# Patient Record
Sex: FEMALE | Race: WHITE | NOT HISPANIC OR LATINO | Employment: FULL TIME | URBAN - METROPOLITAN AREA
[De-identification: names, ages, dates, MRNs, and addresses within clinical notes are randomized per-mention and may not be internally consistent; named-entity substitution may affect disease eponyms.]

---

## 2017-02-24 ENCOUNTER — OFFICE VISIT (OUTPATIENT)
Dept: FAMILY MEDICINE | Facility: CLINIC | Age: 42
End: 2017-02-24
Payer: COMMERCIAL

## 2017-02-24 ENCOUNTER — RADIANT APPOINTMENT (OUTPATIENT)
Dept: GENERAL RADIOLOGY | Facility: CLINIC | Age: 42
End: 2017-02-24
Attending: FAMILY MEDICINE
Payer: COMMERCIAL

## 2017-02-24 VITALS
WEIGHT: 174.5 LBS | HEIGHT: 68 IN | HEART RATE: 70 BPM | DIASTOLIC BLOOD PRESSURE: 82 MMHG | BODY MASS INDEX: 26.45 KG/M2 | TEMPERATURE: 97.5 F | SYSTOLIC BLOOD PRESSURE: 116 MMHG | OXYGEN SATURATION: 97 %

## 2017-02-24 DIAGNOSIS — S69.92XA INJURY OF LEFT THUMB, INITIAL ENCOUNTER: ICD-10-CM

## 2017-02-24 DIAGNOSIS — S63.602A LEFT THUMB SPRAIN, INITIAL ENCOUNTER: Primary | ICD-10-CM

## 2017-02-24 PROCEDURE — 99214 OFFICE O/P EST MOD 30 MIN: CPT | Performed by: FAMILY MEDICINE

## 2017-02-24 PROCEDURE — 73140 X-RAY EXAM OF FINGER(S): CPT | Mod: LT

## 2017-02-24 ASSESSMENT — ENCOUNTER SYMPTOMS
CARDIOVASCULAR NEGATIVE: 1
RESPIRATORY NEGATIVE: 1
CONSTITUTIONAL NEGATIVE: 1

## 2017-02-24 NOTE — PROGRESS NOTES
"Musculoskeletal Problem       Musculoskeletal problem/pain      Duration: 2017 went skiing ; fell onto closed left hand, jamming thumb into snow    Had immediate swelling and pain of her entire left thumb, bruising has since tracked down to base of thumb    Hasn't been able to flex left thumb since then, so has difficult getting dressed    Right handed, so able to function at work    Description  Location: left thumb lower part    Intensity:  mild    Accompanying signs and symptoms: numbness, tingling and swelling    History  Previous similar problem: no   Previous evaluation:  None, pt hoping to get a X-ray on the thumb    Precipitating or alleviating factors:  Trauma or overuse: YES  Aggravating factors include: using the thumb or everyday use.    Therapies tried and outcome: stretching      No Known Allergies   Past Surgical History   Procedure Laterality Date     Hc tooth extraction w/forcep        section       x2        Review of Systems   Constitutional: Negative.    Respiratory: Negative.    Cardiovascular: Negative.    ROS:    Gen: Feeling well.   Endocrine: no heat or cold intolerance, no hair or skin changes  ID: no fevers, chills  Skin: no rashes  Psych: Mood is good  Chest: No dyspnea or chest pain on exertion.    Abd: No abdominal pain  GI: no change in bowel habits, black or bloody stools.    : No urinary tract symptoms. Specifically denies dysuria, hematuria, urgency, frequency, hesitancy, incomplete voiding.  Pelvic: denies pain, lymphadenopathy , unusual discharge  MS: no LEweakness, gait problems      /82 (BP Location: Left arm, Patient Position: Chair, Cuff Size: Adult Large)  Pulse 70  Temp 97.5  F (36.4  C) (Oral)  Ht 5' 8.2\" (1.732 m)  Wt 174 lb 8 oz (79.2 kg)  SpO2 97%  BMI 26.38 kg/m2     Physical Exam   Constitutional: She is well-developed, well-nourished, and in no distress.   Pulmonary/Chest: No respiratory distress.   Musculoskeletal:        Left hand: She exhibits " decreased range of motion, tenderness and swelling. She exhibits no bony tenderness, normal two-point discrimination, normal capillary refill, no deformity and no laceration. Normal sensation noted.        Hands:  Left thumb swollen, mild diffuse tenderness without localized severe point tenderness. Unable to flex thumb completely. Normal extension.   Neurological: She is alert.   Skin: Skin is warm and dry.   Nursing note and vitals reviewed.    I personally reviewed xray, no acute fracture noted.  ASSESSMENT / PLAN:  (S69.92XA) Injury of left thumb, initial encounter  (primary encounter diagnosis)  Comment: thumb sprain, left wrist splint fit today, Please see patient instructions below.     Plan: XR Finger Left G/E 2 Views, COTY PT, HAND, AND         CHIROPRACTIC REFERRAL         Alternate ice and heat, splint until swelling lessens  Then schedule physical therapy   The patient indicates understanding of these issues and agrees with the plan.

## 2017-02-24 NOTE — NURSING NOTE
"Chief Complaint   Patient presents with     Musculoskeletal Problem     left thumb injury       Initial /82 (BP Location: Left arm, Patient Position: Chair, Cuff Size: Adult Large)  Pulse 70  Temp 97.5  F (36.4  C) (Oral)  Ht 5' 8.2\" (1.732 m)  Wt 174 lb 8 oz (79.2 kg)  SpO2 97%  BMI 26.38 kg/m2 Estimated body mass index is 26.38 kg/(m^2) as calculated from the following:    Height as of this encounter: 5' 8.2\" (1.732 m).    Weight as of this encounter: 174 lb 8 oz (79.2 kg).  Medication Reconciliation: complete Odessa Fong MA      "

## 2017-02-24 NOTE — TELEPHONE ENCOUNTER
Did you want patient to get an OTC wrist brace?  Or send the prescription to a Mdical Supply Store?  I loaded [facility] and pended the DME as a refill.  Or you could have patient see MA to get a wrist brace from the clinic here.  Please advise  Iveth Batista RN

## 2017-02-24 NOTE — PROGRESS NOTES
SUBJECTIVE:                                                    Abigail Harvey is a 41 year old female who presents to clinic today for the following health issues:

## 2017-02-24 NOTE — TELEPHONE ENCOUNTER
Hi, I gave it to her in clinic, so it doesn't need to be filled.  Sincerely,  Dr. Rocio Treviño MD  2/24/2017

## 2017-02-24 NOTE — TELEPHONE ENCOUNTER
Reason for Call:  Other prescription    Detailed comments: Pharmacy states they are unable to fill or bill [employer] (WRIST/THUMB SPLINT/LEFT UNIV) MISC. They would suggest this be sent to a medical supply company or the patient picking it up directly from the clinic. Please follow up on other options. Thanks!    Call taken on 2/24/2017 at 2:46 PM by Carmen Hu

## 2017-02-24 NOTE — MR AVS SNAPSHOT
After Visit Summary   2/24/2017    Abigail Harvey    MRN: [id number]           Patient Information     Date Of Birth          1975        Visit Information        Provider Department      2/24/2017 11:00 AM Rocio Treviño MD [facility]        Today's Diagnoses     Injury of left thumb, initial encounter    -  1      Care Instructions    Phase 1: ice, compression (aircast or wrapping), elevation, and antiinflammatories (ibuprofen, naprosyn, ketoprofen, mobic).      Starting 48 hours after injury, contrast baths done three times per day if possible help reduce swelling.    Contrast Bath  1. Soak area in cold water bath (cold water in a bucket with some ice cubes) for 30 seconds.  2. Switch to bucket of as hot as water as can be tolerated without burning (around 104 degrees/40 degrees celcius, the same as a hot hot tub) for 30 sec.  Continue to switch back and forth every 30 seconds for 5 minutes, finishing in the cold water bath.    Phase 2:  When you can use handt without increasing pain or causing more swelling (generally in 2-4 weeks), continue to wear brace, but start strengthening and stretching exercises.  You may start Physical Therapy at this phase.    [facility]  Physical therapy to get you back in the game of life   [facility], a service of [facility], provides orthopedic and sports physical therapy at over 30 [facility] locations. In addtion to physical therapy, [facility] offers chiropractic and athletic training services.   Appointments [phone] - with or without a physician referral               Follow-ups after your visit        Who to contact     If you have questions or need follow up information about today's clinic visit or your schedule please contact [facility] directly at [phone].  Normal or non-critical lab and imaging results will be communicated to you by Carolee  "letter or phone within 4 business days after the clinic has received the results. If you do not hear from us within 7 days, please contact the clinic through Celeno or phone. If you have a critical or abnormal lab result, we will notify you by phone as soon as possible.  Submit refill requests through Celeno or call your pharmacy and they will forward the refill request to us. Please allow 3 business days for your refill to be completed.          Additional Information About Your Visit        [employer] Information     Celeno gives you secure access to your electronic health record. If you see a primary care provider, you can also send messages to your care team and make appointments. If you have questions, please call your primary care clinic.  If you do not have a primary care provider, please call [phone] and they will assist you.        Care EveryWhere ID     This is your Care EveryWhere ID. This could be used by other organizations to access your [facility] medical records  [id number]        Your Vitals Were     Pulse Temperature Height Pulse Oximetry BMI (Body Mass Index)       70 97.5  F (36.4  C) (Oral) 5' 8.2\" (1.732 m) 97% 26.38 kg/m2        Blood Pressure from Last 3 Encounters:   02/24/17 116/82   12/21/16 116/82   06/06/16 124/85    Weight from Last 3 Encounters:   02/24/17 174 lb 8 oz (79.2 kg)   12/21/16 171 lb (77.6 kg)   06/06/16 172 lb (78 kg)               Primary Care Provider Office Phone # Fax #    Deqa Isaura Packer [phone]       [facility] [address]  [facility] [address]        Thank you!     Thank you for choosing [facility]  for your care. Our goal is always to provide you with excellent care. Hearing back from our patients is one way we can continue to improve our services. Please take a few minutes to complete the written survey that you may receive in the mail after your visit with us. Thank you!             Your Updated " Medication List - Protect others around you: Learn how to safely use, store and throw away your medicines at www.disposemymeds.org.          This list is accurate as of: 2/24/17 12:00 PM.  Always use your most recent med list.                   Brand Name Dispense Instructions for use    CLARITIN 10 MG tablet   Generic drug:  loratadine     30 tablet    Take 1 tablet (10 mg) by mouth daily       levonorgestrel-ethinyl estradiol 0.15-0.03 MG per tablet    JOLESSA    91 tablet    Take 1 tablet by mouth daily       olopatadine 0.1 % ophthalmic solution    PATANOL    5 mL    Place 1 drop into both eyes 2 times daily

## 2017-02-24 NOTE — PATIENT INSTRUCTIONS
Phase 1: ice, compression (aircast or wrapping), elevation, and antiinflammatories (ibuprofen, naprosyn, ketoprofen, mobic).      Starting 48 hours after injury, contrast baths done three times per day if possible help reduce swelling.    Contrast Bath  1. Soak area in cold water bath (cold water in a bucket with some ice cubes) for 30 seconds.  2. Switch to bucket of as hot as water as can be tolerated without burning (around 104 degrees/40 degrees celcius, the same as a hot hot tub) for 30 sec.  Continue to switch back and forth every 30 seconds for 5 minutes, finishing in the cold water bath.    Phase 2:  When you can use handt without increasing pain or causing more swelling (generally in 2-4 weeks), continue to wear brace, but start strengthening and stretching exercises.  You may start Physical Therapy at this phase.    [facility]  Physical therapy to get you back in the game of life   The [facility], a service of [facility], provides orthopedic and sports physical therapy at over 30 [facility] locations. In addtion to physical therapy, [facility] offers chiropractic and athletic training services.   Appointments [phone] - with or without a physician referral

## 2017-03-22 ENCOUNTER — RADIANT APPOINTMENT (OUTPATIENT)
Dept: GENERAL RADIOLOGY | Facility: CLINIC | Age: 42
End: 2017-03-22
Attending: FAMILY MEDICINE
Payer: COMMERCIAL

## 2017-03-22 ENCOUNTER — OFFICE VISIT (OUTPATIENT)
Dept: FAMILY MEDICINE | Facility: CLINIC | Age: 42
End: 2017-03-22
Payer: COMMERCIAL

## 2017-03-22 VITALS
WEIGHT: 171 LBS | TEMPERATURE: 98.8 F | RESPIRATION RATE: 16 BRPM | HEART RATE: 78 BPM | OXYGEN SATURATION: 97 % | SYSTOLIC BLOOD PRESSURE: 126 MMHG | DIASTOLIC BLOOD PRESSURE: 74 MMHG | BODY MASS INDEX: 25.85 KG/M2

## 2017-03-22 DIAGNOSIS — M79.645 PAIN OF LEFT THUMB: Primary | ICD-10-CM

## 2017-03-22 DIAGNOSIS — M79.645 PAIN OF LEFT THUMB: ICD-10-CM

## 2017-03-22 PROCEDURE — 73130 X-RAY EXAM OF HAND: CPT | Mod: LT

## 2017-03-22 PROCEDURE — 99213 OFFICE O/P EST LOW 20 MIN: CPT | Performed by: FAMILY MEDICINE

## 2017-03-22 NOTE — MR AVS SNAPSHOT
After Visit Summary   3/22/2017    Abigail Harvey    MRN: [id number]           Patient Information     Date Of Birth          1975        Visit Information        Provider Department      3/22/2017 1:20 PM Mis Packer MD [facility]        Today's Diagnoses     Pain of left thumb    -  1       Follow-ups after your visit        Your next 10 appointments already scheduled     Mar 23, 2017  5:00 PM CDT   COTY Hand with Krys Torrez [facility] ([facility])    [facility][note 3][address]  [address] [address] [phone]              Who to contact     If you have questions or need follow up information about today's clinic visit or your schedule please contact [facility] directly at [phone].  Normal or non-critical lab and imaging results will be communicated to you by MyChart, letter or phone within 4 business days after the clinic has received the results. If you do not hear from us within 7 days, please contact the clinic through MyChart or phone. If you have a critical or abnormal lab result, we will notify you by phone as soon as possible.  Submit refill requests through Kitchon or call your pharmacy and they will forward the refill request to us. Please allow 3 business days for your refill to be completed.          Additional Information About Your Visit        MyChart Information     Kitchon gives you secure access to your electronic health record. If you see a primary care provider, you can also send messages to your care team and make appointments. If you have questions, please call your primary care clinic.  If you do not have a primary care provider, please call [phone] and they will assist you.        Care EveryWhere ID     This is your Care EveryWhere ID. This could be used by other organizations to access your [place] medical records  [id number]        Your Vitals Were      Pulse Temperature Respirations Pulse Oximetry BMI (Body Mass Index)       78 98.8  F (37.1  C) (Tympanic) 16 97% 25.85 kg/m2        Blood Pressure from Last 3 Encounters:   03/22/17 126/74   02/24/17 116/82   12/21/16 116/82    Weight from Last 3 Encounters:   03/22/17 171 lb (77.6 kg)   02/24/17 174 lb 8 oz (79.2 kg)   12/21/16 171 lb (77.6 kg)              Today, you had the following     No orders found for display       Primary Care Provider Office Phone # Fax #    Deqa Isaura Packer [phone]       [place] [address] [facility] [address]        Thank you!     Thank you for choosing [facility]  for your care. Our goal is always to provide you with excellent care. Hearing back from our patients is one way we can continue to improve our services. Please take a few minutes to complete the written survey that you may receive in the mail after your visit with us. Thank you!             Your Updated Medication List - Protect others around you: Learn how to safely use, store and throw away your medicines at www.disposemymeds.org.          This list is accurate as of: 3/22/17  3:36 PM.  Always use your most recent med list.                   Brand Name Dispense Instructions for use    CLARITIN 10 MG tablet   Generic drug:  loratadine     30 tablet    Take 1 tablet (10 mg) by mouth daily       levonorgestrel-ethinyl estradiol 0.15-0.03 MG per tablet    JOLESSA    91 tablet    Take 1 tablet by mouth daily       olopatadine 0.1 % ophthalmic solution    PATANOL    5 mL    Place 1 drop into both eyes 2 times daily       Wrist/Thumb Splint/Left Univ Misc     1 each    1 Device continuous

## 2017-03-22 NOTE — PROGRESS NOTES
SUBJECTIVE:                                                    Abigail Harvey is a 41 year old female who presents to clinic today for the following health issues:      Pt wants repeat xray. She had previous thumb injury after falling on snow. She had xray done last month which did not show fracture. She is currently wearing thumb splint and will start hand therapy. She is concerned because she still has edema and numbness/tinglng in the morning which improves after wearing the splint. ROM is also limited.     Problem list and histories reviewed & adjusted, as indicated.  Additional history: as documented      Reviewed and updated as needed this visit by clinical staff       Reviewed and updated as needed this visit by Provider         ROS:  Constitutional, HEENT, cardiovascular, pulmonary, gi and gu systems are negative, except as otherwise noted.    OBJECTIVE:                                                    /74 (BP Location: Left arm, Patient Position: Chair, Cuff Size: Adult Regular)  Pulse 78  Temp 98.8  F (37.1  C) (Tympanic)  Resp 16  Wt 171 lb (77.6 kg)  SpO2 97%  BMI 25.85 kg/m2  Body mass index is 25.85 kg/(m^2).  GENERAL: healthy, alert and no distress  EYES: Eyes grossly normal to inspection  HENT: nose and mouth without ulcers or lesions  MS: + left thumb with edema, + tenderness, limited ROM     Diagnostic Test Results:  none      ASSESSMENT/PLAN:                                                      ## Pain of left thumb  - repeat xray done in clinic, per my view showed no acute fracture   - recommended to use ice every two to three times daily to help with edema  - start hand therapy, continue to work on ROM and use splint as needed   - Follow if symptoms worsen or fail to improve.    Mis Packer MD  [facility]

## 2017-03-22 NOTE — NURSING NOTE
"Chief Complaint   Patient presents with     Injury     possible thumb facture       Initial /74 (BP Location: Left arm, Patient Position: Chair, Cuff Size: Adult Regular)  Pulse 78  Temp 98.8  F (37.1  C) (Tympanic)  Resp 16  Wt 171 lb (77.6 kg)  SpO2 97%  BMI 25.85 kg/m2 Estimated body mass index is 25.85 kg/(m^2) as calculated from the following:    Height as of 2/24/17: 5' 8.2\" (1.732 m).    Weight as of this encounter: 171 lb (77.6 kg).  Medication Reconciliation: complete       Abdirizak Fong MA      "

## 2017-03-23 ENCOUNTER — THERAPY VISIT (OUTPATIENT)
Dept: OCCUPATIONAL THERAPY | Facility: CLINIC | Age: 42
End: 2017-03-23
Payer: COMMERCIAL

## 2017-03-23 DIAGNOSIS — M79.645 THUMB PAIN, LEFT: Primary | ICD-10-CM

## 2017-03-23 PROCEDURE — 29130 APPL FINGER SPLINT STATIC: CPT | Mod: GO | Performed by: OCCUPATIONAL THERAPIST

## 2017-03-23 PROCEDURE — 97165 OT EVAL LOW COMPLEX 30 MIN: CPT | Mod: GO | Performed by: OCCUPATIONAL THERAPIST

## 2017-03-23 PROCEDURE — 97110 THERAPEUTIC EXERCISES: CPT | Mod: GO | Performed by: OCCUPATIONAL THERAPIST

## 2017-03-23 NOTE — MR AVS SNAPSHOT
After Visit Summary   3/23/2017    Abigail Harvey    MRN: [id number]           Patient Information     Date Of Birth          1975        Visit Information        Provider Department      3/23/2017 5:00 PM Krys Torrez OT [facility] [place]        Today's Diagnoses     Thumb pain, left    -  1       Follow-ups after your visit        Your next 10 appointments already scheduled     Mar 30, 2017  5:30 PM CDT   COTY Hand with Krys Torrez OT   [facility] [place] ([facility])    [address] [place] [address] [phone]            Apr 20, 2017  4:30 PM CDT   COTY Hand with Krys Torrez OT   [facility] [place] ([facility])    [address] [place] [address] [phone]              Who to contact     If you have questions or need follow up information about today's clinic visit or your schedule please contact [facility] directly at [phone].  Normal or non-critical lab and imaging results will be communicated to you by BioSTLhart, letter or phone within 4 business days after the clinic has received the results. If you do not hear from us within 7 days, please contact the clinic through BioSTLhart or phone. If you have a critical or abnormal lab result, we will notify you by phone as soon as possible.  Submit refill requests through [employer] or call your pharmacy and they will forward the refill request to us. Please allow 3 business days for your refill to be completed.          Additional Information About Your Visit        BioSTL[employer] Information     [employer] gives you secure access to your electronic health record. If you see a primary care provider, you can also send messages to your care team and make appointments. If you have questions, please call your primary care clinic.  If you do not have a primary care provider, please  call [phone] and they will assist you.        Care EveryWhere ID     This is your Care EveryWhere ID. This could be used by other organizations to access your [place] medical records  [id number]         Blood Pressure from Last 3 Encounters:   03/22/17 126/74   02/24/17 116/82   12/21/16 116/82    Weight from Last 3 Encounters:   03/22/17 77.6 kg (171 lb)   02/24/17 79.2 kg (174 lb 8 oz)   12/21/16 77.6 kg (171 lb)              We Performed the Following     APPLY FINGER SPLINT STATIC     HC OT EVAL, LOW COMPLEXITY     COTY INITIAL EVAL REPORT     THERAPEUTIC EXERCISES        Primary Care Provider Office Phone # Fax #    Deqa Isaura Packer [phone]       [facility] [address]  [facility] [address]        Thank you!     Thank you for choosing [facility]  for your care. Our goal is always to provide you with excellent care. Hearing back from our patients is one way we can continue to improve our services. Please take a few minutes to complete the written survey that you may receive in the mail after your visit with us. Thank you!             Your Updated Medication List - Protect others around you: Learn how to safely use, store and throw away your medicines at www.disposemymeds.org.          This list is accurate as of: 3/23/17  6:30 PM.  Always use your most recent med list.                   Brand Name Dispense Instructions for use    CLARITIN 10 MG tablet   Generic drug:  loratadine     30 tablet    Take 1 tablet (10 mg) by mouth daily       levonorgestrel-ethinyl estradiol 0.15-0.03 MG per tablet    JOLESSA    91 tablet    Take 1 tablet by mouth daily       olopatadine 0.1 % ophthalmic solution    PATANOL    5 mL    Place 1 drop into both eyes 2 times daily       Wrist/Thumb Splint/Left Univ Misc     1 each    1 Device continuous

## 2017-03-23 NOTE — PROGRESS NOTES
"Hand Therapy Initial Evaluation    Current Date:  3/23/2017    Diagnosis:  Left  thumb pain  DOI:  2/15/17    Referring MD: Rocio Treviño MD     Next MD visit: PRN      Initial Subjective:  Abigail Harvey is a 41 year old right hand dominant female.    Patient reports symptoms of pain, stiffness/loss of motion, weakness/loss of strength, numbness and tingling  of theleft  thumb  which occurred due to skiing for first time in life, and falling every time going down, 3x falling on the thumb. Seemed to jam falling axially on the thumb. Since onset symptoms are Unchanged  Special tests:  x-ray and results neg.  Previous treatment: only OTC thumb spica.    General health as reported by patient is good  excellent.  Pertinent medical history includes:asthma  Medical allergies:none.  Surgical history: other: C section .  Medication history: none.    Current occupation is an educator  Currently working in normal job without restrictions  Job Tasks: prolonged sitting, repetitive tasks, computer work  Barriers include:none  Prior functional level:  no limitations    Additional Occupational Profile Information (patterns of daily living, interests, values and needs): . Pt reports difficulty with bathing/showering, dressing, home establishment and management, meal preparation and cleanup, work and notes she is not using the left thumb keeping it out of the task. Can't button pants, has been wearing stretch pants.    Living situation: lives with their spouse and and 2 kids, 13 and 7, and a 22 year old is not at [employer]  Additional work context information: full time  Mobility: No difficulty getting around home and community  Transportation: Able to drive own car for transportation  Caregiver for: No caregiver concerns identified  Values / Spirituality: none noted  Daily routine: work and caring for family and self  Leisure activities / hobbies: none noted, \"just can't use my hand now\"      Functional Outcome " Measure:  3/23/2017  Functional Outcome Measure:  Upper Extremity Functional Index Score:      (A lower score indicates greater disability.)        Objective:  Observation: Color/Temperature:     [x]    Normal  []    Abnormal    PAIN 3/23/2017     Location Left  thumb     Description  Dull, Numb, Tingling and stiff     Radiates no     Worse d/n daytime     Frequency activity dependant     Exacerbated by Use as above     Relieves rest     At rest 0-10/10 0/10     On use 0-10/10 3/10     At worst.0-10/10 3/10     Sleep disruption?   no     Progression NA         EDEMA:   Circumference: (Measured in cm)  3/23/2017    Location: Thumb    Right Left   P1 6.5 6.5   PIP 6.6 6.5   P2     DIP         ROM:  Thumb 3/23/2017 3/23/2017   AROM(PROM) Right Left   MP /58 /45   IP /55 (86) /70   RAbd 62 58   PAbd 60 53   Retropulsion     Kapandji Opposition Scale (0-10/10) 10 9.5       Provocative test: Thumb           3/23/2017 Right Left   Thumb UCL test Normal end range Soft end range with pain   IP/MP Palpation of joints normal normal   Phalen's Test normal normal       Strength:  [x]   Contraindicated  Edema:  mild of the  thumb  Palpation:  []     Normal        [x]   Tender       [x]  Mild         []   Moderate []   Severe   Location: MCP thumb UCL area, and distal thumb is painful with end range ROM    Sensation:  WNL throughout all nerve distributions; per patient report and however she notes the thumb is numb dorsally without wearing the OTC thumb spica      Assessment:   Patient presents with symptoms consistent with above diagnosis with what appears to be UCL MCP thumb injury,  with conservative intervention.     Patient's limitations or Problem List includes:  Pain, Decreased ROM/motion, Increased edema, Weakness and Sensory disturbance of the left thumb which interferes with the patient's ability to perform Self Care Tasks (dressing, eating, bathing, hygiene/toileting), Work Tasks, Sleep Patterns, Recreational Activities  and Household Chores as compared to previous level of function.    Rehab Potential:  Excellent - Return to full activity, no limitations    Patient will benefit from skilled Occupational Therapy to increase ROM, flexibility,  strength, pinch strength, stability of thumb, coordination and dexterity, and sensation and decrease pain and edema to return to previous activity level and resume normal daily tasks and to reach their rehab potential.    Barriers to Learning:  No barrier    Communication Issues:  Patient appears to be able to clearly communicate and understand verbal and written communication and follow directions correctly.  Clinical Decision Making (Complexity): Low complexity  Chart Review, Brief history including review of medical and/or therapy records relating to the presenting problem and Simple history review with patient    Assessment of Occupational Performance:  5 or more Performance Deficits  Identified Performance Deficits: bathing/showering, toileting, dressing, feeding, hygiene and grooming, home establishment and management, meal preparation and cleanup, work and leisure activities      Treatment Explanation:  The following has been discussed with the patient:  RX ordered/plan of care  Anticipated outcomes  Possible risks and side effects    Treatment Plan:   Frequency:  2 X a month, once daily  Duration:  for 3 months     Modalities:  US and Paraffin  Therapeutic Exercise:  AROM and Blocking  Self Care:  Ergonomic Considerations  Orthotic Fabrication: Static hand based thumb spica orthosis, IP included      Discharge Plan:  Achieve all LTG.  Independent in home treatment program.  Reach maximal therapeutic benefit.    Home Exercise Program:  AROM thumb in plane of the palm      Next Visit:  Refit orthosis if edema changes, see in 4 weeks to progress ROM and exercises

## 2017-04-20 ENCOUNTER — THERAPY VISIT (OUTPATIENT)
Dept: OCCUPATIONAL THERAPY | Facility: CLINIC | Age: 42
End: 2017-04-20
Payer: COMMERCIAL

## 2017-04-20 DIAGNOSIS — M79.645 THUMB PAIN, LEFT: ICD-10-CM

## 2017-04-20 PROCEDURE — 97140 MANUAL THERAPY 1/> REGIONS: CPT | Mod: GO | Performed by: OCCUPATIONAL THERAPIST

## 2017-04-20 PROCEDURE — 97530 THERAPEUTIC ACTIVITIES: CPT | Mod: GO | Performed by: OCCUPATIONAL THERAPIST

## 2017-04-20 NOTE — PROGRESS NOTES
Hand Therapy SOAP Note    Current Date: 4/20/2017      Diagnosis:  Left  thumb pain  DOI:  2/15/17    Referring MD: Rocio Treviño MD     Initial Subjective:  Abigail Harvey is a 41 year old right hand dominant female.    S:   Subjective: it feels better, not fully normal. Been doing the exercises  Functional changes noted by patient: not using out of the orthosis yet, only for light tasks    Response to previous treatment: good  Patient has noted adverse reaction to:   None          Functional Outcome Measure:  3/23/2017  Functional Outcome Measure:  Upper Extremity Functional Index Score:    See flowsheet      Objective:  Observation: Color/Temperature:     [x]    Normal  []    Abnormal    PAIN 3/23/2017 4/20    Location Left  thumb     Description  Dull, Numb, Tingling and stiff     Radiates no     Worse d/n daytime     Frequency activity dependant     Exacerbated by Use as above     Relieves rest     At rest 0-10/10 0/10     On use 0-10/10 3/10     At worst.0-10/10 3/10 1-2/10    Sleep disruption?   no     Progression NA         EDEMA:   Circumference: (Measured in cm)  Thumb 3/23/2017  4/20         Location: Right Left left   P1 6.5 6.5 6.8   IP 6.6 6.5 6.2                   ROM:  Thumb 3/23/2017 3/23/2017 4/20   AROM(PROM) Right Left left   MP /58 /45 10/40   IP /55 (86) /70 /60   RAbd 62 58    PAbd 60 53 60   Retropulsion      Kapandji Opposition Scale (0-10/10) 10 9.5 10       Provocative test: Thumb           3/23/2017 Right Left   Thumb UCL test Normal end range Soft end range with pain   IP/MP Palpation of joints normal normal   Phalen's Test normal normal       Strength:  [x]   Contraindicated  Edema:  mild of the  thumb  Palpation:  []     Normal        [x]   Tender       [x]  Mild         []   Moderate []   Severe   Location: MCP thumb UCL area, and distal thumb is painful with end range ROM    Sensation:  WNL throughout all nerve distributions; per patient report and however she notes the thumb is  numb dorsally without wearing the OTC thumb spica    A:  See flowsheet      Treatment Plan:   Frequency:  2 X a month, once daily  Duration:  for 3 months     Modalities:  US and Paraffin  Therapeutic Exercise:  AROM and Blocking  Self Care:  Ergonomic Considerations  Orthotic Fabrication: Static hand based thumb spica orthosis, IP included      Discharge Plan:  Achieve all LTG.  Independent in home treatment program.  Reach maximal therapeutic benefit.    Home Exercise Program:  AROM thumb in plane of the palm  4/20/2017  AROM thumb all planes and wean out of splint over next 2 weeks, wear as needed for support      Next Visit:   progress ROM and exercises

## 2017-04-20 NOTE — MR AVS SNAPSHOT
After Visit Summary   4/20/2017    Abigail Harvey    MRN: [id number]           Patient Information     Date Of Birth          1975        Visit Information        Provider Department      4/20/2017 4:30 PM Krys Torrez OT [facility]        Today's Diagnoses     Thumb pain, left           Follow-ups after your visit        Who to contact     If you have questions or need follow up information about today's clinic visit or your schedule please contact [facility] directly at [phone].  Normal or non-critical lab and imaging results will be communicated to you by [employer], letter or phone within 4 business days after the clinic has received the results. If you do not hear from us within 7 days, please contact the clinic through [employer] or phone. If you have a critical or abnormal lab result, we will notify you by phone as soon as possible.  Submit refill requests through [employer] or call your pharmacy and they will forward the refill request to us. Please allow 3 business days for your refill to be completed.          Additional Information About Your Visit        MyChart Information     [employer] gives you secure access to your electronic health record. If you see a primary care provider, you can also send messages to your care team and make appointments. If you have questions, please call your primary care clinic.  If you do not have a primary care provider, please call [phone] and they will assist you.        Care EveryWhere ID     This is your Care EveryWhere ID. This could be used by other organizations to access your [place] medical records  [id number]         Blood Pressure from Last 3 Encounters:   03/22/17 126/74   02/24/17 116/82   12/21/16 116/82    Weight from Last 3 Encounters:   03/22/17 77.6 kg (171 lb)   02/24/17 79.2 kg (174 lb 8 oz)   12/21/16 77.6 kg (171 lb)              We Performed the Following     MANUAL THER  TECH,1+[facility], 15 MIN     THERAPEUTIC ACTIVITIES        Primary Care Provider Office Phone # Fax #    Deqa Isaura Packer [phone]       [place] [address] [facility] [address]        Thank you!     Thank you for choosing [facility]  for your care. Our goal is always to provide you with excellent care. Hearing back from our patients is one way we can continue to improve our services. Please take a few minutes to complete the written survey that you may receive in the mail after your visit with us. Thank you!             Your Updated Medication List - Protect others around you: Learn how to safely use, store and throw away your medicines at www.disposemymeds.org.          This list is accurate as of: 4/20/17  5:14 PM.  Always use your most recent med list.                   Brand Name Dispense Instructions for use    CLARITIN 10 MG tablet   Generic drug:  loratadine     30 tablet    Take 1 tablet (10 mg) by mouth daily       levonorgestrel-ethinyl estradiol 0.15-0.03 MG per tablet    JOLESSA    91 tablet    Take 1 tablet by mouth daily       olopatadine 0.1 % ophthalmic solution    PATANOL    5 mL    Place 1 drop into both eyes 2 times daily       Wrist/Thumb Splint/Left Univ Misc     1 each    1 Device continuous

## 2017-04-29 DIAGNOSIS — N92.0 EXCESSIVE OR FREQUENT MENSTRUATION: Primary | ICD-10-CM

## 2017-04-29 NOTE — TELEPHONE ENCOUNTER
levonorgestrel-ethinyl estradiol (JOLESSA) 0.15-0.03 MG      Last Written Prescription Date: 4/22/16  Last Fill Quantity: 91, # refills: 3  Last Office Visit with G, P or [facility] prescribing provider: 3/22/17       BP Readings from Last 3 Encounters:   03/22/17 126/74   02/24/17 116/82   12/21/16 116/82     Date of last Breast Exam: none

## 2017-05-02 RX ORDER — LEVONORGESTREL AND ETHINYL ESTRADIOL 0.15-0.03
1 KIT ORAL DAILY
Qty: 28 TABLET | Refills: 0 | Status: SHIPPED | OUTPATIENT
Start: 2017-05-02 | End: 2017-05-04

## 2017-05-02 NOTE — TELEPHONE ENCOUNTER
Patient called back the clinic, she is now seeing Sofia, she will have pharmacy send refill request to her new PCP.

## 2017-05-02 NOTE — TELEPHONE ENCOUNTER
Patient due for OV and establish care with provider.  Tried to do a 28 day to allow time for appointment unable due to medication comes in 91 tablets.  Advise patient come for OV.  Message left for patient to return call to notify of need for OV.  Roseline Krause RN  [employer]riage [employer]

## 2017-05-04 DIAGNOSIS — N92.0 EXCESSIVE OR FREQUENT MENSTRUATION: ICD-10-CM

## 2017-05-05 RX ORDER — LEVONORGESTREL AND ETHINYL ESTRADIOL 0.15-0.03
1 KIT ORAL DAILY
Qty: 84 TABLET | Refills: 2 | Status: SHIPPED | OUTPATIENT
Start: 2017-05-05 | End: 2018-01-11

## 2017-05-05 NOTE — TELEPHONE ENCOUNTER
levonorgestrel-ethinyl estradiol (JOLESSA) 0.15-0.03 MG per tablet      Last Written Prescription Date: 5/2/2017  Last Fill Quantity: 28,  # refills: 0   Last Office Visit with [facility], [facility] or [facility] prescribing provider: 3/22/2017                                               Prescription approved per [facility] Refill Protocol.    Signed Prescriptions:                        Disp   Refills    levonorgestrel-ethinyl estradiol (JOLESSA)*84 tab*2        Sig: Take 1 tablet by mouth daily  Authorizing Provider: MARK BOOKER  Ordering User: PEREZ RODAS      Closing encounter - no further actions needed at this time    Perez Rodas RN

## 2017-08-29 ENCOUNTER — OFFICE VISIT (OUTPATIENT)
Dept: FAMILY MEDICINE | Facility: CLINIC | Age: 42
End: 2017-08-29
Payer: COMMERCIAL

## 2017-08-29 VITALS
RESPIRATION RATE: 12 BRPM | HEART RATE: 63 BPM | BODY MASS INDEX: 25.7 KG/M2 | DIASTOLIC BLOOD PRESSURE: 73 MMHG | SYSTOLIC BLOOD PRESSURE: 115 MMHG | WEIGHT: 170 LBS | OXYGEN SATURATION: 97 % | TEMPERATURE: 95.9 F

## 2017-08-29 DIAGNOSIS — R07.0 THROAT PAIN: Primary | ICD-10-CM

## 2017-08-29 LAB
DEPRECATED S PYO AG THROAT QL EIA: NORMAL
SPECIMEN SOURCE: NORMAL

## 2017-08-29 PROCEDURE — 87081 CULTURE SCREEN ONLY: CPT | Performed by: FAMILY MEDICINE

## 2017-08-29 PROCEDURE — 87880 STREP A ASSAY W/OPTIC: CPT | Performed by: FAMILY MEDICINE

## 2017-08-29 PROCEDURE — 99213 OFFICE O/P EST LOW 20 MIN: CPT | Performed by: FAMILY MEDICINE

## 2017-08-29 NOTE — NURSING NOTE
"Chief Complaint   Patient presents with     Pharyngitis       Initial /73 (BP Location: Left arm, Patient Position: Sitting, Cuff Size: Adult Regular)  Pulse 63  Temp 95.9  F (35.5  C) (Tympanic)  Resp 12  Wt 170 lb (77.1 kg)  SpO2 97%  BMI 25.7 kg/m2 Estimated body mass index is 25.7 kg/(m^2) as calculated from the following:    Height as of 2/24/17: 5' 8.2\" (1.732 m).    Weight as of this encounter: 170 lb (77.1 kg).  Medication Reconciliation: complete     sma Slick  "

## 2017-08-29 NOTE — MR AVS SNAPSHOT
After Visit Summary   8/29/2017    Abigail Harvey    MRN: [id number]           Patient Information     Date Of Birth          1975        Visit Information        Provider Department      8/29/2017 9:00 AM Mis Packer MD [facility]        Today's Diagnoses     Throat pain    -  1       Follow-ups after your visit        Who to contact     If you have questions or need follow up information about today's clinic visit or your schedule please contact [facility] directly at [phone].  Normal or non-critical lab and imaging results will be communicated to you by [employer], letter or phone within 4 business days after the clinic has received the results. If you do not hear from us within 7 days, please contact the clinic through [employer] or phone. If you have a critical or abnormal lab result, we will notify you by phone as soon as possible.  Submit refill requests through [employer] or call your pharmacy and they will forward the refill request to us. Please allow 3 business days for your refill to be completed.          Additional Information About Your Visit        MyChart Information     [employer] gives you secure access to your electronic health record. If you see a primary care provider, you can also send messages to your care team and make appointments. If you have questions, please call your primary care clinic.  If you do not have a primary care provider, please call [phone] and they will assist you.        Care EveryWhere ID     This is your Care EveryWhere ID. This could be used by other organizations to access your [place] medical records  [id number]        Your Vitals Were     Pulse Temperature Respirations Pulse Oximetry BMI (Body Mass Index)       63 95.9  F (35.5  C) (Tympanic) 12 97% 25.7 kg/m2        Blood Pressure from Last 3 Encounters:   08/29/17 115/73   03/22/17 126/74   02/24/17 116/82    Weight from Last 3 Encounters:   08/29/17 170  lb (77.1 kg)   03/22/17 171 lb (77.6 kg)   02/24/17 174 lb 8 oz (79.2 kg)              We Performed the Following     Beta strep group A culture     Strep, Rapid Screen        Primary Care Provider Office Phone # Fax #    Dejosa Isaura Packer [phone]       [address]  [facility] [address]        Equal Access to Services     ALICE COBB : Hadii aad ku hadasho Soomaali, waaxda luqadaha, qaybta kaalmada adeegyada, waxay idiin hayaan adeeg khlauraluís larangel . So [facility] [phone].    ATENCIÓN: Si diya paula, tiene a byrd disposición servicios gratuitos de asistencia lingüística. Llame al [phone].    We comply with applicable federal civil rights laws and [place] laws. We do not discriminate on the basis of race, color, national origin, age, disability sex, sexual orientation or gender identity.            Thank you!     Thank you for choosing [facility]  for your care. Our goal is always to provide you with excellent care. Hearing back from our patients is one way we can continue to improve our services. Please take a few minutes to complete the written survey that you may receive in the mail after your visit with us. Thank you!             Your Updated Medication List - Protect others around you: Learn how to safely use, store and throw away your medicines at www.disposemymeds.org.          This list is accurate as of: 8/29/17 10:50 AM.  Always use your most recent med list.                   Brand Name Dispense Instructions for use Diagnosis    CLARITIN 10 MG tablet   Generic drug:  loratadine     30 tablet    Take 1 tablet (10 mg) by mouth daily        levonorgestrel-ethinyl estradiol 0.15-0.03 MG per tablet    JOLESSA    84 tablet    Take 1 tablet by mouth daily    Excessive or frequent menstruation       olopatadine 0.1 % ophthalmic solution    PATANOL    5 mL    Place 1 drop into both eyes 2 times daily    Allergic conjunctivitis       Wrist/Thumb Splint/Left Univ  Misc     1 each    1 Device continuous    Injury of left thumb, initial encounter

## 2017-08-29 NOTE — PROGRESS NOTES
SUBJECTIVE:   Abigail Harvey is a 41 year old female who presents to clinic today for the following health issues:      PHARYNGITIS      Duration: two weeks    Description  nasal congestion, moderate sore throat, cough (improved) and wheezing, chest tightness,     Accompanying signs and symptoms: see above     History (predisposing factors):  asthma    Precipitating or alleviating factors: sleeping, hurts to swallow and yawn    Therapies tried and outcome:  Sudafed, albuterol inhaler a few times     Sore throat is still persistent. Pt wakes up from sleep due to the sore throat. Other symptoms have improved.   No fever, chills, PND, ear pain, facial pressure or headaches.   No sick contacts.   No recent travel.     Problem list and histories reviewed & adjusted, as indicated.  Additional history: as documented    Labs reviewed in EPIC    Reviewed and updated as needed this visit by clinical staff     Reviewed and updated as needed this visit by Provider         ROS:  Constitutional, HEENT, cardiovascular, pulmonary, gi and gu systems are negative, except as otherwise noted.      OBJECTIVE:   /73 (BP Location: Left arm, Patient Position: Sitting, Cuff Size: Adult Regular)  Pulse 63  Temp 95.9  F (35.5  C) (Tympanic)  Resp 12  Wt 170 lb (77.1 kg)  SpO2 97%  BMI 25.7 kg/m2  Body mass index is 25.7 kg/(m^2).  GENERAL: healthy, alert and no distress  HENT:  nose and mouth without ulcers or lesions  NECK:+ bilateral cervical adenopathy  RESP: lungs clear to auscultation - no rales, rhonchi or wheezes  CV: regular rate and rhythm, normal S1 S2    Diagnostic Test Results:  Results for orders placed or performed in visit on 08/29/17 (from the past 24 hour(s))   Strep, Rapid Screen   Result Value Ref Range    Specimen Description Throat     Rapid Strep A Screen       NEGATIVE: No Group A streptococcal antigen detected by immunoassay, await culture report.       ASSESSMENT/PLAN:       1. Throat pain  - Strep,  Rapid Screen negative   - Beta strep group A culture pending, tx if culture is positive  - discussed likely viral infection, recommended tea with honey and lemon, fluids and ibuprofen or tylenol PRN  - Follow if symptoms worsen or fail to improve.        Mis Packer MD  [facility]

## 2017-08-30 LAB
BACTERIA SPEC CULT: NORMAL
SPECIMEN SOURCE: NORMAL

## 2017-10-08 PROBLEM — M79.645 THUMB PAIN, LEFT: Status: RESOLVED | Noted: 2017-03-23 | Resolved: 2017-10-08

## 2017-10-08 PROBLEM — S69.92XA INJURY OF LEFT THUMB, INITIAL ENCOUNTER: Status: RESOLVED | Noted: 2017-02-24 | Resolved: 2017-10-08

## 2017-10-09 ENCOUNTER — OFFICE VISIT (OUTPATIENT)
Dept: FAMILY MEDICINE | Facility: CLINIC | Age: 42
End: 2017-10-09
Payer: COMMERCIAL

## 2017-10-09 VITALS
DIASTOLIC BLOOD PRESSURE: 83 MMHG | TEMPERATURE: 98.5 F | RESPIRATION RATE: 19 BRPM | HEART RATE: 79 BPM | BODY MASS INDEX: 25.24 KG/M2 | OXYGEN SATURATION: 98 % | SYSTOLIC BLOOD PRESSURE: 128 MMHG | WEIGHT: 167 LBS

## 2017-10-09 DIAGNOSIS — J45.31 MILD PERSISTENT ASTHMA WITH ACUTE EXACERBATION: ICD-10-CM

## 2017-10-09 DIAGNOSIS — R53.83 FATIGUE, UNSPECIFIED TYPE: ICD-10-CM

## 2017-10-09 DIAGNOSIS — J02.9 SORE THROAT: ICD-10-CM

## 2017-10-09 DIAGNOSIS — J03.90 TONSILLITIS: ICD-10-CM

## 2017-10-09 DIAGNOSIS — Z23 NEED FOR PROPHYLACTIC VACCINATION AND INOCULATION AGAINST INFLUENZA: ICD-10-CM

## 2017-10-09 DIAGNOSIS — R52 BODY ACHES: Primary | ICD-10-CM

## 2017-10-09 DIAGNOSIS — R10.11 ABDOMINAL PAIN, RIGHT UPPER QUADRANT: ICD-10-CM

## 2017-10-09 LAB
BASOPHILS # BLD AUTO: 0 10E9/L (ref 0–0.2)
BASOPHILS NFR BLD AUTO: 0.4 %
DIFFERENTIAL METHOD BLD: ABNORMAL
EOSINOPHIL # BLD AUTO: 0.1 10E9/L (ref 0–0.7)
EOSINOPHIL NFR BLD AUTO: 0.6 %
ERYTHROCYTE [DISTWIDTH] IN BLOOD BY AUTOMATED COUNT: 12.1 % (ref 10–15)
HCT VFR BLD AUTO: 39.7 % (ref 35–47)
HETEROPH AB SER QL: NEGATIVE
HGB BLD-MCNC: 13.6 G/DL (ref 11.7–15.7)
LYMPHOCYTES # BLD AUTO: 1.7 10E9/L (ref 0.8–5.3)
LYMPHOCYTES NFR BLD AUTO: 15.2 %
MCH RBC QN AUTO: 30.7 PG (ref 26.5–33)
MCHC RBC AUTO-ENTMCNC: 34.3 G/DL (ref 31.5–36.5)
MCV RBC AUTO: 90 FL (ref 78–100)
MONOCYTES # BLD AUTO: 0.6 10E9/L (ref 0–1.3)
MONOCYTES NFR BLD AUTO: 5.7 %
NEUTROPHILS # BLD AUTO: 8.5 10E9/L (ref 1.6–8.3)
NEUTROPHILS NFR BLD AUTO: 78.1 %
PLATELET # BLD AUTO: 346 10E9/L (ref 150–450)
RBC # BLD AUTO: 4.43 10E12/L (ref 3.8–5.2)
WBC # BLD AUTO: 10.9 10E9/L (ref 4–11)

## 2017-10-09 PROCEDURE — 86665 EPSTEIN-BARR CAPSID VCA: CPT | Performed by: FAMILY MEDICINE

## 2017-10-09 PROCEDURE — 82306 VITAMIN D 25 HYDROXY: CPT | Performed by: FAMILY MEDICINE

## 2017-10-09 PROCEDURE — 90471 IMMUNIZATION ADMIN: CPT | Performed by: FAMILY MEDICINE

## 2017-10-09 PROCEDURE — 99000 SPECIMEN HANDLING OFFICE-LAB: CPT | Performed by: FAMILY MEDICINE

## 2017-10-09 PROCEDURE — 86663 EPSTEIN-BARR ANTIBODY: CPT | Performed by: FAMILY MEDICINE

## 2017-10-09 PROCEDURE — 90686 IIV4 VACC NO PRSV 0.5 ML IM: CPT | Performed by: FAMILY MEDICINE

## 2017-10-09 PROCEDURE — 83540 ASSAY OF IRON: CPT | Performed by: FAMILY MEDICINE

## 2017-10-09 PROCEDURE — 83550 IRON BINDING TEST: CPT | Performed by: FAMILY MEDICINE

## 2017-10-09 PROCEDURE — 99214 OFFICE O/P EST MOD 30 MIN: CPT | Mod: 25 | Performed by: FAMILY MEDICINE

## 2017-10-09 PROCEDURE — 82728 ASSAY OF FERRITIN: CPT | Performed by: FAMILY MEDICINE

## 2017-10-09 PROCEDURE — 80050 GENERAL HEALTH PANEL: CPT | Performed by: FAMILY MEDICINE

## 2017-10-09 PROCEDURE — 82607 VITAMIN B-12: CPT | Performed by: FAMILY MEDICINE

## 2017-10-09 PROCEDURE — 36415 COLL VENOUS BLD VENIPUNCTURE: CPT | Performed by: FAMILY MEDICINE

## 2017-10-09 PROCEDURE — 86617 LYME DISEASE ANTIBODY: CPT | Mod: 90 | Performed by: FAMILY MEDICINE

## 2017-10-09 PROCEDURE — 86618 LYME DISEASE ANTIBODY: CPT | Performed by: FAMILY MEDICINE

## 2017-10-09 PROCEDURE — 86308 HETEROPHILE ANTIBODY SCREEN: CPT | Performed by: FAMILY MEDICINE

## 2017-10-09 RX ORDER — FLUTICASONE PROPIONATE AND SALMETEROL XINAFOATE 45; 21 UG/1; UG/1
2 AEROSOL, METERED RESPIRATORY (INHALATION) 2 TIMES DAILY
Qty: 12 G | Refills: 1 | Status: SHIPPED | OUTPATIENT
Start: 2017-10-09 | End: 2017-12-30

## 2017-10-09 RX ORDER — DIPHENHYDRAMINE HYDROCHLORIDE AND LIDOCAINE HYDROCHLORIDE AND ALUMINUM HYDROXIDE AND MAGNESIUM HYDRO
KIT
Qty: 237 ML | Refills: 1 | Status: SHIPPED | OUTPATIENT
Start: 2017-10-09 | End: 2018-01-11

## 2017-10-09 RX ORDER — AMOXICILLIN 875 MG
875 TABLET ORAL 2 TIMES DAILY
Qty: 20 TABLET | Refills: 0 | Status: SHIPPED | OUTPATIENT
Start: 2017-10-09 | End: 2018-01-11

## 2017-10-09 RX ORDER — ALBUTEROL SULFATE 90 UG/1
AEROSOL, METERED RESPIRATORY (INHALATION)
Refills: 3 | COMMUNITY
Start: 2016-12-08 | End: 2017-10-09

## 2017-10-09 NOTE — PROGRESS NOTES
SUBJECTIVE:   Abigail Harvey is a 41 year old female who presents to clinic today for the following health issues:      RESPIRATORY SYMPTOMS      Duration: on going since Mid August 2017    Description  sore throat, sweats, chills, fatigue/malaise and myalgias    Severity: moderate    Accompanying signs and symptoms: left gland swollen, headaches almost everyday, cannot sleep at night and wake up, low back pain, head pressure, throbbing in right low abdominal,       History (predisposing factors):  none    Therapies tried and outcome:  Ibuprofen, tylenol, cold and flu tylenol, massage, sleeping, exercising, fluid intake-helps a little   Patient would like to get an inhaler    Reports her normal is being an active mum of three kids busy with full time work, exercises , eats healthy& has a high pain tolerance usually.    Symptoms started 1.5 month ago. Has felt awful since end of august.had two weeks of a sore throat initially and, it hurt to swallow. She took ibuprofen . Denied any dental issues. After 2 weeks felt better. Then  Mid to late sept around 9/25 had 2 days of severe body aches, hurt from head to toe, felt flu like without any GI symptoms. Had a headache and couldn't stay asleep. For 3 days then felt chilled , had sweats & daily headaches, insomnia and had intense low back pain as if she was in labor. Then couple tsai later stated feeling fatigue could hardly stay awake. Starting in October has had headache daily, head pressure, but no head cold or URI symptoms, felt her lymph nodes swollen in her neck but just on the left side this time.also felt swollen right upper abdomen. Has been moving slowly, cant do her house work like she normally does. As she hurts all over and her  has been doing everything she normally does. No weight loss or night sweats. No current  fever or chills, no dizziness, no earache, sore throat, runny nose, no trouble hearing smelling,  Tasting, no chest pain,  or  palpitations, no heart burn , reflux, nausea or vomiting or diarrhea or constipation, no blood in stool or black stools, No urinary complaints. No pelvic complaints. No leg swelling or rash. Or joint pain. Mild congestion. Took an allergy pill didn't help.reports diagnosed by allergist to have asthma 1 yr or so ago. Was prescribed inhalers took for a while got better and then felt well and didn't take a long time. Recently feeling short of breath , feels has to gasp & take slow breaths. Desires to her inhalers but daughter also has asthma and does not know which inhalers are hers or her daughters and does not remember how to use them. Bought them all in so we can figure it out for her. Reports her asthma triggered by allergies and didn't even know she had asthma till allergist told her she had it last year. Has been taking ibuprofen & tylenol round the clock & feels it has not been helping. Having pain left throat. No swelling or trouble talking or swallowing her saliva. But miserable and would like to try an antibiotic.   Would like her flu shot too as here. currently no fever    Problem list and histories reviewed & adjusted, as indicated.  Additional history: as documented    Patient Active Problem List   Diagnosis     Chondromalacia of patella     Excessive or frequent menstruation     Past Surgical History:   Procedure Laterality Date      SECTION      x2     HC TOOTH EXTRACTION W/FORCEP         Social History   Substance Use Topics     Smoking status: Never Smoker     Smokeless tobacco: Never Used     Alcohol use Yes     Family History   Problem Relation Age of Onset     HEART DISEASE Father      heart attack     CANCER Father      lung     Hypertension Mother          No Known Allergies  Recent Labs   Lab Test  10/09/17   1700   ALT  111*   CR  0.69   GFRESTIMATED  >90   GFRESTBLACK  >90   POTASSIUM  4.0   TSH  1.22      BP Readings from Last 3 Encounters:   10/09/17 128/83   17 115/73    03/22/17 126/74    Wt Readings from Last 3 Encounters:   10/09/17 167 lb (75.8 kg)   08/29/17 170 lb (77.1 kg)   03/22/17 171 lb (77.6 kg)                  Labs reviewed in EPIC          Reviewed and updated as needed this visit by clinical staff     Reviewed and updated as needed this visit by Provider         ROS:  Constitutional, HEENT, cardiovascular, pulmonary, GI, , musculoskeletal, neuro, skin, endocrine and psych systems are negative, except as otherwise noted.      OBJECTIVE:   /83 (BP Location: Left arm, Patient Position: Sitting, Cuff Size: Adult Regular)  Pulse 79  Temp 98.5  F (36.9  C) (Oral)  Resp 19  Wt 167 lb (75.8 kg)  SpO2 98%  BMI 25.24 kg/m2  Body mass index is 25.24 kg/(m^2).  GENERAL: healthy, alert, mild distress, fatigued and appears older than stated age  EYES: Eyes grossly normal to inspection, PERRL and conjunctivae and sclerae normal  HENT: ear canals and TM's normal, nose and mouth without ulcers or lesions  HENT: normal cephalic/atraumatic, ear canals and TM's normal, nose and mouth without ulcers or lesions, oropharynx clear, oral mucous membranes moist, left tonsillar erythema and sinuses: not tender, no distortion of tonsillar pillars, uvula midline, no suggestion of peritonsillar abscess.   NECK: cervical adenopathy mild on left upper anterior , no asymmetry, masses, or scars, thyroid normal to palpation, trachea midline and normal to palpation, no carotid bruits and neck supple, no rigidity, kernig's sign negative.   RESP: lungs clear to auscultation - no rales, rhonchi or wheezes  CV: regular rate and rhythm, normal S1 S2, no S3 or S4, no murmur, click or rub, no peripheral edema and peripheral pulses strong  ABDOMEN: soft, non tender, no hepatosplenomegaly, no masses and bowel sounds normal  MS: no gross musculoskeletal defects noted, no edema  SKIN: no suspicious lesions or rashes  NEURO: Normal strength and tone, mentation intact and speech normal  PSYCH:  mentation appears normal, tearful, anxious, fatigued, judgement and insight intact and appearance well groomed    Diagnostic Test Results:  No results found for this or any previous visit (from the past 24 hour(s)).    ASSESSMENT/PLAN:   hx of menorrhagia on BCP S, Left thumb injury 2/2017 S/p hand therapy, chondromal patella, prior C /sections x2, tooth extraction, seen 5/29/17 for sore throat, strep negative. [place]  negative. Here for    1. Body aches  ? viral syndrome no meningitis signs, associated with swollen LN and fatigue and headache and worsening of her asthma. Labs today to evaluate symptoms. Could have been mono with ensuing fatigue. Refilled asthma meds. Review of med on chart show was prescribed Advair in the past. Take Advair 2 puffs twice a day. Albuterol as need. Flu shot given. Can do a trial of amoxil for tonsillitis but could be viral or mono driven. If no answer and headaches persists can consider seeing neuro and or doing abdominal imaging. No chest xray indicated currently. No steroid indicated currently. If gets worse call us or go to the Er  See  back in 2 weeks for a recheck. Try melatonin for sleep at night. Push fluids. Magic mouth wash every 6 hrs swish and spit or swallow for pain. If not better 48 hrs call us will do imaging of neck. Increase water intake  - CBC with platelets differential  - Comprehensive metabolic panel  - TSH with free T4 reflex  - Vitamin D Deficiency  - Vitamin B12  - Iron and iron binding capacity  - Ferritin  - Lyme Disease Melinda with reflex to WB Serum  - EBV Capsid Antibody IgM  - EBV Antibody to Early Antigen IgG  - Mononucleosis screen    2. Fatigue, unspecified type  Unclear etiology/diagnosis with uncertain prognosis requiring further workup below.  Do labs . Cbc,CMP, TSH, vit B , vit d , iron, lyme's, EBV antibodies.     3. Tonsillitis  Possible .  - amoxicillin (AMOXIL) 875 MG tablet; Take 1 tablet (875 mg) by mouth 2 times daily  Dispense: 20 tablet;  Refill: 0    4. Sore throat  - Mononucleosis screen  - magic mouthwash suspension (diphenhydramine, lidocaine, aluminum-magnesium & simethicone); Swish and spit every 6 hrs as needed  Dispense: 237 mL; Refill: 1    5. Mild persistent asthma with acute exacerbation  No wheezing, no prednisone currently. Renewed Advair. Use albuterol prn  - fluticasone-salmeterol (ADVAIR-HFA) 45-21 MCG/ACT inhaler; Inhale 2 puffs into the lungs 2 times daily  Dispense: 12 G; Refill: 1    6. Abdominal pain, right upper quadrant  Labs as above  - Mononucleosis screen    7. Need for prophylactic vaccination and inoculation against influenza  - VACCINE ADMINISTRATION, INITIAL  - HC FLU VAC PRESRV FREE QUAD SPLIT VIR 3+YRS IM       See Patient Instructions  Patient Instructions   Labs today to evaluate symptoms  Could have been mono with ensuing fatigue  Refilled asthma meds  Take Advair 2 puffs twice a day   Albuterol as need  Flu shot given  Can do a trial of amoxil for tonsillitis but could be viral or mono driven  If no answer and headaches persists can consider seeing neuro and or doing abdominal imaging  No chest xray indicated currently   No steroid indicated currently   If gets worse call us or go to the Er  See you back in 2 weeks for a recheck  Try melatonin for sleep at night   Push fluids  Magic mouth wash every 6 hrs swish and spit or swallow for pain  If not better 48 hrs call us will do imaging of neck           Beryl Patel MD  [facility] [place]   body aches

## 2017-10-09 NOTE — LETTER
October 11, 2017      Abigail Harvey  [address]  [facility] [address]        Dear ,    We are writing to inform you of your test results.    Results within acceptable limits.  -Vitamin D level is normal, 1000 IU daily in diet or supplements is recommended. .    Resulted Orders   CBC with platelets differential   Result Value Ref Range    WBC 10.9 4.0 - 11.0 10e9/L    RBC Count 4.43 3.8 - 5.2 10e12/L    Hemoglobin 13.6 11.7 - 15.7 g/dL    Hematocrit 39.7 35.0 - 47.0 %    MCV 90 78 - 100 fl    MCH 30.7 26.5 - 33.0 pg    MCHC 34.3 31.5 - 36.5 g/dL    RDW 12.1 10.0 - 15.0 %    Platelet Count 346 150 - 450 10e9/L    Diff Method Automated Method     % Neutrophils 78.1 %    % Lymphocytes 15.2 %    % Monocytes 5.7 %    % Eosinophils 0.6 %    % Basophils 0.4 %    Absolute Neutrophil 8.5 (H) 1.6 - 8.3 10e9/L    Absolute Lymphocytes 1.7 0.8 - 5.3 10e9/L    Absolute Monocytes 0.6 0.0 - 1.3 10e9/L    Absolute Eosinophils 0.1 0.0 - 0.7 10e9/L    Absolute Basophils 0.0 0.0 - 0.2 10e9/L   Vitamin D Deficiency   Result Value Ref Range    Vitamin D Deficiency screening 47 20 - 75 ug/L      Comment:      Season, race, dietary intake, and treatment affect the concentration of   25-hydroxy-Vitamin D. Values may decrease during winter months and increase   during summer months. Values 20-29 ug/L may indicate Vitamin D insufficiency   and values <20 ug/L may indicate Vitamin D deficiency.  Vitamin D determination is routinely performed by an immunoassay specific for   25 hydroxyvitamin D3.  If an individual is on vitamin D2 (ergocalciferol)   supplementation, please specify 25 OH vitamin D2 and D3 level determination by   LCMSMS test [id number].     Vitamin B12   Result Value Ref Range    Vitamin B12 518 193 - 986 pg/mL   Mononucleosis screen   Result Value Ref Range    Mononucleosis Screen Negative NEG^Negative       If you have any questions or concerns, please call the clinic at the number listed above.        Sincerely,        Beryl Patel MD/nr

## 2017-10-09 NOTE — PATIENT INSTRUCTIONS
Labs today to evaluate symptoms  Could have been mono with ensuing fatigue  Refilled asthma meds  Take Advair 2 puffs twice a day   Albuterol as need  Flu shot given  Can do a trial of amoxil for tonsillitis but could be viral or mono driven  If no answer and headaches persists can consider seeing neuro and or doing abdominal imaging  No chest xray indicated currently   No steroid indicated currently   If gets worse call us or go to the Er  See you back in 2 weeks for a recheck  Try melatonin for sleep at night   Push fluids  Magic mouth wash every 6 hrs swish and spit or swallow for pain  If not better 48 hrs call us will do imaging of neck

## 2017-10-09 NOTE — LETTER
October 11, 2017      Abigail FABRICIO Wes  [address]  [facility] [address]        Dear ,    We are writing to inform you of your test results.    Normal vit b 12  Lab said they didn't get all the blood at one time and were to see you back after you were done with me. But I didn't know that till after you left sorry about that. Was wondering if you had not done the additional  labs to get them done with them    Resulted Orders   CBC with platelets differential   Result Value Ref Range    WBC 10.9 4.0 - 11.0 10e9/L    RBC Count 4.43 3.8 - 5.2 10e12/L    Hemoglobin 13.6 11.7 - 15.7 g/dL    Hematocrit 39.7 35.0 - 47.0 %    MCV 90 78 - 100 fl    MCH 30.7 26.5 - 33.0 pg    MCHC 34.3 31.5 - 36.5 g/dL    RDW 12.1 10.0 - 15.0 %    Platelet Count 346 150 - 450 10e9/L    Diff Method Automated Method     % Neutrophils 78.1 %    % Lymphocytes 15.2 %    % Monocytes 5.7 %    % Eosinophils 0.6 %    % Basophils 0.4 %    Absolute Neutrophil 8.5 (H) 1.6 - 8.3 10e9/L    Absolute Lymphocytes 1.7 0.8 - 5.3 10e9/L    Absolute Monocytes 0.6 0.0 - 1.3 10e9/L    Absolute Eosinophils 0.1 0.0 - 0.7 10e9/L    Absolute Basophils 0.0 0.0 - 0.2 10e9/L   Vitamin D Deficiency   Result Value Ref Range    Vitamin D Deficiency screening 47 20 - 75 ug/L      Comment:      Season, race, dietary intake, and treatment affect the concentration of   25-hydroxy-Vitamin D. Values may decrease during winter months and increase   during summer months. Values 20-29 ug/L may indicate Vitamin D insufficiency   and values <20 ug/L may indicate Vitamin D deficiency.  Vitamin D determination is routinely performed by an immunoassay specific for   25 hydroxyvitamin D3.  If an individual is on vitamin D2 (ergocalciferol)   supplementation, please specify 25 OH vitamin D2 and D3 level determination by   LCMSMS test [id number].     Vitamin B12   Result Value Ref Range    Vitamin B12 518 193 - 986 pg/mL   Mononucleosis screen   Result Value Ref Range     Mononucleosis Screen Negative NEG^Negative       If you have any questions or concerns, please call the clinic at the number listed above.       Sincerely,        Beryl Patel MD/nr

## 2017-10-09 NOTE — MR AVS SNAPSHOT
After Visit Summary   10/9/2017    Abigail Harvey    MRN: [id number]           Patient Information     Date Of Birth          1975        Visit Information        Provider Department      10/9/2017 4:20 PM Beryl Patel MD [facility]        Today's Diagnoses     Body aches    -  1    Need for prophylactic vaccination and inoculation against influenza        Sore throat        Mild persistent asthma with acute exacerbation        Abdominal pain, right upper quadrant        Tonsillitis        Fatigue, unspecified type          Care Instructions    Labs today to evaluate symptoms  Could have been mono with ensuing fatigue  Refilled asthma meds  Take Advair 2 puffs twice a day   Albuterol as need  Flu shot given  Can do a trial of amoxil for tonsillitis but could be viral or mono driven  If no answer and headaches persists can consider seeing neuro and or doing abdominal imaging  No chest xray indicated currently   No steroid indicated currently   If gets worse call us or go to the Er  See you back in 2 weeks for a recheck  Try melatonin for sleep at night   Push fluids  Magic mouth wash every 6 hrs swish and spit or swallow for pain  If not better 48 hrs call us will do imaging of neck               Follow-ups after your visit        Who to contact     If you have questions or need follow up information about today's clinic visit or your schedule please contact [facility] directly at [phone].  Normal or non-critical lab and imaging results will be communicated to you by MyChart, letter or phone within 4 business days after the clinic has received the results. If you do not hear from us within 7 days, please contact the clinic through MyChart or phone. If you have a critical or abnormal lab result, we will notify you by phone as soon as possible.  Submit refill requests through [facility] or call your pharmacy and they will forward the refill request to us. Please  allow 3 business days for your refill to be completed.          Additional Information About Your Visit        MyChart Information     [facility] gives you secure access to your electronic health record. If you see a primary care provider, you can also send messages to your care team and make appointments. If you have questions, please call your primary care clinic.  If you do not have a primary care provider, please call [phone] and they will assist you.        Care EveryWhere ID     This is your Care EveryWhere ID. This could be used by other organizations to access your [facility] medical records  [id number]        Your Vitals Were     Pulse Temperature Respirations Pulse Oximetry BMI (Body Mass Index)       79 98.5  F (36.9  C) (Oral) 19 98% 25.24 kg/m2        Blood Pressure from Last 3 Encounters:   10/09/17 128/83   08/29/17 115/73   03/22/17 126/74    Weight from Last 3 Encounters:   10/09/17 167 lb (75.8 kg)   08/29/17 170 lb (77.1 kg)   03/22/17 171 lb (77.6 kg)              We Performed the Following     CBC with platelets differential     Comprehensive metabolic panel     EBV Antibody to Early Antigen IgG     EBV Capsid Antibody IgM     Ferritin     HC FLU VAC PRESRV FREE QUAD SPLIT VIR 3+YRS IM     Iron and iron binding capacity     Lyme Disease Melinda with reflex to WB Serum     Mononucleosis screen     TSH with free T4 reflex     VACCINE ADMINISTRATION, INITIAL     Vitamin B12     Vitamin D Deficiency          Today's Medication Changes          These changes are accurate as of: 10/9/17  5:23 PM.  If you have any questions, ask your nurse or doctor.               Start taking these medicines.        Dose/Directions    amoxicillin 875 MG tablet   Commonly known as:  AMOXIL   Used for:  Tonsillitis   Started by:  Beryl Patel MD        Dose:  875 mg   Take 1 tablet (875 mg) by mouth 2 times daily   Quantity:  20 tablet   Refills:  0       FIRST-MOUTHWASH BLM MT Susp compounding kit   Used for:  Sore  throat   Started by:  Beryl Patel MD        Swish and spit every 6 hrs as needed   Quantity:  237 mL   Refills:  1       fluticasone-salmeterol 45-21 MCG/ACT inhaler   Commonly known as:  ADVAIR-HFA   Used for:  Mild persistent asthma with acute exacerbation   Started by:  Beryl Patel MD        Dose:  2 puff   Inhale 2 puffs into the lungs 2 times daily   Quantity:  12 g   Refills:  1         Stop taking these medicines if you haven't already. Please contact your care team if you have questions.     VENTOLIN  (90 BASE) MCG/ACT Inhaler   Generic drug:  albuterol   Stopped by:  Beryl Patel MD                Where to get your medicines      These medications were sent to [place] [address] [place] [address] [address]     Phone:  [phone]     amoxicillin 875 MG tablet    FIRST-MOUTHWASH BLM MT Susp compounding kit    fluticasone-salmeterol 45-21 MCG/ACT inhaler                Primary Care Provider Office Phone # Fax #    Deqa Isaura Packer [phone] [address]  [facility] [address]        Equal Access to Services     [facility]: Hadii aad ku hadasho Soomaali, waaxda luqadaha, qaybta kaalmada adeegyada, leyla clancy. So [facility] [phone].    ATENCIÓN: Si habla español, tiene a byrd disposición servicios gratuitos de asistencia lingüística. [facility] [phone].    We comply with applicable federal civil rights laws and [place] laws. We do not discriminate on the basis of race, color, national origin, age, disability, sex, sexual orientation, or gender identity.            Thank you!     Thank you for choosing [facility]  for your care. Our goal is always to provide you with excellent care. Hearing back from our patients is one way we can continue to improve our services. Please take a few minutes to complete the written survey that you may receive in the mail after your  visit with us. Thank you!             Your Updated Medication List - Protect others around you: Learn how to safely use, store and throw away your medicines at www.disposemymeds.org.          This list is accurate as of: 10/9/17  5:23 PM.  Always use your most recent med list.                   Brand Name Dispense Instructions for use Diagnosis    amoxicillin 875 MG tablet    AMOXIL    20 tablet    Take 1 tablet (875 mg) by mouth 2 times daily    Tonsillitis       CLARITIN 10 MG tablet   Generic drug:  loratadine     30 tablet    Take 1 tablet (10 mg) by mouth daily        FIRST-MOUTHWASH BLM MT Susp compounding kit     237 mL    Swish and spit every 6 hrs as needed    Sore throat       fluticasone-salmeterol 45-21 MCG/ACT inhaler    ADVAIR-HFA    12 g    Inhale 2 puffs into the lungs 2 times daily    Mild persistent asthma with acute exacerbation       levonorgestrel-ethinyl estradiol 0.15-0.03 MG per tablet    JOLESSA    84 tablet    Take 1 tablet by mouth daily    Excessive or frequent menstruation       olopatadine 0.1 % ophthalmic solution    PATANOL    5 mL    Place 1 drop into both eyes 2 times daily    Allergic conjunctivitis

## 2017-10-09 NOTE — NURSING NOTE
Injectable Influenza Immunization Documentation    1.  Is the person to be vaccinated sick today?   No    2. Does the person to be vaccinated have an allergy to a component   of the vaccine?   No    3. Has the person to be vaccinated ever had a serious reaction   to influenza vaccine in the past?   No    4. Has the person to be vaccinated ever had Guillain-Barré syndrome?   No    Form completed by patient    Sebastián Jonas CMA    Screening Questionnaire for Adult Immunization     Are you sick today?   No    Do you have allergies to medications, food or any vaccine?   No    Have you ever had a serious reaction after receiving a vaccination?   No    Do you have a long-term health problem with heart disease, lung disease,  asthma, kidney disease, diabetes, anemia, metabolic or blood disease?   Yes    Do you have cancer, leukemia, AIDS, or any immune system problem?   No    Do you take cortisone, prednisone, other steroids, or anticancer drugs, or  have you had any x-ray (radiation) treatments?   No    Have you had a seizure, brain, or other nervous system problem?   No    During the past year, have you received a transfusion of blood or blood       products, or been given a medicine called immune (gamma) globulin?   No    For women: Are you pregnant or is there a chance you could become         pregnant during the next month?   No    Have you received any vaccinations in the past 4 weeks?   No     Immunization questionnaire was positive for at least one answer.  Notified Dr. Patel.      [facility] doesn't apply on this patient      Per orders of Dr. Patel, injection of flu given by Sebastián Jonas. Patient instructed to remain in clinic for 20 minutes afterwards, and to report any adverse reaction to me immediately.    Prior to injection verified patient identity using patient's name and date of birth.         Screening performed by Sebastián Jonas CMA

## 2017-10-09 NOTE — NURSING NOTE
"Chief Complaint   Patient presents with     URI       Initial /83 (BP Location: Left arm, Patient Position: Sitting, Cuff Size: Adult Regular)  Pulse 79  Temp 98.5  F (36.9  C) (Oral)  Resp 19  Wt 167 lb (75.8 kg)  SpO2 98%  BMI 25.24 kg/m2 Estimated body mass index is 25.24 kg/(m^2) as calculated from the following:    Height as of 2/24/17: 5' 8.2\" (1.732 m).    Weight as of this encounter: 167 lb (75.8 kg).  Medication Reconciliation: complete     Sebastián Jonas CMA  "

## 2017-10-09 NOTE — LETTER
October 10, 2017      Abigail FABRICIO Wes  [address]  [facility] [address]      Dear ,    We are writing to inform you of your test results.    Results within acceptable limits.  -Normal red blood cell (hgb) levels, normal white blood cell count and normal platelet levels. Prelim mono test negative. rest of test pending..  Resulted Orders   CBC with platelets differential   Result Value Ref Range    WBC 10.9 4.0 - 11.0 10e9/L    RBC Count 4.43 3.8 - 5.2 10e12/L    Hemoglobin 13.6 11.7 - 15.7 g/dL    Hematocrit 39.7 35.0 - 47.0 %    MCV 90 78 - 100 fl    MCH 30.7 26.5 - 33.0 pg    MCHC 34.3 31.5 - 36.5 g/dL    RDW 12.1 10.0 - 15.0 %    Platelet Count 346 150 - 450 10e9/L    Diff Method Automated Method     % Neutrophils 78.1 %    % Lymphocytes 15.2 %    % Monocytes 5.7 %    % Eosinophils 0.6 %    % Basophils 0.4 %    Absolute Neutrophil 8.5 (H) 1.6 - 8.3 10e9/L    Absolute Lymphocytes 1.7 0.8 - 5.3 10e9/L    Absolute Monocytes 0.6 0.0 - 1.3 10e9/L    Absolute Eosinophils 0.1 0.0 - 0.7 10e9/L    Absolute Basophils 0.0 0.0 - 0.2 10e9/L   Mononucleosis screen   Result Value Ref Range    Mononucleosis Screen Negative NEG^Negative   If you have any questions or concerns, please call the clinic at the number listed above.   Sincerely,  Beryl Patel MD/nr

## 2017-10-09 NOTE — PROGRESS NOTES
Results within acceptable limits.  -Normal red blood cell (hgb) levels, normal white blood cell count and normal platelet levels. Prelim mono test negative. rest of test pending..

## 2017-10-10 ENCOUNTER — TELEPHONE (OUTPATIENT)
Dept: FAMILY MEDICINE | Facility: CLINIC | Age: 42
End: 2017-10-10

## 2017-10-10 DIAGNOSIS — R10.11 RUQ ABDOMINAL PAIN: ICD-10-CM

## 2017-10-10 DIAGNOSIS — R79.89 ELEVATED FERRITIN: ICD-10-CM

## 2017-10-10 DIAGNOSIS — R74.8 ELEVATED LIVER ENZYMES: Primary | ICD-10-CM

## 2017-10-10 LAB
ALBUMIN SERPL-MCNC: 3.5 G/DL (ref 3.4–5)
ALP SERPL-CCNC: 224 U/L (ref 40–150)
ALT SERPL W P-5'-P-CCNC: 111 U/L (ref 0–50)
ANION GAP SERPL CALCULATED.3IONS-SCNC: 15 MMOL/L (ref 3–14)
AST SERPL W P-5'-P-CCNC: 52 U/L (ref 0–45)
BILIRUB SERPL-MCNC: 0.6 MG/DL (ref 0.2–1.3)
BUN SERPL-MCNC: 10 MG/DL (ref 7–30)
CALCIUM SERPL-MCNC: 8.8 MG/DL (ref 8.5–10.1)
CHLORIDE SERPL-SCNC: 100 MMOL/L (ref 94–109)
CO2 SERPL-SCNC: 21 MMOL/L (ref 20–32)
CREAT SERPL-MCNC: 0.69 MG/DL (ref 0.52–1.04)
DEPRECATED CALCIDIOL+CALCIFEROL SERPL-MC: 47 UG/L (ref 20–75)
FERRITIN SERPL-MCNC: 214 NG/ML (ref 12–150)
GFR SERPL CREATININE-BSD FRML MDRD: >90 ML/MIN/1.7M2
GLUCOSE SERPL-MCNC: 70 MG/DL (ref 70–99)
IRON SATN MFR SERPL: 8 % (ref 15–46)
IRON SERPL-MCNC: 29 UG/DL (ref 35–180)
POTASSIUM SERPL-SCNC: 4 MMOL/L (ref 3.4–5.3)
PROT SERPL-MCNC: 8.1 G/DL (ref 6.8–8.8)
SODIUM SERPL-SCNC: 136 MMOL/L (ref 133–144)
TIBC SERPL-MCNC: 352 UG/DL (ref 240–430)
TSH SERPL DL<=0.005 MIU/L-ACNC: 1.22 MU/L (ref 0.4–4)
VIT B12 SERPL-MCNC: 518 PG/ML (ref 193–986)

## 2017-10-10 NOTE — TELEPHONE ENCOUNTER
Looks like also has iron deficiency as iron low. Ferritin is up suggesting inflammation likely from current illness otherwise usually low with iron deficiency. Should recheck this in future when better

## 2017-10-10 NOTE — PROGRESS NOTES
Results within acceptable limits.  -Vitamin D level is normal, 1000 IU daily in diet or supplements is recommended. .

## 2017-10-10 NOTE — TELEPHONE ENCOUNTER
Liver tests elevated  Kidney and electrolytes fine   Can occur with viral infections like infectious mononucleosis or hepatitis , when liver inflamed can cause sensation of pain right upper abdomen side like she told me  Rest of test pending but would like her to recheck liver test in few days and will also check hep a, b, c along with that to see whats the cause. If remains elevated would recommend abdominal imaging like an ultrasound to start of with.

## 2017-10-10 NOTE — PROGRESS NOTES
Normal vit b 12  Lab said they didn't get all the blood at one time and were to see you back after you were done with me. But I didn't know that till after you left sorry about that. Was wondering if you had not done the additional  labs to get them done with them

## 2017-10-11 LAB
B BURGDOR IGG+IGM SER QL: 6.79 (ref 0–0.89)
EBV EA-D IGG SER-ACNC: <0.2 AI (ref 0–0.8)
EBV VCA IGM SER QL IA: <0.2 AI (ref 0–0.8)

## 2017-10-11 NOTE — TELEPHONE ENCOUNTER
LM on pt's home #. Please call clinic to talk to any triage nurse for message from a clinic provider.  MARLENA De La Paz

## 2017-10-12 ENCOUNTER — TELEPHONE (OUTPATIENT)
Dept: FAMILY MEDICINE | Facility: CLINIC | Age: 42
End: 2017-10-12

## 2017-10-12 NOTE — TELEPHONE ENCOUNTER
Patient informed as below.  Patient also calling back about cold sore.  Please see that encounter.  Iveth Batista RN

## 2017-10-12 NOTE — TELEPHONE ENCOUNTER
Dr. Patel- wanted to check with you to see if cold sores were discussed at 10/9/17 office visit?  Otherwise, writer was going to recommend patient either do E[facility], [facility], telephone visit or office visit.    Thank you!  BONIFACIO Miguel, BSN, RN

## 2017-10-12 NOTE — TELEPHONE ENCOUNTER
Yes can come on Monday fo rlabs  Ferritin added  Of note negative for infectious mono EBV virus   But lymes test prelim positive and awaiting final confirmatory test on that.   Glad she is feeling better  If lymes truly positive while amoxil given also treats that may have to add doxycyline so will be in touch once that is back

## 2017-10-12 NOTE — TELEPHONE ENCOUNTER
Lft message on voice mail for patient to call back for a new message from Dr Patel.  Patient does have an appointment for labs Monday but when I spoke to her earlier on 10/12/17, did not have the information from Dr Patel that patient had positive preliminary Lymes test.  Iveth Batista RN

## 2017-10-12 NOTE — TELEPHONE ENCOUNTER
Reason for call:  Patient reporting a symptom    Symptom or request: Oncoming cold sore, patient states she feels tingling which often indicates that she is getting a cold sore.    Duration (how long have symptoms been present): 1 day    Have you been treated for this before? Yes    Additional comments: The patient is requesting a prescription and uses the [facility] on [facility].    Phone Number patient can be reached at:  Home number on file [phone] (home)    Best Time:      Can we leave a detailed message on this number:  YES    Call taken on 10/12/2017 at 10:05 AM by Alma Rosa Griffin
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
## 2017-10-12 NOTE — TELEPHONE ENCOUNTER
Cols sore not discussed at visit. recomend abreva otc  5 times a day to cold sore. dont want to add any more meds then necessary with liver test being elevated

## 2017-10-13 ENCOUNTER — TELEPHONE (OUTPATIENT)
Dept: FAMILY MEDICINE | Facility: CLINIC | Age: 42
End: 2017-10-13

## 2017-10-13 DIAGNOSIS — R74.8 ELEVATED LIVER ENZYMES: ICD-10-CM

## 2017-10-13 DIAGNOSIS — R10.11 RUQ ABDOMINAL PAIN: ICD-10-CM

## 2017-10-13 DIAGNOSIS — A69.20 LYME DISEASE: Primary | ICD-10-CM

## 2017-10-13 DIAGNOSIS — R79.89 ELEVATED FERRITIN: ICD-10-CM

## 2017-10-13 LAB
B BURGDOR IGG SER QL IB: POSITIVE
B BURGDOR IGM SER QL IB: POSITIVE

## 2017-10-13 PROCEDURE — 80076 HEPATIC FUNCTION PANEL: CPT | Performed by: FAMILY MEDICINE

## 2017-10-13 PROCEDURE — 86803 HEPATITIS C AB TEST: CPT | Performed by: FAMILY MEDICINE

## 2017-10-13 PROCEDURE — 86705 HEP B CORE ANTIBODY IGM: CPT | Performed by: FAMILY MEDICINE

## 2017-10-13 PROCEDURE — 99000 SPECIMEN HANDLING OFFICE-LAB: CPT | Performed by: FAMILY MEDICINE

## 2017-10-13 PROCEDURE — 82728 ASSAY OF FERRITIN: CPT | Performed by: FAMILY MEDICINE

## 2017-10-13 PROCEDURE — 87340 HEPATITIS B SURFACE AG IA: CPT | Performed by: FAMILY MEDICINE

## 2017-10-13 PROCEDURE — 36415 COLL VENOUS BLD VENIPUNCTURE: CPT | Performed by: FAMILY MEDICINE

## 2017-10-13 PROCEDURE — 86708 HEPATITIS A ANTIBODY: CPT | Performed by: FAMILY MEDICINE

## 2017-10-13 PROCEDURE — 87350 HEPATITIS BE AG IA: CPT | Mod: 90 | Performed by: FAMILY MEDICINE

## 2017-10-13 PROCEDURE — 86706 HEP B SURFACE ANTIBODY: CPT | Performed by: FAMILY MEDICINE

## 2017-10-13 PROCEDURE — 86707 HEPATITIS BE ANTIBODY: CPT | Mod: 90 | Performed by: FAMILY MEDICINE

## 2017-10-13 PROCEDURE — 86709 HEPATITIS A IGM ANTIBODY: CPT | Performed by: FAMILY MEDICINE

## 2017-10-13 PROCEDURE — 86704 HEP B CORE ANTIBODY TOTAL: CPT | Performed by: FAMILY MEDICINE

## 2017-10-13 RX ORDER — DOXYCYCLINE HYCLATE 100 MG
100 TABLET ORAL 2 TIMES DAILY
Qty: 28 TABLET | Refills: 0 | Status: SHIPPED | OUTPATIENT
Start: 2017-10-13 | End: 2017-10-16

## 2017-10-13 NOTE — LETTER
October 16, 2017      Abigail Harvey  [address]  [facility] [address]        Dear Abigail,    Results within acceptable limits.  Liver tests improving.  Re-check in 2 weeks till back to normal.    Results for orders placed or performed in visit on 10/13/17   Hepatic panel (Albumin, ALT, AST, Bili, Alk Phos, TP)   Result Value Ref Range    Bilirubin Direct 0.1 0.0 - 0.2 mg/dL    Bilirubin Total 0.4 0.2 - 1.3 mg/dL    Albumin 3.3 (L) 3.4 - 5.0 g/dL    Protein Total 8.3 6.8 - 8.8 g/dL    Alkaline Phosphatase 176 (H) 40 - 150 U/L    ALT 72 (H) 0 - 50 U/L    AST 43 0 - 45 U/L   Hepatitis A antibody IgM   Result Value Ref Range    Hepatitis A IgM Melinda Nonreactive NR^Nonreactive   Hepatitis B Surface Antibody   Result Value Ref Range    Hepatitis B Surface Antibody >1000.00 (H) <8.00 m[IU]/mL   Hepatitis B core antibody   Result Value Ref Range    Hepatitis B Core Melinda Nonreactive NR^Nonreactive   Hepatitis B core antibody IgM   Result Value Ref Range    Hepatitis B Core IgM Nonreactive NR^Nonreactive   Hepatitis B surface antigen   Result Value Ref Range    Hep B Surface Agn Nonreactive NR^Nonreactive   Hepatitis A Antibody IgG   Result Value Ref Range    Hepatitis A Antibody IgG Reactive (A) NR^Nonreactive   Hepatitis C Screen Reflex to HCV RNA Quant and Genotype   Result Value Ref Range    Hepatitis C Antibody Nonreactive NR^Nonreactive     Sincerely,    Rylie Packer MD/teena

## 2017-10-13 NOTE — LETTER
October 18, 2017      Abigail FABRICIO Wes  [address]  [facility] [address]        Dear ,    We are writing to inform you of your test results.    Ferritin ( inflammation marker ) remains elevated  Negative for Hepatitis A, B or C infection  Shows immunity to hepatitis A and B likely from prior vaccinations    Resulted Orders   Hepatic panel (Albumin, ALT, AST, Bili, Alk Phos, TP)   Result Value Ref Range    Bilirubin Direct 0.1 0.0 - 0.2 mg/dL    Bilirubin Total 0.4 0.2 - 1.3 mg/dL    Albumin 3.3 (L) 3.4 - 5.0 g/dL    Protein Total 8.3 6.8 - 8.8 g/dL    Alkaline Phosphatase 176 (H) 40 - 150 U/L    ALT 72 (H) 0 - 50 U/L    AST 43 0 - 45 U/L   Hepatitis A antibody IgM   Result Value Ref Range    Hepatitis A IgM Melinda Nonreactive NR^Nonreactive      Comment:      IgM anti-HAV not detected. Does not exclude the possibility of recent exposure   to or infection with HAV.     Hepatitis B Surface Antibody   Result Value Ref Range    Hepatitis B Surface Antibody >1000.00 (H) <8.00 m[IU]/mL      Comment:      Reactive, Patient is considered to be immune to infection with hepatitis B   when the value is greater than or equal to 12.0 m[IU]/mL.     Hepatitis B core antibody   Result Value Ref Range    Hepatitis B Core Melinda Nonreactive NR^Nonreactive   Hepatitis B core antibody IgM   Result Value Ref Range    Hepatitis B Core IgM Nonreactive NR^Nonreactive      Comment:      A nonreactive result suggests lack of recent exposure to the virus in the   preceding 6 months.     Hepatitis B surface antigen   Result Value Ref Range    Hep B Surface Agn Nonreactive NR^Nonreactive   Hepatitis A Antibody IgG   Result Value Ref Range    Hepatitis A Antibody IgG Reactive (A) NR^Nonreactive      Comment:      This assay cannot be used for the diagnosis of acute HAV infection.   Hepatitis C Screen Reflex to HCV RNA Quant and Genotype   Result Value Ref Range    Hepatitis C Antibody Nonreactive NR^Nonreactive      Comment:      Assay  performance characteristics have not been established for newborns,   infants, and children     Ferritin   Result Value Ref Range    Ferritin 200 (H) 12 - 150 ng/mL       If you have any questions or concerns, please call the clinic at the number listed above.       Sincerely,    Beryl Patel MD/jannette

## 2017-10-13 NOTE — TELEPHONE ENCOUNTER
Left message to call back and ask to speak with an available triage nurse.  MICHELLE SalcedoN, RN

## 2017-10-13 NOTE — TELEPHONE ENCOUNTER
Positive for lymes  This is probably reason for all her symptoms  Recommend doxycycline 100 mg twice a day for 28 days . Can complete Augmentin first   Recommend seeing infectious disease if has further concerns or for further evaluation   Eat a probiotic daily while on antibiotics  Stay out of sunlight while on doxycycline  Still do labs on Monday as planned to make sure liver test better   Avoid mouth wash while on doxycycline

## 2017-10-14 LAB
ALBUMIN SERPL-MCNC: 3.3 G/DL (ref 3.4–5)
ALP SERPL-CCNC: 176 U/L (ref 40–150)
ALT SERPL W P-5'-P-CCNC: 72 U/L (ref 0–50)
AST SERPL W P-5'-P-CCNC: 43 U/L (ref 0–45)
BILIRUB DIRECT SERPL-MCNC: 0.1 MG/DL (ref 0–0.2)
BILIRUB SERPL-MCNC: 0.4 MG/DL (ref 0.2–1.3)
PROT SERPL-MCNC: 8.3 G/DL (ref 6.8–8.8)

## 2017-10-15 ENCOUNTER — NURSE TRIAGE (OUTPATIENT)
Dept: NURSING | Facility: CLINIC | Age: 42
End: 2017-10-15

## 2017-10-15 NOTE — TELEPHONE ENCOUNTER
Patient retuning message from clinic left on Friday, gave her reuslts as documented and antcipated plan of care. She would like call back form PCP to confirm start date for doxycycline. She would like to see if she can start on Monday or if she has to wait until all the Augmentin is gone before she can start. She is still feeling pretty back with fatigue and muscle aches. She would also like the doxycycline sent to the [facility] pharmacy on [address] versus the clinic. Will send message to care team.  She also was transferred to [facility] to set up follow up lab appointment for 2 weeks out.     Ayla Almendarez, RN, BSN  [facility]

## 2017-10-15 NOTE — TELEPHONE ENCOUNTER
Patient retuning message from clinic left on Friday, gave her reuslts as documented and antcipated plan of care. She would like call back form PCP to confirm start date for doxycycline. She would like to see if she can start on Monday or if she has to wait until all the Augmentin is gone before she can start. She is still feeling pretty back with fatigue and muscle aches. She would also like the doxycycline sent to the [facility] pharmacy on [address] versus the clinic. Will send message to care team.  She also was transferred to scheduling to set up follow up lab appointment for 2 weeks out.     Please follow up with patient regarding status of starting the new medication.     Ayla Almendarez, RN, BSN  [facility]

## 2017-10-16 LAB
HAV IGG SER QL IA: REACTIVE
HAV IGM SERPL QL IA: NONREACTIVE
HBV CORE AB SERPL QL IA: NONREACTIVE
HBV CORE IGM SERPL QL IA: NONREACTIVE
HBV SURFACE AB SERPL IA-ACNC: >1000 M[IU]/ML
HBV SURFACE AG SERPL QL IA: NONREACTIVE
HCV AB SERPL QL IA: NONREACTIVE

## 2017-10-16 RX ORDER — DOXYCYCLINE HYCLATE 100 MG
100 TABLET ORAL 2 TIMES DAILY
Qty: 28 TABLET | Refills: 0 | Status: SHIPPED | OUTPATIENT
Start: 2017-10-16 | End: 2018-01-11

## 2017-10-16 NOTE — TELEPHONE ENCOUNTER
Warnings Override History for doxycycline (VIBRA-TABS) 100 MG tablet [[id number]]        Overridden by Beryl Patel MD on 10/13/17 1573       Drug-Drug       1. MAGNESIUM SALTS / TETRACYCLINES [Level: Moderate]       Other Orders: magic mouthwash suspension (diphenhydramine, lidocaine, aluminum-magnesium & simethicone)          2. ALUMINUM SALTS / TETRACYCLINES [Level: Moderate]       Other Orders: magic mouthwash suspension (diphenhydramine, lidocaine, aluminum-magnesium & simethicone)             Doxycycline sent to patient's preferred pharmacy.    Called patient and reviewed message per below from Dr. Zepeda.    Patient verbalized understanding and stated she will wait until finished with amoxicillin prior to starting doxycycline.    Patient then asked about 10/13/17 lab results and writer informed patient they are in process but patient will be contacted once they are back.    BONIFACIO Miguel, MICHELLEN, RN

## 2017-10-16 NOTE — TELEPHONE ENCOUNTER
It looks like she is probably on day 8 (of 10 day course) of amoxicillin so she's nearing the end of that.  If she is anxious to get started on doxycycline she can start that now - some overlap with amoxicillin is ok.  The only concern with doubling up on antibiotics is that it may be harder on her gut (e.g., diarrhea) so I would recommend she take probiotics and/or eat yogurt daily.      Rhina Zepeda MD

## 2017-10-17 ENCOUNTER — TELEPHONE (OUTPATIENT)
Dept: FAMILY MEDICINE | Facility: CLINIC | Age: 42
End: 2017-10-17

## 2017-10-17 DIAGNOSIS — R79.89 ELEVATED FERRITIN: ICD-10-CM

## 2017-10-17 DIAGNOSIS — A69.20 LYME DISEASE: Primary | ICD-10-CM

## 2017-10-17 DIAGNOSIS — R74.8 ELEVATED LIVER ENZYMES: ICD-10-CM

## 2017-10-17 LAB — FERRITIN SERPL-MCNC: 200 NG/ML (ref 12–150)

## 2017-10-17 NOTE — TELEPHONE ENCOUNTER
Patient has lab only appt on 10/27/17, no orders in chart.  Please place FUTURE orders in Epic if appropriate.    Thanks, lab

## 2017-10-17 NOTE — TELEPHONE ENCOUNTER
Missing during the OV.    Called pt and left a Vm to call us back and go over ACT.    Odessa Fong MA

## 2017-10-17 NOTE — PROGRESS NOTES
Ferritin ( inflammation marker ) remains elevated  Negative for Hepatitis A, B or C infection  Shows immunity to hepatitis A and B likely from prior vaccinations

## 2017-10-19 ASSESSMENT — ASTHMA QUESTIONNAIRES: ACT_TOTALSCORE: 21

## 2017-10-20 LAB
HBV E AB SERPL QL IA: NEGATIVE
HBV E AG SERPL QL IA: NEGATIVE

## 2017-10-23 ENCOUNTER — TELEPHONE (OUTPATIENT)
Dept: FAMILY MEDICINE | Facility: CLINIC | Age: 42
End: 2017-10-23

## 2017-10-23 NOTE — TELEPHONE ENCOUNTER
Called patient and reviewed message per below from Dr. Patel.    Patient verbalized understanding and in agreement with plan.    Patient stated she will not drink any alcohol.    MICHELLE SalcedoN, RN

## 2017-10-23 NOTE — TELEPHONE ENCOUNTER
Per review of patient instructions for Doxycycline on [employer], alcohol use is not addressed.    Dr. Patel-Please advise.    Thank you!  MICHELLE SalcedoN, RN

## 2017-10-23 NOTE — TELEPHONE ENCOUNTER
Would avoid wine to be safe for duration of antibiotic. If feels really needs to drink 1/2 glass in 24 hr period

## 2017-10-23 NOTE — TELEPHONE ENCOUNTER
Reason for call:  Other   Patient called regarding (reason for call): prescription  Additional comments: patient is wondering if she can drink wine while taking antibiotic for lyme diease      Phone number to reach patient:  Home number on file [phone] (home)    Best Time:  Any time    Can we leave a detailed message on this number?  YES

## 2017-10-27 DIAGNOSIS — R74.8 ELEVATED LIVER ENZYMES: ICD-10-CM

## 2017-10-27 DIAGNOSIS — A69.20 LYME DISEASE: ICD-10-CM

## 2017-10-27 DIAGNOSIS — R79.89 ELEVATED FERRITIN: ICD-10-CM

## 2017-10-27 LAB
ALBUMIN SERPL-MCNC: 3.4 G/DL (ref 3.4–5)
ALP SERPL-CCNC: 89 U/L (ref 40–150)
ALT SERPL W P-5'-P-CCNC: 41 U/L (ref 0–50)
AST SERPL W P-5'-P-CCNC: 25 U/L (ref 0–45)
BILIRUB DIRECT SERPL-MCNC: 0.1 MG/DL (ref 0–0.2)
BILIRUB SERPL-MCNC: 0.4 MG/DL (ref 0.2–1.3)
FERRITIN SERPL-MCNC: 109 NG/ML (ref 12–150)
PROT SERPL-MCNC: 7.5 G/DL (ref 6.8–8.8)

## 2017-10-27 PROCEDURE — 36415 COLL VENOUS BLD VENIPUNCTURE: CPT | Performed by: FAMILY MEDICINE

## 2017-10-27 PROCEDURE — 80076 HEPATIC FUNCTION PANEL: CPT | Performed by: FAMILY MEDICINE

## 2017-10-27 PROCEDURE — 82728 ASSAY OF FERRITIN: CPT | Performed by: FAMILY MEDICINE

## 2017-10-27 NOTE — PROGRESS NOTES
Results within acceptable limits.  -Liver and gallbladder tests (ALT,AST, Alk phos,bilirubin) are now  Normal.  Ferritin not back yet  Hope you are feeling better

## 2017-10-27 NOTE — LETTER
October 30, 2017      Abigail E Wes  [address]  [facility] [address]        Dear ,    We are writing to inform you of your test results.    Results within acceptable limits. Ferritin also normal. It is an acute phase reaction the fact that both ferritin and liver tests now normal suggest the infection is treated. Recommend completing antibiotic course as prescribed to completely heal you    Resulted Orders   Hepatic panel (Albumin, ALT, AST, Bili, Alk Phos, TP)   Result Value Ref Range    Bilirubin Direct 0.1 0.0 - 0.2 mg/dL    Bilirubin Total 0.4 0.2 - 1.3 mg/dL    Albumin 3.4 3.4 - 5.0 g/dL    Protein Total 7.5 6.8 - 8.8 g/dL    Alkaline Phosphatase 89 40 - 150 U/L    ALT 41 0 - 50 U/L    AST 25 0 - 45 U/L   Ferritin   Result Value Ref Range    Ferritin 109 12 - 150 ng/mL       If you have any questions or concerns, please call the clinic at the number listed above.       Sincerely,      Beryl Patel MD/nr

## 2017-10-27 NOTE — PROGRESS NOTES
Results within acceptable limits. Ferritin also normal. It is an acute phase reaction the fact that both ferritin and liver tests now normal suggest the infection is treated. Recommend completing antibiotic course as prescribed to completely heal you

## 2017-10-27 NOTE — LETTER
October 30, 2017      Abigail FABRICIO Wes  [address]  [facility] [address]        Dear ,    We are writing to inform you of your test results.    Results within acceptable limits.  -Liver and gallbladder tests (ALT,AST, Alk phos,bilirubin) are now  Normal.  Ferritin not back yet  Hope you are feeling better    Resulted Orders   Hepatic panel (Albumin, ALT, AST, Bili, Alk Phos, TP)   Result Value Ref Range    Bilirubin Direct 0.1 0.0 - 0.2 mg/dL    Bilirubin Total 0.4 0.2 - 1.3 mg/dL    Albumin 3.4 3.4 - 5.0 g/dL    Protein Total 7.5 6.8 - 8.8 g/dL    Alkaline Phosphatase 89 40 - 150 U/L    ALT 41 0 - 50 U/L    AST 25 0 - 45 U/L   Ferritin   Result Value Ref Range    Ferritin 109 12 - 150 ng/mL       If you have any questions or concerns, please call the clinic at the number listed above.       Sincerely,    Beryl Patel MD/nr

## 2017-12-04 ENCOUNTER — TELEPHONE (OUTPATIENT)
Dept: FAMILY MEDICINE | Facility: CLINIC | Age: 42
End: 2017-12-04

## 2017-12-04 NOTE — TELEPHONE ENCOUNTER
[facility] tick-borne disease case report form completed to the best of writer's ability via chart review and faxed to [facility].    MICHELLE ReedN, RN

## 2017-12-30 DIAGNOSIS — J45.31 MILD PERSISTENT ASTHMA WITH ACUTE EXACERBATION: ICD-10-CM

## 2018-01-02 RX ORDER — FLUTICASONE PROPIONATE AND SALMETEROL XINAFOATE 45; 21 UG/1; UG/1
AEROSOL, METERED RESPIRATORY (INHALATION)
Qty: 12 G | Refills: 3 | Status: SHIPPED | OUTPATIENT
Start: 2018-01-02 | End: 2018-01-11 | Stop reason: DRUGHIGH

## 2018-01-02 NOTE — TELEPHONE ENCOUNTER
10/9/17 was last ov.  ACT Total Scores 10/18/2017   ACT TOTAL SCORE (Goal Greater than or Equal to 20) 21   In the past 12 months, how many times did you visit the emergency room for your asthma without being admitted to the hospital? 0   In the past 12 months, how many times were you hospitalized overnight because of your asthma? 0     Prescription approved per [facility] Refill Protocol.  MARLENA De La Paz

## 2018-01-07 DIAGNOSIS — J45.31 MILD PERSISTENT ASTHMA WITH ACUTE EXACERBATION: ICD-10-CM

## 2018-01-08 NOTE — TELEPHONE ENCOUNTER
"Requested Prescriptions   Pending Prescriptions Disp Refills     ADVAIR HFA 45-21 MCG/ACT inhaler [Pharmacy Med Name: ADVAIR HFA 45-21 MCG INHALER]  Last Written Prescription Date:  1/2/2018  Last Fill Quantity: 12g,  # refills: 3   Last Office Visit with [facility], P or [facility] prescribing provider:  10/9/2017   Future Office Visit:      12 Inhaler 1     Sig: INHALE 2 PUFFS INTO THE LUNGS 2 TIMES DAILY    Inhaled Steroids Protocol Passed    1/7/2018 11:16 AM   ACT Total Scores 10/18/2017   ACT TOTAL SCORE (Goal Greater than or Equal to 20) 21   In the past 12 months, how many times did you visit the emergency room for your asthma without being admitted to the hospital? 0   In the past 12 months, how many times were you hospitalized overnight because of your asthma? 0       Passed - Patient is age 12 or older       Passed - Asthma control test 20 or greater in last 6 months    Please review ACT score.          Passed - Recent (6 mo) or future visit with authorizing provider's specialty    Patient had office visit in the last 6 months or has a visit in the next 30 days with authorizing provider.  See \"Choose Columns\" in Meds & Orders section of the refill encounter.             "

## 2018-01-10 RX ORDER — FLUTICASONE PROPIONATE AND SALMETEROL XINAFOATE 45; 21 UG/1; UG/1
AEROSOL, METERED RESPIRATORY (INHALATION)
Qty: 12 INHALER | Refills: 1 | OUTPATIENT
Start: 2018-01-10

## 2018-01-11 ENCOUNTER — OFFICE VISIT (OUTPATIENT)
Dept: FAMILY MEDICINE | Facility: CLINIC | Age: 43
End: 2018-01-11
Payer: COMMERCIAL

## 2018-01-11 VITALS
RESPIRATION RATE: 16 BRPM | OXYGEN SATURATION: 96 % | TEMPERATURE: 98 F | SYSTOLIC BLOOD PRESSURE: 124 MMHG | WEIGHT: 171 LBS | BODY MASS INDEX: 25.91 KG/M2 | HEART RATE: 74 BPM | HEIGHT: 68 IN | DIASTOLIC BLOOD PRESSURE: 75 MMHG

## 2018-01-11 DIAGNOSIS — J45.30 MILD PERSISTENT ASTHMA WITHOUT COMPLICATION: ICD-10-CM

## 2018-01-11 DIAGNOSIS — N92.0 EXCESSIVE OR FREQUENT MENSTRUATION: ICD-10-CM

## 2018-01-11 DIAGNOSIS — Z13.6 ENCOUNTER FOR SCREENING FOR CARDIOVASCULAR DISORDERS: ICD-10-CM

## 2018-01-11 DIAGNOSIS — Z86.19 HISTORY OF LYME DISEASE: ICD-10-CM

## 2018-01-11 DIAGNOSIS — J30.2 SEASONAL ALLERGIC RHINITIS, UNSPECIFIED CHRONICITY, UNSPECIFIED TRIGGER: ICD-10-CM

## 2018-01-11 DIAGNOSIS — Z00.00 ROUTINE GENERAL MEDICAL EXAMINATION AT A HEALTH CARE FACILITY: Primary | ICD-10-CM

## 2018-01-11 DIAGNOSIS — E61.1 LOW IRON: ICD-10-CM

## 2018-01-11 PROBLEM — A69.20 LYME DISEASE: Status: RESOLVED | Noted: 2018-01-11 | Resolved: 2018-01-11

## 2018-01-11 PROBLEM — A69.20 LYME DISEASE: Status: ACTIVE | Noted: 2018-01-11

## 2018-01-11 LAB
IRON SATN MFR SERPL: 49 % (ref 15–46)
IRON SERPL-MCNC: 181 UG/DL (ref 35–180)
TIBC SERPL-MCNC: 372 UG/DL (ref 240–430)

## 2018-01-11 PROCEDURE — 83540 ASSAY OF IRON: CPT | Performed by: FAMILY MEDICINE

## 2018-01-11 PROCEDURE — 99396 PREV VISIT EST AGE 40-64: CPT | Performed by: FAMILY MEDICINE

## 2018-01-11 PROCEDURE — 83550 IRON BINDING TEST: CPT | Performed by: FAMILY MEDICINE

## 2018-01-11 PROCEDURE — 36415 COLL VENOUS BLD VENIPUNCTURE: CPT | Performed by: FAMILY MEDICINE

## 2018-01-11 PROCEDURE — 80053 COMPREHEN METABOLIC PANEL: CPT | Performed by: FAMILY MEDICINE

## 2018-01-11 PROCEDURE — 80061 LIPID PANEL: CPT | Performed by: FAMILY MEDICINE

## 2018-01-11 RX ORDER — LEVONORGESTREL AND ETHINYL ESTRADIOL 0.15-0.03
1 KIT ORAL DAILY
Qty: 84 TABLET | Refills: 2 | Status: SHIPPED | OUTPATIENT
Start: 2018-01-11 | End: 2018-05-18

## 2018-01-11 RX ORDER — ALBUTEROL SULFATE 90 UG/1
2 AEROSOL, METERED RESPIRATORY (INHALATION) EVERY 6 HOURS PRN
Qty: 1 INHALER | Refills: 0 | COMMUNITY
Start: 2018-01-11 | End: 2019-07-10

## 2018-01-11 RX ORDER — FLUTICASONE PROPIONATE AND SALMETEROL XINAFOATE 115; 21 UG/1; UG/1
2 AEROSOL, METERED RESPIRATORY (INHALATION) 2 TIMES DAILY
Qty: 36 G | Refills: 1 | Status: SHIPPED | OUTPATIENT
Start: 2018-01-11 | End: 2019-07-10

## 2018-01-11 NOTE — PROGRESS NOTES
SUBJECTIVE:   CC: Abigail Harvey is an 42 year old woman who presents for preventive health visit.     Healthy Habits:  Answers for HPI/ROS submitted by the patient on 1/11/2018   Annual Exam:  Getting at least 3 servings of Calcium per day:: Yes  Bi-annual eye exam:: Yes  Dental care twice a year:: NO  Sleep apnea or symptoms of sleep apnea:: None  Diet:: Carbohydrate counting  Frequency of exercise:: 4-5 days/week  Taking medications regularly:: Yes  Medication side effects:: None  Additional concerns today:: YES  PHQ-2 Score: 0  Duration of exercise:: 30-45 minutes    PROBLEMS TO ADD ON... Will like to know the transition from birthcontrol to menopause    Been 3 yrs since pap smear. After had babies periods were so heavy put on BCP  Been on it long time without any known side effects. Takes it through sugar pill week and all and has had no period in a long time. Has had Mild weight gain despite increased exercise thought cut back carb's and has not gained any more weight. Pap not due till 2020 but willing to get if BCP puts her at higher risk of cancers    Hx of Asthma , went to the asthma doctor a long time ago, got clear picture what she is allergic to and managing symptoms by trying to avoid/ minimize them as much as possible. Does live with a cat she is allergic to. Never did allergy shots as [place] told would be weekly for 3 yrs or more and didn't have time with young kids to do that. Wondering if anything closer she could go to. Asthma usually worse when congested from allergies. ACT only 18 today . Using Advair 45/21. Has not used albuterol in a while. On allegra unknown dose pretty much year round as found allergic to something in practically every season. Sometimes wakes up when sleeping gasping. Thought initially was anxiety but then just catching her breath relieves her of symptoms. Now wondering if its her allergies/ asthma    Exercising a lot 4  times a week. Trying to avoid bread and pasta.     Hx of low iron, willing to get labs today     Treated for lyme's in oct after having body aches and myriad symptoms since aug. Elevated liver tests came back down to normal         Today's PHQ-2 Score:   PHQ-2 (  [employer]) 2018 10/9/2017   Q1: Little interest or pleasure in doing things 0 0   Q2: Feeling down, depressed or hopeless 0 0   PHQ-2 Score 0 0   Q1: Little interest or pleasure in doing things Not at all -   Q2: Feeling down, depressed or hopeless Not at all -   PHQ-2 Score 0 -     Abuse: Current or Past(Physical, Sexual or Emotional)- No  Do you feel safe in your environment - Yes    Social History   Substance Use Topics     Smoking status: Never Smoker     Smokeless tobacco: Never Used     Alcohol use Yes     If you drink alcohol do you typically have >3 drinks per day or >7 drinks per week? No                     Reviewed orders with patient.  Reviewed health maintenance and updated orders accordingly - Yes  Labs reviewed in EPIC  BP Readings from Last 3 Encounters:   18 124/75   10/09/17 128/83   17 115/73    Wt Readings from Last 3 Encounters:   18 171 lb (77.6 kg)   10/09/17 167 lb (75.8 kg)   17 170 lb (77.1 kg)                  Patient Active Problem List   Diagnosis     Chondromalacia of patella     Excessive or frequent menstruation     Mild persistent asthma without complication     BMI 26.0-26.9,adult     Past Surgical History:   Procedure Laterality Date      SECTION      x2     HC TOOTH EXTRACTION W/FORCEP         Social History   Substance Use Topics     Smoking status: Never Smoker     Smokeless tobacco: Never Used     Alcohol use Yes      Comment: occasional     Family History   Problem Relation Age of Onset     HEART DISEASE Father      heart attack     CANCER Father      lung     Hypertension Mother          Current Outpatient Prescriptions   Medication Sig Dispense Refill     albuterol (PROAIR  HFA/PROVENTIL HFA/VENTOLIN HFA) 108 (90 BASE) MCG/ACT Inhaler Inhale 2 puffs into the lungs every 6 hours as needed for shortness of breath / dyspnea or wheezing 1 Inhaler 0     Fexofenadine HCl (ALLEGRA PO)        levonorgestrel-ethinyl estradiol (JOLESSA) 0.15-0.03 MG per tablet Take 1 tablet by mouth daily 84 tablet 2     fluticasone-salmeterol (ADVAIR-HFA) 115-21 MCG/ACT Inhaler Inhale 2 puffs into the lungs 2 times daily 36 g 1     [DISCONTINUED] ADVAIR HFA 45-21 MCG/ACT inhaler INHALE 2 PUFFS INTO THE LUNGS 2 TIMES DAILY 12 g 3     [DISCONTINUED] levonorgestrel-ethinyl estradiol (JOLESSA) 0.15-0.03 MG per tablet Take 1 tablet by mouth daily 84 tablet 2     No Known Allergies  Recent Labs   Lab Test  10/27/17   0954  10/13/17   1203  10/09/17   1700   ALT  41  72*  111*   CR   --    --   0.69   GFRESTIMATED   --    --   >90   GFRESTBLACK   --    --   >90   POTASSIUM   --    --   4.0   TSH   --    --   1.22              Patient under age 50, mutual decision reflected in health maintenance.        Pertinent mammograms are reviewed under the imaging tab.  History of abnormal Pap smear:   NO - age 30-65 PAP every 5 years with negative HPV co-testing recommended  Last 3 Pap Results:   PAP (no units)   Date Value   2015 NIL       Reviewed and updated as needed this visit by clinical staffTobacco  Allergies  Meds  Med Hx  Surg Hx  Fam Hx  Soc Hx        Reviewed and updated as needed this visit by Provider        Past Medical History:   Diagnosis Date     Injury of left thumb, initial encounter 2017     Lyme disease 2018     Thumb pain, left 3/23/2017      Past Surgical History:   Procedure Laterality Date      SECTION      x2     HC TOOTH EXTRACTION W/FORCEP       Obstetric History       T2      L2     SAB1   TAB0   Ectopic0   Multiple0   Live Births2       # Outcome Date GA Lbr Jos/2nd Weight Sex Delivery Anes PTL Lv   3 SAB            2 Term      CS-LTranv   MADELIN   1 Term    "   CS-LTranv   MADELIN          ROS:  C: NEGATIVE for fever, chills, change in weight  I: NEGATIVE for worrisome rashes, moles or lesions  E: NEGATIVE for vision changes or irritation  ENT: NEGATIVE for ear, mouth and throat problems  R: NEGATIVE for significant cough or SOB  B: NEGATIVE for masses, tenderness or discharge  CV: NEGATIVE for chest pain, palpitations or peripheral edema  GI: NEGATIVE for nausea, abdominal pain, heartburn, or change in bowel habits  : NEGATIVE for unusual urinary or vaginal symptoms. Periods are regular.  M: NEGATIVE for significant arthralgias or myalgia  N: NEGATIVE for weakness, dizziness or paresthesias  E: NEGATIVE for temperature intolerance, skin/hair changes  H: NEGATIVE for bleeding problems  P: NEGATIVE for changes in mood or affect    OBJECTIVE:   /75 (BP Location: Right arm, Patient Position: Chair, Cuff Size: Adult Regular)  Pulse 74  Temp 98  F (36.7  C) (Oral)  Resp 16  Ht 5' 8\" (1.727 m)  Wt 171 lb (77.6 kg)  SpO2 96%  BMI 26 kg/m2  EXAM:  GENERAL: healthy, alert and no distress  EYES: Eyes grossly normal to inspection, PERRL and conjunctivae and sclerae normal  HENT: ear canals and TM's normal, nose and mouth without ulcers or lesions  NECK: no adenopathy, no asymmetry, masses, or scars and thyroid normal to palpation  RESP: lungs clear to auscultation - no rales, rhonchi or wheezes  BREAST: normal without masses, tenderness or nipple discharge and no palpable axillary masses or adenopathy  CV: regular rate and rhythm, normal S1 S2, no S3 or S4, no murmur, click or rub, no peripheral edema and peripheral pulses strong  ABDOMEN: soft, non tender, no hepatosplenomegaly, no masses and bowel sounds normal  MS: no gross musculoskeletal defects noted, no edema  SKIN: no suspicious lesions or rashes  NEURO: Normal strength and tone, mentation intact and speech normal  PSYCH: mentation appears normal, affect normal/bright  LYMPH: no cervical, supraclavicular or " axillary adenopathy    ASSESSMENT/PLAN:   Hx of mild persistent asthma, menorrhagia on BCP S, Left thumb injury 2/2017 S/P hand therapy, chondromal patella, prior C /sections x2, tooth extraction, seen 5/29/17 for sore throat, strep negative. Seen 10/9 for body aches, fatigue, sore throat, abdominal pain, asthma exacerbation, going on since august with suggestion of tonsillitis, given Amoxil, Advair refilled, cbc, B 12, vit d, BMP, TSH were normal, LFT S were elevated, had some iron deficiency, tested positive for lyme's treated for doxycycline 28 days. Hep A, B & C negative and immune to hep A & B. on 10/27 lfts back to normal and ferritin normal, 12/30 ACT noted as 21. u/S abdomen never done. [place]  negative. Here for a physical  1. Routine general medical examination at a health care facility  breast exam monthly. mammogram  Ordered. pelvic/pap due 2020. Doing it earlier will not change risk of breast or ovarian or endometrial cancer rates  potentially by birth control use. Currently asymptomatic . Takes BCP for heavy periods that were causing her anemia in the past. counseled risk of blood clot, stroke, endometrial and breast cancer increase with age mor js 34, smoking, obesity and family history . She seems low risk.work on weight. decided to continue to take till age 44 then see gyn for options. Labs today. Asthma action plan given. Increase Advair to 115 /21 2 puffs twice a day as not controlled. Albuterol as needed. Continue allergy pill. Referral to allergist given.  - Lipid panel reflex to direct LDL Fasting  - MA Screening Digital Bilateral; Future    2. Seasonal allergic rhinitis, unspecified chronicity, unspecified trigger  On allegra, told has a lot of allergies, affects her asthma, see allergy for further recommendations. interested in immunotherapy if close to home.  - ALLERGY/ASTHMA ADULT REFERRAL    3. Mild persistent asthma without complication  Not goal.increase Advair, use albuterol  "prn , see allergist   - albuterol (PROAIR HFA/PROVENTIL HFA/VENTOLIN HFA) 108 (90 BASE) MCG/ACT Inhaler; Inhale 2 puffs into the lungs every 6 hours as needed for shortness of breath / dyspnea or wheezing  Dispense: 1 Inhaler; Refill: 0  - fluticasone-salmeterol (ADVAIR-HFA) 115-21 MCG/ACT Inhaler; Inhale 2 puffs into the lungs 2 times daily  Dispense: 36 G; Refill: 1  - ALLERGY/ASTHMA ADULT REFERRAL    4. Low iron  recheck today   - Iron and iron binding capacity    5. Excessive or frequent menstruation  Asymptomatic on BCP , continue same.   - Iron and iron binding capacity  - levonorgestrel-ethinyl estradiol (JOLESSA) 0.15-0.03 MG per tablet; Take 1 tablet by mouth daily  Dispense: 84 tablet; Refill: 2    6. History of Lyme disease  Treated in oct / nov 2017 and currently asymptomatic   - Comprehensive metabolic panel    7. BMI 26.0-26.9,adult  Diet, exercise, weight loss    8. Encounter for screening for cardiovascular disorders  - Lipid panel reflex to direct LDL Fasting    COUNSELING:   Reviewed preventive health counseling, as reflected in patient instructions       Regular exercise       Healthy diet/nutrition       Vision screening       Contraception       Osteoporosis Prevention/Bone Health         reports that she has never smoked. She has never used smokeless tobacco.    Estimated body mass index is 26 kg/(m^2) as calculated from the following:    Height as of this encounter: 5' 8\" (1.727 m).    Weight as of this encounter: 171 lb (77.6 kg).   Weight management plan: Discussed healthy diet and exercise guidelines and patient will follow up in 12 months in clinic to re-evaluate.    Counseling Resources:  ATP IV Guidelines  Pooled Cohorts Equation Calculator  Breast Cancer Risk Calculator  FRAX Risk Assessment  ICSI Preventive Guidelines  Dietary Guidelines for Americans, 2010  USDA's MyPlate  ASA Prophylaxis  Lung CA Screening    Beryl Patel MD  [facility]  "

## 2018-01-11 NOTE — LETTER
January 12, 2018      Abigail Harvey  [address]  [facility] [address]        Dear ,    We are writing to inform you of your test results.    More than adequate iron levels    Resulted Orders   Iron and iron binding capacity   Result Value Ref Range    Iron 181 (H) 35 - 180 ug/dL    Iron Binding Cap 372 240 - 430 ug/dL    Iron Saturation Index 49 (H) 15 - 46 %       If you have any questions or concerns, please call the clinic at the number listed above.       Sincerely,        Beryl Patel MD/nr

## 2018-01-11 NOTE — LETTER
My Asthma Action Plan  Name: Abigail Harvey   YOB: 1975  Date: 1/11/2018   My doctor: Beryl Patel MD   My clinic: [facility]        My Control Medicine: Fluticasone + salmeterol (Advair) -  /21 mcg 1 puff twice a day   My Rescue Medicine: Albuterol (Proair/Ventolin/Proventil) inhaler 2 puffs every 6 hrs as needed    My Asthma Severity: mild persistent  Avoid your asthma triggers:                GREEN ZONE   Good Control    I feel good    No cough or wheeze    Can work, sleep and play without asthma symptoms       Take your asthma control medicine every day.     1. If exercise triggers your asthma, take your rescue medication    15 minutes before exercise or sports, and    During exercise if you have asthma symptoms  2. Spacer to use with inhaler: If you have a spacer, make sure to use it with your inhaler             YELLOW ZONE Getting Worse  I have ANY of these:    I do not feel good    Cough or wheeze    Chest feels tight    Wake up at night   1. Keep taking your Green Zone medications  2. Start taking your rescue medicine:    every 20 minutes for up to 1 hour. Then every 4 hours for 24-48 hours.  3. If you stay in the Yellow Zone for more than 12-24 hours, contact your doctor.  4. If you do not return to the Green Zone in 12-24 hours or you get worse, start taking your oral steroid medicine if prescribed by your provider.           RED ZONE Medical Alert - Get Help  I have ANY of these:    I feel awful    Medicine is not helping    Breathing getting harder    Trouble walking or talking    Nose opens wide to breathe       1. Take your rescue medicine NOW  2. If your provider has prescribed an oral steroid medicine, start taking it NOW  3. Call your doctor NOW  4. If you are still in the Red Zone after 20 minutes and you have not reached your doctor:    Take your rescue medicine again and    Call 911 or go to the emergency room right away    See your regular doctor within 2  weeks of an Emergency Room or Urgent Care visit for follow-up treatment.        Electronically signed by: Beryl Patel, January 11, 2018    Annual Reminders:  Meet with Asthma Educator,  Flu Shot in the Fall, consider Pneumonia Vaccination for patients with asthma (aged 19 and older).    Pharmacy:    [id number] - [place], [place] - [address] [id number] - [place], [place] - [address] AT [facility] [address] [facility]                    Asthma Triggers  How To Control Things That Make Your Asthma Worse    Triggers are things that make your asthma worse.  Look at the list below to help you find your triggers and what you can do about them.  You can help prevent asthma flare-ups by staying away from your triggers.      Trigger                                                          What you can do   Cigarette Smoke  Tobacco smoke can make asthma worse. Do not allow smoking in your home, car or around you.  Be sure no one smokes at a child s day care or school.  If you smoke, ask your health care provider for ways to help you quit.  Ask family members to quit too.  Ask your health care provider for a referral to Quit Plan to help you quit smoking, or call 6-739-813-PLAN.     Colds, Flu, Bronchitis  These are common triggers of asthma. Wash your hands often.  Don t touch your eyes, nose or mouth.  Get a flu shot every year.     Dust Mites  These are tiny bugs that live in cloth or carpet. They are too small to see. Wash sheets and blankets in hot water every week.   Encase pillows and mattress in dust mite proof covers.  Avoid having carpet if you can. If you have carpet, vacuum weekly.   Use a dust mask and HEPA vacuum.   Pollen and Outdoor Mold  Some people are allergic to trees, grass, or weed pollen, or molds. Try to keep your windows closed.  Limit time out doors when pollen count is high.   Ask you health care provider about taking medicine during allergy season.      Animal Dander  Some people are allergic to skin flakes, urine or saliva from pets with fur or feathers. Keep pets with fur or feathers out of your home.    If you can t keep the pet outdoors, then keep the pet out of your bedroom.  Keep the bedroom door closed.  Keep pets off cloth furniture and away from stuffed toys.     Mice, Rats, and Cockroaches  Some people are allergic to the waste from these pests.   Cover food and garbage.  Clean up spills and food crumbs.  Store grease in the refrigerator.   Keep food out of the bedroom.   Indoor Mold  This can be a trigger if your home has high moisture. Fix leaking faucets, pipes, or other sources of water.   Clean moldy surfaces.  Dehumidify basement if it is damp and smelly.   Smoke, Strong Odors, and Sprays  These can reduce air quality. Stay away from strong odors and sprays, such as perfume, powder, hair spray, paints, smoke incense, paint, cleaning products, candles and new carpet.   Exercise or Sports  Some people with asthma have this trigger. Be active!  Ask your doctor about taking medicine before sports or exercise to prevent symptoms.    Warm up for 5-10 minutes before and after sports or exercise.     Other Triggers of Asthma  Cold air:  Cover your nose and mouth with a scarf.  Sometimes laughing or crying can be a trigger.  Some medicines and food can trigger asthma.

## 2018-01-11 NOTE — LETTER
January 15, 2018      Abigail FABRICIO Wes  [address]  [facility] [address]        Dear ,    We are writing to inform you of your test results.    Results within acceptable limits.  -Liver and gallbladder tests are normal. (ALT,AST, Alk phos, bilirubin), kidney function is normal (Cr, GFR), Sodium is normal, Potassium is normal, Calcium is normal, Glucose is normal (diabetes screening test).   -Cholesterol levels (LDL,HDL, Triglycerides) are normal  For non fasting sample.  ADVISE: rechecking in 1 year..    Resulted Orders   Lipid panel reflex to direct LDL Fasting   Result Value Ref Range    Cholesterol 188 <200 mg/dL    Triglycerides 152 (H) <150 mg/dL      Comment:      Borderline high:  150-199 mg/dl  High:             200-499 mg/dl  Very high:       >499 mg/dl  Non Fasting      HDL Cholesterol 57 >49 mg/dL    LDL Cholesterol Calculated 101 (H) <100 mg/dL      Comment:      Above desirable:  100-129 mg/dl  Borderline High:  130-159 mg/dL  High:             160-189 mg/dL  Very high:       >189 mg/dl      Non HDL Cholesterol 131 (H) <130 mg/dL      Comment:      Above Desirable:  130-159 mg/dl  Borderline high:  160-189 mg/dl  High:             190-219 mg/dl  Very high:       >219 mg/dl     Comprehensive metabolic panel   Result Value Ref Range    Sodium 137 133 - 144 mmol/L    Potassium 4.3 3.4 - 5.3 mmol/L    Chloride 102 94 - 109 mmol/L    Carbon Dioxide 26 20 - 32 mmol/L    Anion Gap 9 3 - 14 mmol/L    Glucose 90 70 - 99 mg/dL      Comment:      Non Fasting    Urea Nitrogen 16 7 - 30 mg/dL    Creatinine 0.78 0.52 - 1.04 mg/dL    GFR Estimate 80 >60 mL/min/1.7m2      Comment:      Non  GFR Calc    GFR Estimate If Black >90 >60 mL/min/1.7m2      Comment:       GFR Calc    Calcium 9.1 8.5 - 10.1 mg/dL    Bilirubin Total 0.5 0.2 - 1.3 mg/dL    Albumin 4.0 3.4 - 5.0 g/dL    Protein Total 7.4 6.8 - 8.8 g/dL    Alkaline Phosphatase 62 40 - 150 U/L    ALT 36 0 - 50 U/L    AST  24 0 - 45 U/L   Iron and iron binding capacity   Result Value Ref Range    Iron 181 (H) 35 - 180 ug/dL    Iron Binding Cap 372 240 - 430 ug/dL    Iron Saturation Index 49 (H) 15 - 46 %       If you have any questions or concerns, please call the clinic at the number listed above.       Sincerely,        Beryl Patel MD/nr

## 2018-01-11 NOTE — MR AVS SNAPSHOT
After Visit Summary   1/11/2018    Abigail Harvey    MRN: [id number]           Patient Information     Date Of Birth          1975        Visit Information        Provider Department      1/11/2018 9:20 AM Beryl Patel MD [facility]        Today's Diagnoses     Routine general medical examination at a health care facility    -  1    Mild persistent asthma without complication        Low iron        Excessive or frequent menstruation        Encounter for screening for cardiovascular disorders        History of Lyme disease        BMI 26.0-26.9,adult        Mild persistent asthma with acute exacerbation        Seasonal allergic rhinitis, unspecified chronicity, unspecified trigger          Care Instructions    breast exam monthly  mammogram   pelvic/pap due 2020  Labs today   Asthma action plan given  Increase advair to 115 /21 2 puffs twice a day   Albuterol as needed  Continue allergy pill  Continue birth control for now  See gyn age 44 about other choices  Contraceptive methods were discussed in detail:    Birth control pills are a medication you take every day to prevent pregnancy. If birth control pills are always used correctly, less than 1 out of 100 women using them will get pregnant each year. When you first start the pill, it takes several days to begin working. Be sure to use backup birth control (like a condom) for the first 7 days preferably till the first packet is completed.  The hormones in the pill keep your ovaries from releasing eggs and thicken your cervical mucus to block sperm from getting into the uterus.  Most women can get pregnant quickly when they stop using the pill.  Your periods may become lighter and less painful if you take the pill.  The hormones in pills offer health benefits. The pill can offer some protection against acne, non-cancerous breast growths, ectopic pregnancy, endometrial and ovarian cancers, iron deficiency anemia, and ovarian  cysts.  Birth control pills do not protect against sexually transmitted infections (STIs). Some women may have side effects while using birth control pills. They include bleeding between periods, breast tenderness, and nausea. Some of the most common side effects only last for the first few months.   Risks discussed including risk for heart attack, stroke and blood clots. Regular condom use is recommended to help protect against STIs.    Can see allergist see numbers below       Preventive Health Recommendations  Female Ages 40 to 49    Yearly exam:     See your health care provider every year in order to  1. Review health changes.   2. Discuss preventive care.    3. Review your medicines if your doctor prescribed any.      Get a Pap test every three years (unless you have an abnormal result and your provider advises testing more often).      If you get Pap tests with HPV test, you only need to test every 5 years, unless you have an abnormal result. You do not need a Pap test if your uterus was removed (hysterectomy) and you have not had cancer.      You should be tested each year for STDs (sexually transmitted diseases), if you're at risk.       Ask your doctor if you should have a mammogram.      Have a colonoscopy (test for colon cancer) if someone in your family has had colon cancer or polyps before age 50.       Have a cholesterol test every 5 years.       Have a diabetes test (fasting glucose) after age 45. If you are at risk for diabetes, you should have this test every 3 years.    Shots: Get a flu shot each year. Get a tetanus shot every 10 years.     Nutrition:     Eat at least 5 servings of fruits and vegetables each day.    Eat whole-grain bread, whole-wheat pasta and brown rice instead of white grains and rice.    Talk to your provider about Calcium and Vitamin D.     Lifestyle    Exercise at least 150 minutes a week (an average of 30 minutes a day, 5 days a week). This will help you control your weight  "and prevent disease.    Limit alcohol to one drink per day.    No smoking.     Wear sunscreen to prevent skin cancer.    See your dentist every six months for an exam and cleaning.    Recommended low salt diet, wt loss, exercise.   Eating a healthy diet can improve your health by reducing your risk for heart and vascular disease.  It can help you maintain a healthy weight which in turn decreases your risk for additional problems including diabetes and arthritis.  Eating well can improve your concentration and memory.  You'll also feel better.  Follow the advice of author Parviz Morley (In Defense of Food): \"Eat food.  Not too much.  Mostly plants.\"  Or follow the advice:  \"If it came from a plant, eat it.  If it was processed in a plant, don't.\"    A heart-healthy, Mediterranean Diet is low in saturated fat and includes the following:  Fruits and vegetables (fresh or frozen - especially berries and cruciferous vegetables)  Whole grains and legumes (whole grain bread, whole grain pasta, whole grain cereal, and brown rice)  Healthy fats:  Olive oil and Canola oil  Nuts (especially walnuts and almonds)  Fish  Avocado  Soy  Garlic  Dark chocolate    Foods to limit or avoid include:  Animal products and animal fats  Saturated fat (especially fried foods and trans fats)  Refined carbohydrates (white flour, white bread, white pasta, sugar, and white rice)  Red meat  Processed meat  Processed food including fast food    STEFFEN SIGNUP FOR E-VISITS AND EASIER COMMUNICATION:  [email or web address]     Zipnosis:  [place].Vascular Pathways.Win Win Slots.  Sign up for e-visits for common illnesses!     RADIOLOGY:   [facility]:  [phone] to schedule any radiology tests at [facility]: [phone]    Mammograms/colonoscopies:  [phone]    CONSUMER PRICE LINE for estimates of test costs:  [phone]     You can also call [phone] if you are uninsured or underinsured to see if you qualify for a reduced " cost mammogram or colonoscopy.     Please consider using [facility] for better service and improved communication with your physician!            Follow-ups after your visit        Additional Services     ALLERGY/ASTHMA ADULT REFERRAL       Your provider has referred you to: [facility]Asthma-[facility] - [phone]  [email or web address]  N: [facility] [phone]   [email or web address]    Please be aware that coverage of these services is subject to the terms and limitations of your health insurance plan.  Call member services at your health plan with any benefit or coverage questions.      Please bring the following with you to your appointment:    (1) Any X-Rays, CTs or MRIs which have been performed.  Contact the facility where they were done to arrange for  prior to your scheduled appointment.    (2) List of current medications  (3) This referral request   (4) Any documents/labs given to you for this referral                  Future tests that were ordered for you today     Open Future Orders        Priority Expected Expires Ordered    MA Screening Digital Bilateral Routine  1/12/2019 1/11/2018            Who to contact     If you have questions or need follow up information about today's clinic visit or your schedule please contact [facility] directly at [phone].  Normal or non-critical lab and imaging results will be communicated to you by MyChart, letter or phone within 4 business days after the clinic has received the results. If you do not hear from us within 7 days, please contact the clinic through MyChart or phone. If you have a critical or abnormal lab result, we will notify you by phone as soon as possible.  Submit refill requests through [employer]oter[employer] or call your pharmacy and they will forward the refill request to us. Please allow 3 business days for your refill  "to be completed.          Additional Information About Your Visit        [email or web address]. Information     [email or web address] lets you send messages to your doctor, view your test results, renew your prescriptions, schedule appointments and more. To sign up, go to [email or web address] . Click on \"Log in\" on the left side of the screen, which will take you to the Welcome page. Then click on \"Sign up Now\" on the right side of the page.     You will be asked to enter the access code listed below, as well as some personal information. Please follow the directions to create your username and password.     Your access code is: [email or web address]  Expires: 2018  9:49 AM     Your access code will  in 90 days. If you need help or a new code, please call your [place] clinic or [phone].        Care EveryWhere ID     This is your Care EveryWhere ID. This could be used by other organizations to access your [place] medical records  [id number]        Your Vitals Were     Pulse Temperature Respirations Height Pulse Oximetry BMI (Body Mass Index)    74 98  F (36.7  C) (Oral) 16 5' 8\" (1.727 m) 96% 26 kg/m2       Blood Pressure from Last 3 Encounters:   18 124/75   10/09/17 128/83   17 115/73    Weight from Last 3 Encounters:   18 171 lb (77.6 kg)   10/09/17 167 lb (75.8 kg)   17 170 lb (77.1 kg)              We Performed the Following     ALLERGY/ASTHMA ADULT REFERRAL     Comprehensive metabolic panel     Iron and iron binding capacity     Lipid panel reflex to direct LDL Fasting          Today's Medication Changes          These changes are accurate as of: 18  9:49 AM.  If you have any questions, ask your nurse or doctor.               Start taking these medicines.        Dose/Directions    fluticasone-salmeterol 115-21 MCG/ACT Inhaler   Commonly known as:  ADVAIR-HFA   Used for:  Mild persistent asthma without complication   Replaces:  ADVAIR HFA 45-21 MCG/ACT inhaler   Started by:  Beryl Patel MD        " Dose:  2 puff   Inhale 2 puffs into the lungs 2 times daily   Quantity:  36 g   Refills:  1         Stop taking these medicines if you haven't already. Please contact your care team if you have questions.     ADVAIR HFA 45-21 MCG/ACT inhaler   Generic drug:  fluticasone-salmeterol   Replaced by:  fluticasone-salmeterol 115-21 MCG/ACT Inhaler   Stopped by:  Beryl Patel MD                Where to get your medicines      These medications were sent to [facility] [address] [place] [facility] [place], [place] - [address]  [address] [address]     Phone:  [phone]     fluticasone-salmeterol 115-21 MCG/ACT Inhaler    levonorgestrel-ethinyl estradiol 0.15-0.03 MG per tablet                Primary Care Provider Office Phone # Fax #    Deqa Isaura Packer [phone]       [address]  [facility] [address]        Equal Access to Services     [facility]: Hadii aad ku hadasho Soomaali, waaxda luqadaha, qaybta kaalmada adeegyada, waxay idiin hayerlinn deandre mcgraw . So [facility] [phone].    ATENCIÓN: Si habla español, tiene a byrd disposición servicios gratuitos de asistencia lingüística. [facility] [phone].    We comply with applicable federal civil rights laws and [place] laws. We do not discriminate on the basis of race, color, national origin, age, disability, sex, sexual orientation, or gender identity.            Thank you!     Thank you for choosing [facility]  for your care. Our goal is always to provide you with excellent care. Hearing back from our patients is one way we can continue to improve our services. Please take a few minutes to complete the written survey that you may receive in the mail after your visit with us. Thank you!             Your Updated Medication List - Protect others around you: Learn how to safely use, store and throw away your medicines at www.disposemymeds.org.          This list is accurate as of: 1/11/18  9:49 AM.  Always use  your most recent med list.                   Brand Name Dispense Instructions for use Diagnosis    albuterol 108 (90 BASE) MCG/ACT Inhaler    PROAIR HFA/PROVENTIL HFA/VENTOLIN HFA    1 Inhaler    Inhale 2 puffs into the lungs every 6 hours as needed for shortness of breath / dyspnea or wheezing    Mild persistent asthma without complication       ALLEGRA PO           fluticasone-salmeterol 115-21 MCG/ACT Inhaler    ADVAIR-HFA    36 g    Inhale 2 puffs into the lungs 2 times daily    Mild persistent asthma without complication       levonorgestrel-ethinyl estradiol 0.15-0.03 MG per tablet    JOLESSA    84 tablet    Take 1 tablet by mouth daily    Excessive or frequent menstruation

## 2018-01-11 NOTE — PATIENT INSTRUCTIONS
breast exam monthly  mammogram   pelvic/pap due 2020  Labs today   Asthma action plan given  Increase advair to 115 /21 2 puffs twice a day   Albuterol as needed  Continue allergy pill  Continue birth control for now  See gyn age 44 about other choices  Birth control pills are a medication you take every day to prevent pregnancy. If birth control pills are always used correctly, less than 1 out of 100 women using them will get pregnant each year. When you first start the pill, it takes several days to begin working. Be sure to use backup birth control (like a condom) for the first 7 days preferably till the first packet is completed.  The hormones in the pill keep your ovaries from releasing eggs and thicken your cervical mucus to block sperm from getting into the uterus.  Most women can get pregnant quickly when they stop using the pill.  Your periods may become lighter and less painful if you take the pill.  The hormones in pills offer health benefits. The pill can offer some protection against acne, non-cancerous breast growths, ectopic pregnancy, endometrial and ovarian cancers, iron deficiency anemia, and ovarian cysts.  Birth control pills do not protect against sexually transmitted infections (STIs). Some women may have side effects while using birth control pills. They include bleeding between periods, breast tenderness, and nausea. Some of the most common side effects only last for the first few months.   Risks discussed including risk for heart attack, stroke and blood clots. Regular condom use is recommended to help protect against STIs.    Can see allergist see numbers below       Preventive Health Recommendations  Female Ages 40 to 49    Yearly exam:     See your health care provider every year in order to  1. Review health changes.   2. Discuss preventive care.    3. Review your medicines if your doctor prescribed any.      Get a Pap test every three years (unless you have an abnormal result and your  "provider advises testing more often).      If you get Pap tests with HPV test, you only need to test every 5 years, unless you have an abnormal result. You do not need a Pap test if your uterus was removed (hysterectomy) and you have not had cancer.      You should be tested each year for STDs (sexually transmitted diseases), if you're at risk.       Ask your doctor if you should have a mammogram.      Have a colonoscopy (test for colon cancer) if someone in your family has had colon cancer or polyps before age 50.       Have a cholesterol test every 5 years.       Have a diabetes test (fasting glucose) after age 45. If you are at risk for diabetes, you should have this test every 3 years.    Shots: Get a flu shot each year. Get a tetanus shot every 10 years.     Nutrition:     Eat at least 5 servings of fruits and vegetables each day.    Eat whole-grain bread, whole-wheat pasta and brown rice instead of white grains and rice.    Talk to your provider about Calcium and Vitamin D.     Lifestyle    Exercise at least 150 minutes a week (an average of 30 minutes a day, 5 days a week). This will help you control your weight and prevent disease.    Limit alcohol to one drink per day.    No smoking.     Wear sunscreen to prevent skin cancer.    See your dentist every six months for an exam and cleaning.    Recommended low salt diet, wt loss, exercise.   Eating a healthy diet can improve your health by reducing your risk for heart and vascular disease.  It can help you maintain a healthy weight which in turn decreases your risk for additional problems including diabetes and arthritis.  Eating well can improve your concentration and memory.  You'll also feel better.  Follow the advice of author Parviz Morley (In Defense of Food): \"Eat food.  Not too much.  Mostly plants.\"  Or follow the advice:  \"If it came from a plant, eat it.  If it was processed in a plant, don't.\"    A heart-healthy, Mediterranean Diet is low in " saturated fat and includes the following:  Fruits and vegetables (fresh or frozen - especially berries and cruciferous vegetables)  Whole grains and legumes (whole grain bread, whole grain pasta, whole grain cereal, and brown rice)  Healthy fats:  Olive oil and Canola oil  Nuts (especially walnuts and almonds)  Fish  Avocado  Soy  Garlic  Dark chocolate    Foods to limit or avoid include:  Animal products and animal fats  Saturated fat (especially fried foods and trans fats)  Refined carbohydrates (white flour, white bread, white pasta, sugar, and white rice)  Red meat  Processed meat  Processed food including fast food    MYCHART SIGNUP FOR E-VISITS AND EASIER COMMUNICATION:  [email or web address]     Zipnosis:  [place].Creator Up.  Sign up for e-visits for common illnesses!     RADIOLOGY:   [facility]:  [phone] to schedule any radiology tests at [facility]: [phone]    Mammograms/colonoscopies:  [phone]    CONSUMER PRICE LINE for estimates of test costs:  [phone]     You can also call [phone] if you are uninsured or underinsured to see if you qualify for a reduced cost mammogram or colonoscopy.     Please consider using [facility] for better service and improved communication with your physician!

## 2018-01-12 LAB
ALBUMIN SERPL-MCNC: 4 G/DL (ref 3.4–5)
ALP SERPL-CCNC: 62 U/L (ref 40–150)
ALT SERPL W P-5'-P-CCNC: 36 U/L (ref 0–50)
ANION GAP SERPL CALCULATED.3IONS-SCNC: 9 MMOL/L (ref 3–14)
AST SERPL W P-5'-P-CCNC: 24 U/L (ref 0–45)
BILIRUB SERPL-MCNC: 0.5 MG/DL (ref 0.2–1.3)
BUN SERPL-MCNC: 16 MG/DL (ref 7–30)
CALCIUM SERPL-MCNC: 9.1 MG/DL (ref 8.5–10.1)
CHLORIDE SERPL-SCNC: 102 MMOL/L (ref 94–109)
CHOLEST SERPL-MCNC: 188 MG/DL
CO2 SERPL-SCNC: 26 MMOL/L (ref 20–32)
CREAT SERPL-MCNC: 0.78 MG/DL (ref 0.52–1.04)
GFR SERPL CREATININE-BSD FRML MDRD: 80 ML/MIN/1.7M2
GLUCOSE SERPL-MCNC: 90 MG/DL (ref 70–99)
HDLC SERPL-MCNC: 57 MG/DL
LDLC SERPL CALC-MCNC: 101 MG/DL
NONHDLC SERPL-MCNC: 131 MG/DL
POTASSIUM SERPL-SCNC: 4.3 MMOL/L (ref 3.4–5.3)
PROT SERPL-MCNC: 7.4 G/DL (ref 6.8–8.8)
SODIUM SERPL-SCNC: 137 MMOL/L (ref 133–144)
TRIGL SERPL-MCNC: 152 MG/DL

## 2018-01-12 ASSESSMENT — ASTHMA QUESTIONNAIRES: ACT_TOTALSCORE: 18

## 2018-01-12 NOTE — PROGRESS NOTES
Results within acceptable limits.  -Liver and gallbladder tests are normal. (ALT,AST, Alk phos, bilirubin), kidney function is normal (Cr, GFR), Sodium is normal, Potassium is normal, Calcium is normal, Glucose is normal (diabetes screening test).   -Cholesterol levels (LDL,HDL, Triglycerides) are normal  For non fasting sample.  ADVISE: rechecking in 1 year..

## 2018-02-04 DIAGNOSIS — N92.0 EXCESSIVE OR FREQUENT MENSTRUATION: ICD-10-CM

## 2018-02-05 NOTE — TELEPHONE ENCOUNTER
"Requested Prescriptions   Pending Prescriptions Disp Refills     levonorgestrel-ethinyl estradiol (SEASONALE) 0.15-0.03 MG per tablet [Pharmacy Med Name: LEVONOR-ETH ESTRAD 0.15-0.03]  Last Written Prescription Date:  1/11/2018  Last Fill Quantity: 84 tablet,  # refills: 2   Last Office Visit: 1/11/2018   Future Office Visit:      91 tablet 2     Sig: TAKE 1 TABLET BY MOUTH DAILY    Contraceptives Protocol Passed    2/4/2018  1:06 AM       Passed - Patient is not a current smoker if age is 35 or older       Passed - Recent or future visit with authorizing provider's specialty    Patient had office visit in the last year or has a visit in the next 30 days with authorizing provider.  See \"Patient Info\" tab in inbasket, or \"Choose Columns\" in Meds & Orders section of the refill encounter.          Passed - No active pregnancy on record       Passed - No positive pregnancy test in past 12 months          "

## 2018-02-09 RX ORDER — LEVONORGESTREL AND ETHINYL ESTRADIOL 0.15-0.03
KIT ORAL
Qty: 91 TABLET | Refills: 2 | Status: SHIPPED | OUTPATIENT
Start: 2018-02-09 | End: 2018-10-15

## 2018-04-20 ENCOUNTER — TELEPHONE (OUTPATIENT)
Dept: FAMILY MEDICINE | Facility: CLINIC | Age: 43
End: 2018-04-20

## 2018-04-20 NOTE — TELEPHONE ENCOUNTER
Patient calling to get a script for cold sores.  She uses [facility] in [place] [facility] [address].  Please call.  OK to leave message on voicemail.

## 2018-04-20 NOTE — TELEPHONE ENCOUNTER
Last office visit 1/11/2018 - patient has never been prescribed an antiviral medication for oral herpes - no lab culture    Creatinine   Date Value Ref Range Status   01/11/2018 0.78 0.52 - 1.04 mg/dL Final       Called patient - she states she currently has a breakout of what she believes to be oral herpes - states she spoke with Dr. Patel in the fall about a prescription but was not able to have one written due to other medical reasons/treatment at that time    Discussed with patient an office visit is needed for new prescriptions, symptoms, or if patient's have not been seen in the last two weeks - advised during a breakout observation is important for assessment and the opportunity to get a lab culture    Patient is upset doesn't want to pay for an office visit - is asking to take a picture and send it -     Discussed with her free care is not appropriate - this would be considered a new acute occurrence - can speak with scheduling about finding an appointment within [place] and also reviewed urgent cares as well    Patient declined and hung up     Closing encounter - no further actions needed at this time    Madi Chen RN

## 2018-04-26 ENCOUNTER — TELEPHONE (OUTPATIENT)
Dept: FAMILY MEDICINE | Facility: CLINIC | Age: 43
End: 2018-04-26

## 2018-04-26 NOTE — TELEPHONE ENCOUNTER
Writer called patient who stated:  1. Has been dispensed two different brands of OCP by her pharmacy  2. One brand she does not have menstrual period while taking, even with placebo week, and the other brand results in bleeding during placebo week   A. Not currently at home, but will go home at 1400 today  3. Should she continue placebo week, or okay to start new pack once correct order is sent in?  4. What should she do with left over OCP she does not plan to take?    Writer recommended:  1. Call clinic once has brand name information, then provider (Dr. Jorge mitchell saw patient) can be asked to prescribe that particular brand  2. Ask pharmacy how can safely dispose of leftover OCP pills.  There are medication drop off locations located within the [facility], but perhaps [facility] in [place] [facility] has its own.      Awaiting patient's return call to clinic.  Informed patient she may inform whomever answers phone what brand she needs prescribed.    BONIFACIO Payne, MICHELLEN, RN

## 2018-04-26 NOTE — TELEPHONE ENCOUNTER
The patient would like to speak to a nurse about birth control.  The patient would not give the writer further detail as the writer is not a nurse.

## 2018-05-01 NOTE — TELEPHONE ENCOUNTER
I called pt, since we had not heard from her.  She took the OCP she wanted to her pharmacy and they will fill this brand name.  Pt was not able to tell me the brand name.  MARLENA De La Paz

## 2018-05-09 ENCOUNTER — TELEPHONE (OUTPATIENT)
Dept: FAMILY MEDICINE | Facility: CLINIC | Age: 43
End: 2018-05-09

## 2018-05-16 ENCOUNTER — TELEPHONE (OUTPATIENT)
Dept: FAMILY MEDICINE | Facility: CLINIC | Age: 43
End: 2018-05-16

## 2018-05-16 NOTE — TELEPHONE ENCOUNTER
Return call to patient - left message advising with provider response - to return call to clinic to set up mychart or for any questions    Madi Chen RN

## 2018-05-16 NOTE — TELEPHONE ENCOUNTER
Return call from patient - scheduled appointment with provider for 5/16/2018    She verbalized understanding and agrees with plan    Closing encounter - no further actions needed at this time    Madi Chen RN

## 2018-05-16 NOTE — TELEPHONE ENCOUNTER
Reason for call:  Patient reporting a symptom    Symptom or request: Sweats/chills, body pains, headache    Duration (how long have symptoms been present): Pt did not report    Have you been treated for this before? Yes    Additional comments: The patients reports a hx of Lyme's disease and is requesting a lab order.    Phone Number patient can be reached at:  Home number on file [phone] (home)    Best Time:      Can we leave a detailed message on this number:  YES    Call taken on 5/16/2018 at 7:25 AM by Alma Rosa Griffin

## 2018-05-16 NOTE — TELEPHONE ENCOUNTER
1/11/18 was the last ov at .  This lab order is generally done in an ov/evisit.    Are you Ok with me encouraging the pt to do an OV or evisit?  MARLENA De La Paz

## 2018-05-18 ENCOUNTER — OFFICE VISIT (OUTPATIENT)
Dept: FAMILY MEDICINE | Facility: CLINIC | Age: 43
End: 2018-05-18
Payer: COMMERCIAL

## 2018-05-18 VITALS
DIASTOLIC BLOOD PRESSURE: 83 MMHG | WEIGHT: 173 LBS | TEMPERATURE: 98.6 F | HEART RATE: 81 BPM | SYSTOLIC BLOOD PRESSURE: 130 MMHG | BODY MASS INDEX: 26.3 KG/M2 | RESPIRATION RATE: 16 BRPM | OXYGEN SATURATION: 96 %

## 2018-05-18 DIAGNOSIS — R51.9 NONINTRACTABLE EPISODIC HEADACHE, UNSPECIFIED HEADACHE TYPE: ICD-10-CM

## 2018-05-18 DIAGNOSIS — R52 BODY ACHES: ICD-10-CM

## 2018-05-18 DIAGNOSIS — J45.30 MILD PERSISTENT ASTHMA WITHOUT COMPLICATION: ICD-10-CM

## 2018-05-18 DIAGNOSIS — R68.83 CHILLS: Primary | ICD-10-CM

## 2018-05-18 DIAGNOSIS — R61 GENERALIZED HYPERHIDROSIS: ICD-10-CM

## 2018-05-18 LAB
BASOPHILS # BLD AUTO: 0 10E9/L (ref 0–0.2)
BASOPHILS NFR BLD AUTO: 0.3 %
DIFFERENTIAL METHOD BLD: NORMAL
EOSINOPHIL # BLD AUTO: 0.2 10E9/L (ref 0–0.7)
EOSINOPHIL NFR BLD AUTO: 2.4 %
ERYTHROCYTE [DISTWIDTH] IN BLOOD BY AUTOMATED COUNT: 12.4 % (ref 10–15)
HCT VFR BLD AUTO: 39.6 % (ref 35–47)
HGB BLD-MCNC: 13.6 G/DL (ref 11.7–15.7)
LYMPHOCYTES # BLD AUTO: 1.9 10E9/L (ref 0.8–5.3)
LYMPHOCYTES NFR BLD AUTO: 23.9 %
MCH RBC QN AUTO: 30.7 PG (ref 26.5–33)
MCHC RBC AUTO-ENTMCNC: 34.3 G/DL (ref 31.5–36.5)
MCV RBC AUTO: 89 FL (ref 78–100)
MONOCYTES # BLD AUTO: 0.4 10E9/L (ref 0–1.3)
MONOCYTES NFR BLD AUTO: 5.4 %
NEUTROPHILS # BLD AUTO: 5.4 10E9/L (ref 1.6–8.3)
NEUTROPHILS NFR BLD AUTO: 68 %
PLATELET # BLD AUTO: 245 10E9/L (ref 150–450)
RBC # BLD AUTO: 4.43 10E12/L (ref 3.8–5.2)
WBC # BLD AUTO: 8 10E9/L (ref 4–11)

## 2018-05-18 PROCEDURE — 86618 LYME DISEASE ANTIBODY: CPT | Performed by: FAMILY MEDICINE

## 2018-05-18 PROCEDURE — 36415 COLL VENOUS BLD VENIPUNCTURE: CPT | Performed by: FAMILY MEDICINE

## 2018-05-18 PROCEDURE — 99214 OFFICE O/P EST MOD 30 MIN: CPT | Performed by: FAMILY MEDICINE

## 2018-05-18 PROCEDURE — 80053 COMPREHEN METABOLIC PANEL: CPT | Performed by: FAMILY MEDICINE

## 2018-05-18 PROCEDURE — 85025 COMPLETE CBC W/AUTO DIFF WBC: CPT | Performed by: FAMILY MEDICINE

## 2018-05-18 NOTE — LETTER
May 21, 2018      Abigail FABRICIO Wes  [address]  [facility] [address]        Dear ,    We are writing to inform you of your test results.    Results within acceptable limits.  -Liver and gallbladder tests are normal. (ALT,AST, Alk phos, bilirubin), kidney function is normal (Cr, GFR), Sodium is normal, Potassium is normal, Calcium is normal, Glucose is normal (diabetes screening test).     Resulted Orders   CBC with platelets differential   Result Value Ref Range    WBC 8.0 4.0 - 11.0 10e9/L    RBC Count 4.43 3.8 - 5.2 10e12/L    Hemoglobin 13.6 11.7 - 15.7 g/dL    Hematocrit 39.6 35.0 - 47.0 %    MCV 89 78 - 100 fl    MCH 30.7 26.5 - 33.0 pg    MCHC 34.3 31.5 - 36.5 g/dL    RDW 12.4 10.0 - 15.0 %    Platelet Count 245 150 - 450 10e9/L    Diff Method Automated Method     % Neutrophils 68.0 %    % Lymphocytes 23.9 %    % Monocytes 5.4 %    % Eosinophils 2.4 %    % Basophils 0.3 %    Absolute Neutrophil 5.4 1.6 - 8.3 10e9/L    Absolute Lymphocytes 1.9 0.8 - 5.3 10e9/L    Absolute Monocytes 0.4 0.0 - 1.3 10e9/L    Absolute Eosinophils 0.2 0.0 - 0.7 10e9/L    Absolute Basophils 0.0 0.0 - 0.2 10e9/L   Comprehensive metabolic panel   Result Value Ref Range    Sodium 140 133 - 144 mmol/L    Potassium 3.8 3.4 - 5.3 mmol/L    Chloride 106 94 - 109 mmol/L    Carbon Dioxide 25 20 - 32 mmol/L    Anion Gap 9 3 - 14 mmol/L    Glucose 107 (H) 70 - 99 mg/dL      Comment:      Non Fasting    Urea Nitrogen 9 7 - 30 mg/dL    Creatinine 0.63 0.52 - 1.04 mg/dL    GFR Estimate >90 >60 mL/min/1.7m2      Comment:      Non  GFR Calc    GFR Estimate If Black >90 >60 mL/min/1.7m2      Comment:       GFR Calc    Calcium 8.7 8.5 - 10.1 mg/dL    Bilirubin Total 0.3 0.2 - 1.3 mg/dL    Albumin 3.7 3.4 - 5.0 g/dL    Protein Total 7.4 6.8 - 8.8 g/dL    Alkaline Phosphatase 60 40 - 150 U/L    ALT 37 0 - 50 U/L    AST 20 0 - 45 U/L       If you have any questions or concerns, please call the clinic at the  number listed above.       Sincerely,        Beryl Patel MD/nr

## 2018-05-18 NOTE — PROGRESS NOTES
Results within acceptable limits.  -Normal red blood cell (hgb) levels, normal white blood cell count and normal platelet levels. Rest of tests pending.

## 2018-05-18 NOTE — LETTER
May 21, 2018      Abigail FABRICIO Wes  [address]  [facility] [address]        Dear ,    We are writing to inform you of your test results.    Results within acceptable limits.  -Normal red blood cell (hgb) levels, normal white blood cell count and normal platelet levels. Rest of tests pending.    Resulted Orders   CBC with platelets differential   Result Value Ref Range    WBC 8.0 4.0 - 11.0 10e9/L    RBC Count 4.43 3.8 - 5.2 10e12/L    Hemoglobin 13.6 11.7 - 15.7 g/dL    Hematocrit 39.6 35.0 - 47.0 %    MCV 89 78 - 100 fl    MCH 30.7 26.5 - 33.0 pg    MCHC 34.3 31.5 - 36.5 g/dL    RDW 12.4 10.0 - 15.0 %    Platelet Count 245 150 - 450 10e9/L    Diff Method Automated Method     % Neutrophils 68.0 %    % Lymphocytes 23.9 %    % Monocytes 5.4 %    % Eosinophils 2.4 %    % Basophils 0.3 %    Absolute Neutrophil 5.4 1.6 - 8.3 10e9/L    Absolute Lymphocytes 1.9 0.8 - 5.3 10e9/L    Absolute Monocytes 0.4 0.0 - 1.3 10e9/L    Absolute Eosinophils 0.2 0.0 - 0.7 10e9/L    Absolute Basophils 0.0 0.0 - 0.2 10e9/L     If you have any questions or concerns, please call the clinic at the number listed above.   Sincerely,  Beryl Patel MD/nr

## 2018-05-18 NOTE — MR AVS SNAPSHOT
"              After Visit Summary   5/18/2018    Abigail Harvey    MRN: [id number]           Patient Information     Date Of Birth          1975        Visit Information        Provider Department      5/18/2018 2:40 PM Beryl Patel MD [facility]        Today's Diagnoses     Chills    -  1    Generalized hyperhidrosis        Body aches        Nonintractable episodic headache, unspecified headache type        Mild persistent asthma without complication          Care Instructions    Labs today   Could be viral, heat stroke, dehydration,   lymes does not predispose to lyme's but will recheck   Recent travel unlikely to cause  if no answer and headaches persist need to work up headaches with imaging and neuro consult   Push fluids           Follow-ups after your visit        Who to contact     If you have questions or need follow up information about today's clinic visit or your schedule please contact [facility] directly at [phone].  Normal or non-critical lab and imaging results will be communicated to you by MyChart, letter or phone within 4 business days after the clinic has received the results. If you do not hear from us within 7 days, please contact the clinic through MyChart or phone. If you have a critical or abnormal lab result, we will notify you by phone as soon as possible.  Submit refill requests through [employer] or call your pharmacy and they will forward the refill request to us. Please allow 3 business days for your refill to be completed.          Additional Information About Your Visit        MyChart Information     [employer] lets you send messages to your doctor, view your test results, renew your prescriptions, schedule appointments and more. To sign up, go to [email or web address] . Click on \"Log in\" on the left side of the screen, which will take you to the Welcome page. Then click on \"Sign up Now\" on the right side of the page.     You will be asked to " enter the access code listed below, as well as some personal information. Please follow the directions to create your username and password.     Your access code is: [email or web address]  Expires: 2018  3:14 PM     Your access code will  in 90 days. If you need help or a new code, please call your [place] clinic or [phone].        Care EveryWhere ID     This is your Care EveryWhere ID. This could be used by other organizations to access your [place] medical records  [id number]        Your Vitals Were     Pulse Temperature Respirations Pulse Oximetry BMI (Body Mass Index)       81 98.6  F (37  C) (Oral) 16 96% 26.3 kg/m2        Blood Pressure from Last 3 Encounters:   18 130/83   18 124/75   10/09/17 128/83    Weight from Last 3 Encounters:   18 173 lb (78.5 kg)   18 171 lb (77.6 kg)   10/09/17 167 lb (75.8 kg)              We Performed the Following     CBC with platelets differential     Comprehensive metabolic panel     Lyme Disease Melinda with reflex to WB Serum        Primary Care Provider Office Phone # Fax #    Deqa Isaura Packer [phone]       [address] [facility] [address]        Equal Access to Services     [facility] : Hadii aad ku hadasho Soomaali, waaxda luqadaha, qaybta kaalmada adeegyada, leyla mainin hayaan deandre mcgraw . So [facility] [phone].    ATENCIÓN: Si habla español, tiene a byrd disposición servicios gratuitos de asistencia lingüística. Llame al [phone].    We comply with applicable federal civil rights laws and [place] laws. We do not discriminate on the basis of race, color, national origin, age, disability, sex, sexual orientation, or gender identity.            Thank you!     Thank you for choosing [facility]  for your care. Our goal is always to provide you with excellent care. Hearing back from our patients is one way we can continue to improve our services. Please take a few minutes to complete  the written survey that you may receive in the mail after your visit with us. Thank you!             Your Updated Medication List - Protect others around you: Learn how to safely use, store and throw away your medicines at www.[email or web address].org.          This list is accurate as of 5/18/18  3:14 PM.  Always use your most recent med list.                   Brand Name Dispense Instructions for use Diagnosis    albuterol 108 (90 Base) MCG/ACT Inhaler    PROAIR HFA/PROVENTIL HFA/VENTOLIN HFA    1 Inhaler    Inhale 2 puffs into the lungs every 6 hours as needed for shortness of breath / dyspnea or wheezing    Mild persistent asthma without complication       ALLEGRA PO           fluticasone-salmeterol 115-21 MCG/ACT Inhaler    ADVAIR-HFA    36 g    Inhale 2 puffs into the lungs 2 times daily    Mild persistent asthma without complication       * levonorgestrel-ethinyl estradiol 0.15-0.03 MG per tablet    JOLESSA    84 tablet    Take 1 tablet by mouth daily    Excessive or frequent menstruation       * levonorgestrel-ethinyl estradiol 0.15-0.03 MG per tablet    SEASONALE    91 tablet    TAKE 1 TABLET BY MOUTH DAILY    Excessive or frequent menstruation       * Notice:  This list has 2 medication(s) that are the same as other medications prescribed for you. Read the directions carefully, and ask your doctor or other care provider to review them with you.

## 2018-05-18 NOTE — PROGRESS NOTES
SUBJECTIVE:   Abigail Harvey is a 42 year old female who presents to clinic today for the following health issues:    Has had a headaches disturbance of sleep, body aches, eye hurts , sweating and chills last 8 days, due to symptoms similar to when she had lyme's last years would like blood test for lyme's upset to be here . Feels every time she calls to get a refill or test sis told to come in and cannot afford dit. Daughter was sick with fever and headache recently. Was in [place] or vacation in march went in low season , on known mosquito bites,     No fever  or dizziness, no double or blurry vision, no facial pain, earache, sore throat, runny nose, post nasal drip, no trouble hearing, smelling, tasting or swallowing, no cough , no chest pain, trouble breathing or palpitations, No abdominal pain, heart burn, reflux, nausea or vomiting or diarrhea or constipation, no blood in stools or black stools, no weight loss or night sweats. No dysuria, hematuria, frequency, urgency, hesitancy, incontinence, No pelvic complaints. No leg swelling or joint pain. No rash.    ACT=19    Hx of mild persistent asthma, menorrhagia on BCP S, Left thumb injury 2/2017 S/P hand therapy, chondromal patella, prior C /sections x2, tooth extraction, seen 5/29/17 for sore throat, strep negative. Seen 10/9 for body aches, fatigue, sore throat, abdominal pain, asthma exacerbation, going on since august with suggestion of tonsillitis, given Amoxil, Advair refilled, cbc, B 12, vit d, BMP, TSH were normal, LFT S were elevated, had some iron deficiency, tested positive for lyme's treated for doxycycline 28 days. Hep A, B & C negative and immune to hep A & B. on 10/27 lfts back to normal and ferritin normal, 12/30 ACT noted as 21. u/S abdomen never done. Seen 1/11/18 for a physical. Takes BCP for heavy periods that were causing her anemia in the past. counseled risk of blood clot, stroke, endometrial and breast cancer increase with age more  than 35, smoking, obesity and family history . She seems low risk other than her weight. Decided to continue to take till age 44 then see gyn for options. Asthma action plan given. Increased Advair to 115 /21 2 puffs twice a day as not controlled. Albuterol as needed. Continue allergy pill. Referral to allergist given.  iron, CMP, lipids were normal. [place]  negative.  Problem list and histories reviewed & adjusted, as indicated.  Additional history: as documented    Patient Active Problem List   Diagnosis     Chondromalacia of patella     Excessive or frequent menstruation     Mild persistent asthma without complication     BMI 26.0-26.9,adult     Past Surgical History:   Procedure Laterality Date      SECTION      x2     HC TOOTH EXTRACTION W/FORCEP         Social History   Substance Use Topics     Smoking status: Never Smoker     Smokeless tobacco: Never Used     Alcohol use Yes      Comment: occasional     Family History   Problem Relation Age of Onset     HEART DISEASE Father      heart attack     CANCER Father      lung     Hypertension Mother          Current Outpatient Prescriptions   Medication Sig Dispense Refill     albuterol (PROAIR HFA/PROVENTIL HFA/VENTOLIN HFA) 108 (90 BASE) MCG/ACT Inhaler Inhale 2 puffs into the lungs every 6 hours as needed for shortness of breath / dyspnea or wheezing 1 Inhaler 0     Fexofenadine HCl (ALLEGRA PO)        fluticasone-salmeterol (ADVAIR-HFA) 115-21 MCG/ACT Inhaler Inhale 2 puffs into the lungs 2 times daily 36 g 1     levonorgestrel-ethinyl estradiol (SEASONALE) 0.15-0.03 MG per tablet TAKE 1 TABLET BY MOUTH DAILY 91 tablet 2     No Known Allergies  Recent Labs   Lab Test  18   1512  18   0952  10/27/17   0954   10/09/17   1700   LDL   --   101*   --    --    --    HDL   --   57   --    --    --    TRIG   --   152*   --    --    --    ALT  37  36  41   < >  111*   CR  0.63  0.78   --    --   0.69   GFRESTIMATED  >90  80   --    --   >90    GFRESTBLACK  >90  >90   --    --   >90   POTASSIUM  3.8  4.3   --    --   4.0   TSH   --    --    --    --   1.22    < > = values in this interval not displayed.      BP Readings from Last 3 Encounters:   05/18/18 130/83   01/11/18 124/75   10/09/17 128/83    Wt Readings from Last 3 Encounters:   05/18/18 173 lb (78.5 kg)   01/11/18 171 lb (77.6 kg)   10/09/17 167 lb (75.8 kg)                  Labs reviewed in EPIC    Reviewed and updated as needed this visit by clinical staff       Reviewed and updated as needed this visit by Provider         ROS:  Constitutional, HEENT, cardiovascular, pulmonary, GI, , musculoskeletal, neuro, skin, endocrine and psych systems are negative, except as otherwise noted.    OBJECTIVE:     /83 (BP Location: Left arm, Patient Position: Chair, Cuff Size: Adult Regular)  Pulse 81  Temp 98.6  F (37  C) (Oral)  Resp 16  Wt 173 lb (78.5 kg)  SpO2 96%  BMI 26.3 kg/m2  Body mass index is 26.3 kg/(m^2).  GENERAL: healthy, alert and no distress  EYES: Eyes grossly normal to inspection, PERRL and conjunctivae and sclerae normal  HENT: ear canals and TM's normal, nose and mouth without ulcers or lesions  NECK: no adenopathy, no asymmetry, masses, or scars and thyroid normal to palpation  RESP: lungs clear to auscultation - no rales, rhonchi or wheezes  CV: regular rate and rhythm, normal S1 S2, no S3 or S4, no murmur, click or rub, no peripheral edema and peripheral pulses strong  ABDOMEN: soft, non tender, no hepatosplenomegaly, no masses and bowel sounds normal  MS: no gross musculoskeletal defects noted, no edema  SKIN: no suspicious lesions or rashes  NEURO: Normal strength and tone, mentation intact and speech normal  PSYCH: mentation appears normal, anxious and appearance well groomed    Diagnostic Test Results:  none     ASSESSMENT/PLAN:   Hx of mild persistent asthma on albuterol and Advair managed by the allergist, menorrhagia, with resolved anemia on BCP's, BMI 26, patella  chondromalacia, hx of lyme's treated aug to ct 2017 with resolution of elevated liver tests, prior left thumb injury, tooth extraction, C section, seen 1/11/18 for a physical, iron, CMP, lipids were normal here for       ICD-10-CM    1. Chills R68.83 CBC with platelets differential     Comprehensive metabolic panel     Lyme Disease Melinda with reflex to WB Serum   2. Generalized hyperhidrosis R61 CBC with platelets differential     Comprehensive metabolic panel     Lyme Disease Melinda with reflex to WB Serum   3. Body aches R52 CBC with platelets differential     Comprehensive metabolic panel     Lyme Disease Melinda with reflex to WB Serum   4. Nonintractable episodic headache, unspecified headache type R51 CBC with platelets differential     Comprehensive metabolic panel     Lyme Disease Melinda with reflex to WB Serum   5. Mild persistent asthma without complication J45.30      Vitals and exam benign. No red flag sign. Unclear etiology/diagnosis with uncertain prognosis requiring further workup below. Labs today. Could be viral, heat stroke, dehydration, Lyme's does not predispose to lyme's but will recheck for reinfection. Risk low this time of year.Recent travel unlikely to cause but presence of Zika, chikungunya, etc can cause similar symptoms but unlikely to 2 months out. Symptoms do not sound like malaria.  if no answer and headaches persist need to work up headaches with imaging and neuro consult. Push fluids   See Patient Instructions    Beryl Patel MD  [facility]

## 2018-05-18 NOTE — PATIENT INSTRUCTIONS
Labs today   Could be viral, heat stroke, dehydration,   lymes does not predispose to lyme's but will recheck   Recent travel unlikely to cause  if no answer and headaches persist need to work up headaches with imaging and neuro consult   Push fluids

## 2018-05-19 LAB
ALBUMIN SERPL-MCNC: 3.7 G/DL (ref 3.4–5)
ALP SERPL-CCNC: 60 U/L (ref 40–150)
ALT SERPL W P-5'-P-CCNC: 37 U/L (ref 0–50)
ANION GAP SERPL CALCULATED.3IONS-SCNC: 9 MMOL/L (ref 3–14)
AST SERPL W P-5'-P-CCNC: 20 U/L (ref 0–45)
BILIRUB SERPL-MCNC: 0.3 MG/DL (ref 0.2–1.3)
BUN SERPL-MCNC: 9 MG/DL (ref 7–30)
CALCIUM SERPL-MCNC: 8.7 MG/DL (ref 8.5–10.1)
CHLORIDE SERPL-SCNC: 106 MMOL/L (ref 94–109)
CO2 SERPL-SCNC: 25 MMOL/L (ref 20–32)
CREAT SERPL-MCNC: 0.63 MG/DL (ref 0.52–1.04)
GFR SERPL CREATININE-BSD FRML MDRD: >90 ML/MIN/1.7M2
GLUCOSE SERPL-MCNC: 107 MG/DL (ref 70–99)
POTASSIUM SERPL-SCNC: 3.8 MMOL/L (ref 3.4–5.3)
PROT SERPL-MCNC: 7.4 G/DL (ref 6.8–8.8)
SODIUM SERPL-SCNC: 140 MMOL/L (ref 133–144)

## 2018-05-19 ASSESSMENT — ASTHMA QUESTIONNAIRES: ACT_TOTALSCORE: 19

## 2018-05-21 ENCOUNTER — TELEPHONE (OUTPATIENT)
Dept: FAMILY MEDICINE | Facility: CLINIC | Age: 43
End: 2018-05-21

## 2018-05-21 LAB — B BURGDOR IGG+IGM SER QL: 0.74 (ref 0–0.89)

## 2018-05-21 NOTE — TELEPHONE ENCOUNTER
Please call negative for lymes & all tests wnl.   Headaches and symptoms could be viral , could be dehydration, seems less likely from trip in march & possible exposure there   Drink more water   If symptoms persist can refer to neuro for headaches  Can close the encounter when done. thanks

## 2018-05-21 NOTE — TELEPHONE ENCOUNTER
Left voice mail asking patient to call back and ask to speak with triage nurse for a mg from Kelly Varela RN

## 2018-05-22 ENCOUNTER — NURSE TRIAGE (OUTPATIENT)
Dept: NURSING | Facility: CLINIC | Age: 43
End: 2018-05-22

## 2018-05-22 ENCOUNTER — TELEPHONE (OUTPATIENT)
Dept: FAMILY MEDICINE | Facility: CLINIC | Age: 43
End: 2018-05-22

## 2018-05-22 NOTE — TELEPHONE ENCOUNTER
Patient said she received a voicemail message from Dr Patel's office. She was given the following information from Dr Beryl Patel:    Please call negative for lymes & all tests wnl.   Headaches and symptoms could be viral , could be dehydration, seems less likely from trip in march & possible exposure there   Drink more water   If symptoms persist can refer to neuro for headaches    Luana Vu RN/FNA

## 2018-09-13 ENCOUNTER — TELEPHONE (OUTPATIENT)
Dept: FAMILY MEDICINE | Facility: CLINIC | Age: 43
End: 2018-09-13

## 2018-09-13 NOTE — TELEPHONE ENCOUNTER
ACT was done over the phone.    ACT Total Scores 1/11/2018 5/18/2018 9/13/2018   ACT TOTAL SCORE (Goal Greater than or Equal to 20) 18 19 23   In the past 12 months, how many times did you visit the emergency room for your asthma without being admitted to the hospital? 0 0 0   In the past 12 months, how many times were you hospitalized overnight because of your asthma? 0 0 0     Odessa Fong MA

## 2018-09-14 ASSESSMENT — ASTHMA QUESTIONNAIRES: ACT_TOTALSCORE: 23

## 2018-10-15 DIAGNOSIS — N92.0 EXCESSIVE OR FREQUENT MENSTRUATION: ICD-10-CM

## 2018-10-15 RX ORDER — LEVONORGESTREL AND ETHINYL ESTRADIOL 0.15-0.03
KIT ORAL
Qty: 91 TABLET | Refills: 2 | Status: SHIPPED | OUTPATIENT
Start: 2018-10-15 | End: 2019-07-06

## 2018-10-15 NOTE — TELEPHONE ENCOUNTER
"Requested Prescriptions   Pending Prescriptions Disp Refills     levonorgestrel-ethinyl estradiol (SEASONALE) 0.15-0.03 MG per tablet [Pharmacy Med Name: LEVONOR-ETH ESTRAD 0.15-0.03] 91 tablet 2     Sig: TAKE 1 TABLET BY MOUTH DAILY    Contraceptives Protocol Passed    10/15/2018  1:42 AM       Passed - Patient is not a current smoker if age is 35 or older       Passed - Recent (12 mo) or future (30 days) visit within the authorizing provider's specialty    Patient had office visit in the last 12 months or has a visit in the next 30 days with authorizing provider or within the authorizing provider's specialty.  See \"Patient Info\" tab in inbasket, or \"Choose Columns\" in Meds & Orders section of the refill encounter.           Passed - No active pregnancy on record       Passed - No positive pregnancy test in past 12 months        Prescription approved per [facility] Refill Protocol.    Signed Prescriptions:                        Disp   Refills    levonorgestrel-ethinyl estradiol (SEASONAL*91 tab*2        Sig: TAKE 1 TABLET BY MOUTH DAILY  Authorizing Provider: MARK BOOKER  Ordering User: PEREZ RODAS      Closing encounter - no further actions needed at this time    Perez Rodas RN      "

## 2019-03-04 ENCOUNTER — TELEPHONE (OUTPATIENT)
Dept: FAMILY MEDICINE | Facility: CLINIC | Age: 44
End: 2019-03-04

## 2019-03-04 NOTE — TELEPHONE ENCOUNTER
Writer does not find any cold sore medication on active nor historical medication list.    Writer called patient and informed her of above.  Writer informed patient options would be either office visit or eVisit through [facility] for Dr. Packer to prescribe medication for cold sores.    Patient verbalized understanding, stated she has medication in a prescription bottle at home but is running out.      Patient stated she will work on figuring things out when she gets home as she cannot come in for office visit right now.    BONIFACIO Payne, MICHELLEN, RN

## 2019-03-04 NOTE — TELEPHONE ENCOUNTER
Reason for call:  Patient reporting a symptom    Symptom or request: cold sore    Duration (how long have symptoms been present): now last one week    Have you been treated for this before? Yes    Additional comments: pt called stating she has gotten this med before for cold sore and she was told to just call when she gets one and we can order her some but I dont see any of it and she needs it now please advise thank you    Phone Number patient can be reached at:  Home number on file [phone] (home)    Best Time:  any    Can we leave a detailed message on this number:  YES    Call taken on 3/4/2019 at 4:17 PM by Lori Perez

## 2019-05-02 ENCOUNTER — TELEPHONE (OUTPATIENT)
Dept: FAMILY MEDICINE | Facility: CLINIC | Age: 44
End: 2019-05-02

## 2019-05-02 NOTE — TELEPHONE ENCOUNTER
Reason for call:  Other   Patient called regarding (reason for call): call back  Additional comments: this patient claims that she left a couple of messages for travel and not heard back. I told her I would try and reach someone for her. She and 4 others, her family are all traveling to [facility].They all need to get vaccines, please call to schedule     Phone number to reach patient:  Home number on file [phone] (home)    Best Time:  asap    Can we leave a detailed message on this number?  YES

## 2019-05-08 ENCOUNTER — TELEPHONE (OUTPATIENT)
Dept: FAMILY MEDICINE | Facility: CLINIC | Age: 44
End: 2019-05-08
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
## 2019-05-09 ENCOUNTER — TELEPHONE (OUTPATIENT)
Dept: FAMILY MEDICINE | Facility: CLINIC | Age: 44
End: 2019-05-09

## 2019-05-09 NOTE — TELEPHONE ENCOUNTER
Per CDC adult her age needs, DTaP, annual flu, She is  Up to date with DTP    She will check her childhood record to make sure she has 2 MMRs and call back here if questions or check with travel clinic.     She got all her immunizations as a child, she has record at home. She is going to [facility] and appt at travel clinic soon. She was advised she can review again there.     Lisa Villanueva, RN, BSN

## 2019-05-09 NOTE — TELEPHONE ENCOUNTER
Reason for Call:  Other question about immunizations    Detailed comments: patient is asking if you need to have your immunization updated after getting when you were a child. Do you need to get again after a certain time    Phone Number Patient can be reached at: Home number on file [phone] (home)    Best Time:     Can we leave a detailed message on this number? YES    Call taken on 5/9/2019 at 9:21 AM by Macey Sr

## 2019-05-09 NOTE — TELEPHONE ENCOUNTER
Spoke with patient and told her what we had on file for her immunization records form what date to what date. She said the she has a write paper of when she was a child stating all of and when she received her shot's so she is going to bring that with her to the travel clinic

## 2019-05-23 ENCOUNTER — OFFICE VISIT (OUTPATIENT)
Dept: FAMILY MEDICINE | Facility: CLINIC | Age: 44
End: 2019-05-23
Payer: COMMERCIAL

## 2019-05-23 VITALS — TEMPERATURE: 98.2 F | DIASTOLIC BLOOD PRESSURE: 87 MMHG | SYSTOLIC BLOOD PRESSURE: 142 MMHG | HEART RATE: 72 BPM

## 2019-05-23 DIAGNOSIS — Z71.84 TRAVEL ADVICE ENCOUNTER: Primary | ICD-10-CM

## 2019-05-23 PROCEDURE — 90471 IMMUNIZATION ADMIN: CPT | Mod: GA | Performed by: NURSE PRACTITIONER

## 2019-05-23 PROCEDURE — 90472 IMMUNIZATION ADMIN EACH ADD: CPT | Mod: GA | Performed by: NURSE PRACTITIONER

## 2019-05-23 PROCEDURE — 90717 YELLOW FEVER VACCINE SUBQ: CPT | Mod: GA | Performed by: NURSE PRACTITIONER

## 2019-05-23 PROCEDURE — 90691 TYPHOID VACCINE IM: CPT | Mod: GA | Performed by: NURSE PRACTITIONER

## 2019-05-23 PROCEDURE — 99402 PREV MED CNSL INDIV APPRX 30: CPT | Mod: 25 | Performed by: NURSE PRACTITIONER

## 2019-05-23 RX ORDER — ATOVAQUONE AND PROGUANIL HYDROCHLORIDE 250; 100 MG/1; MG/1
1 TABLET, FILM COATED ORAL DAILY
Qty: 30 TABLET | Refills: 0 | Status: SHIPPED | OUTPATIENT
Start: 2019-05-23 | End: 2019-07-10

## 2019-05-23 RX ORDER — AZITHROMYCIN 500 MG/1
500 TABLET, FILM COATED ORAL DAILY
Qty: 3 TABLET | Refills: 0 | Status: SHIPPED | OUTPATIENT
Start: 2019-05-23 | End: 2019-07-10

## 2019-05-23 NOTE — PROGRESS NOTES
Nurse Note      Itinerary:  [facility]      Departure Date: 07/18/2019      Return Date: 08/03/2019      Length of Trip 3 weeks      Reason for Travel: Tourism           Urban or rural: both      Accommodations: Friends        IMMUNIZATION HISTORY  Have you received any immunizations within the past 4 weeks?  No  Have you ever fainted from having your blood drawn or from an injection?  No  Have you ever had a fever reaction to vaccination?  No  Have you ever had any bad reaction or side effect from any vaccination?  No  Have you ever had hepatitis A or B vaccine?  Yes  Do you live (or work closely) with anyone who has AIDS, an AIDS-like condition, any other immune disorder or who is on chemotherapy for cancer?  No  Do you have a family history of immunodeficiency?  No  Have you received any injection of immune globulin or any blood products during the past 12 months?  No    Patient roomed by All.RONEN Carrascojuan r Harvey is a 43 year old female seen today  with both parents for counsultation for international travel to [facility] for Tourism  Visiting friends.  Patient will be departing in  2 month(s) and staying for   2 week(s) and  traveling with family member(s).      Patient itinerary :  will be in the [facility]  region of [facility] which presents risk for Malaria and Yellow Fever. exposure.      Patient's activities will include sightseeing, visiting friends and relatives and [employer]/game chou.    Patient's country of birth is [place]    Special medical concerns: asthma  Pre-travel questionnaire was completed by patient and reviewed by provider.       Special medical concerns: h/o asthma  Pre-travel questionnaire was completed by patient and reviewed by provider.     Vitals: There were no vitals taken for this visit.  BMI= There is no height or weight on file to calculate BMI.    EXAM:  General:  Well-nourished, well-developed in no acute distress.  Appears to be stated age, interacts  appropriately and expresses understanding of information given to patient.    Current Outpatient Medications   Medication Sig Dispense Refill     albuterol (PROAIR HFA/PROVENTIL HFA/VENTOLIN HFA) 108 (90 BASE) MCG/ACT Inhaler Inhale 2 puffs into the lungs every 6 hours as needed for shortness of breath / dyspnea or wheezing 1 Inhaler 0     Fexofenadine HCl (ALLEGRA PO)        fluticasone-salmeterol (ADVAIR-HFA) 115-21 MCG/ACT Inhaler Inhale 2 puffs into the lungs 2 times daily 36 g 1     levonorgestrel-ethinyl estradiol (SEASONALE) 0.15-0.03 MG per tablet TAKE 1 TABLET BY MOUTH DAILY 91 tablet 2     Patient Active Problem List   Diagnosis     Chondromalacia of patella     Excessive or frequent menstruation     Mild persistent asthma without complication     BMI 26.0-26.9,adult     No Known Allergies      Immunizations discussed include:   Hepatitis A:  Up to date  Hepatitis B: Up to date  Influenza: Up to date  Typhoid: Ordered/given today, risks, benefits and side effects reviewed  Rabies: Declined  Not concerned about risk of disease  Yellow Fever: Stamaril Ordered/given today - consent completed, side effects, precautions, allergies, risks discussed. Patient expressed understanding.  Tunisian Encephalitis: Not indicated  Meningococcus: Declined  Not concerned about risk of disease  Tetanus/Diphtheria: Up to date  Measles/Mumps/Rubella: Up to date  Cholera: Not needed  Polio: Up to date  Pneumococcal: Under age of 65  Varicella: Immune by disease history per patient report  Zostavax:  Not indicated  Shingrix: Not indicated  HPV:  Not indicated  TB:  Low risk    Stamaril Informed Consent    The patient was provided with a copy of the IRB-approved consent form and all questions were answered before the patient agreed to participate by signing the informed consent document.   A copy of the form was provided to the patient.    Date: May 27, 2019   Consent Version Date: 5/10/2017   Consent Obtained by:  Jodie ANDRADE (Lori)  Edwards CNP     HIPAA:  Yes  HIPAA Authorization Signed Date: 5/23/2019      Inclusion/Exclusion Criteria:    (Similar to Yellow Fever-VAX)      The patient met all of the following inclusion criteria in order to be eligible for the Stamaril vaccination under this EAP (Expanded Access Investigational New Drug Program)           At increased risk for YF, including researchers, laboratory workers, vaccine production staff, and those who are traveling within 30 days to a YF-endemic region or to a country requiring proof of YF vaccination under IHRs (International Health Regulations)?       Yes     Patient is greater than or equal to 9 months of age on the day of vaccination?     Yes     Patient is greater than or equal to 18 years of age and signed and dated the Consent Forms?     Yes     Patient is < 18 years of age and parent(s)/guardian(s) signed and dated the Consent Forms?      Patient is 7 years to < 18 years of age and signed and dated the Assent form?        No Assent is required.  Patient is <7 years of age.     No      No      N/A     The patient did not meet any of the following criteria that would have excluded the patient from receiving the Stamaril vaccination under this EAP              Patient is less than 9 months of age.       No     The patient is breast-feeding and cannot stop nursing for at least 14 days after vaccination.    Note: Yellow Fever vaccine virus may be transmissible via breast milk by nursing mothers who are vaccinated during the final 2 weeks of pregnancy or post-partum.   Following transmission, infants may develop encephalitis.  The minimum time of discontinuation of breastfeeding for 14 days after vaccination is based on the expected clearance of live-attenuated vaccine virus.       No     The patient is immunosuppressed, whether congenital or idiopathic, including for example, leukemia, lymphoma, other malignancies, and patients who are receiving immunosuppressant medications (e.g.  Systemic corticosteroids [greater than the standard dose of topical or inhaled steroids], alkylating drugs, antimetabolites, of other cytotoxic or immunomodulatory drugs) or radiation therapy or organ transplantation.       No     The patient has known hypersensitivity to the active substance or to any of the excipients of Stamaril vaccine or to eggs or chicken proteins.     No     The patient is symptomatic for human immunodeficiency virus (HIV) infection     No     The patient is asymptomatic for HIV infection but accompanied by evidence of severe immune suppression    Note:  Evidence of severe immune suppression includes CD4+ T-cell counts < 200 cubic millimeters (or < 15% total lymphocytes in children aged < 6 years), or as determined by the health care provider.       No     The patient has a history of thymus dysfunction (including myasthenia gravis, thymoma, thymectomy)     No     Moderate or severe febrile illness or acute illness    Note: Participation in the EAP can be reassessed when moderate or severe febrile illness or acute illness has resolved.       No         Altitude Exposure on this trip: no  Past tolerance to Altitude: na    ASSESSMENT/PLAN:    ICD-10-CM    1. Travel advice encounter Z71.89 azithromycin (ZITHROMAX) 500 MG tablet     atovaquone-proguanil (MALARONE) 250-100 MG tablet     I have reviewed general recommendations for safe travel   including: food/water precautions, insect precautions, safer sex   practices given high prevalence of Zika, HIV and other STDs,   roadway safety. Educational materials and [employer] report provided.    Malaraia prophylaxis recommended: Malarone  Symptomatic treatment for traveler's diarrhea: azithromycin  Altitude illness prevention and treatment: na      Evacuation insurance advised and resources were provided to patient.    Total visit time 30 minutes  with over 50% of time spent counseling patient as detailed above.    Jodie Edwards  CNP

## 2019-05-23 NOTE — PATIENT INSTRUCTIONS
Today May 23, 2019 you received the    Yellow Fever (YF)    Typhoid - injectable. This vaccine is valid for two years.   .    These appointments can be made as a NURSE ONLY visit.    **It is very important for the vaccinations to be given on the scheduled day(s), this helps ensure you receive the full effectiveness of the vaccine.**    Please call [facility] with any questions [phone]    Thank you for visiting [facility]

## 2019-07-06 DIAGNOSIS — N92.0 EXCESSIVE OR FREQUENT MENSTRUATION: ICD-10-CM

## 2019-07-07 RX ORDER — LEVONORGESTREL AND ETHINYL ESTRADIOL 0.15-0.03
KIT ORAL
Qty: 28 TABLET | Refills: 0 | Status: SHIPPED | OUTPATIENT
Start: 2019-07-07 | End: 2019-07-10

## 2019-07-07 NOTE — TELEPHONE ENCOUNTER
"Requested Prescriptions   Pending Prescriptions Disp Refills     levonorgestrel-ethinyl estradiol (SEASONALE) 0.15-0.03 MG tablet [Pharmacy Med Name: LEVONOR-ETH ESTRAD 0.15-0.03] 91 tablet 2     Sig: TAKE 1 TABLET BY MOUTH DAILY       Contraceptives Protocol Failed - 7/6/2019  2:23 PM        Failed - Recent (12 mo) or future (30 days) visit within the authorizing provider's specialty     Patient had office visit in the last 12 months or has a visit in the next 30 days with authorizing provider or within the authorizing provider's specialty.  See \"Patient Info\" tab in inbasket, or \"Choose Columns\" in Meds & Orders section of the refill encounter.              Passed - Patient is not a current smoker if age is 35 or older        Passed - Medication is active on med list        Passed - No active pregnancy on record        Passed - No positive pregnancy test in past 12 months        Medication is being filled for 1 time refill only due to:  Patient needs to be seen because it has been more than one year since last visit.     Signed Prescriptions:                        Disp   Refills    levonorgestrel-ethinyl estradiol (SEASONAL*28 tab*0        Sig: TAKE 1 TABLET BY MOUTH DAILY  Authorizing Provider: MARK BOOKER  Ordering User: PEREZ RODAS Reception - one month due for annual office visit please    "

## 2019-07-10 ENCOUNTER — OFFICE VISIT (OUTPATIENT)
Dept: FAMILY MEDICINE | Facility: CLINIC | Age: 44
End: 2019-07-10
Payer: COMMERCIAL

## 2019-07-10 VITALS
HEIGHT: 69 IN | SYSTOLIC BLOOD PRESSURE: 132 MMHG | DIASTOLIC BLOOD PRESSURE: 89 MMHG | WEIGHT: 173.5 LBS | BODY MASS INDEX: 25.7 KG/M2 | HEART RATE: 67 BPM | OXYGEN SATURATION: 100 %

## 2019-07-10 DIAGNOSIS — N92.0 EXCESSIVE OR FREQUENT MENSTRUATION: ICD-10-CM

## 2019-07-10 DIAGNOSIS — J45.30 MILD PERSISTENT ASTHMA WITHOUT COMPLICATION: ICD-10-CM

## 2019-07-10 PROCEDURE — 99213 OFFICE O/P EST LOW 20 MIN: CPT | Performed by: FAMILY MEDICINE

## 2019-07-10 RX ORDER — FLUTICASONE PROPIONATE AND SALMETEROL XINAFOATE 115; 21 UG/1; UG/1
2 AEROSOL, METERED RESPIRATORY (INHALATION) 2 TIMES DAILY
Qty: 36 G | Refills: 3 | Status: SHIPPED | OUTPATIENT
Start: 2019-07-10 | End: 2020-11-10

## 2019-07-10 RX ORDER — ALBUTEROL SULFATE 90 UG/1
2 AEROSOL, METERED RESPIRATORY (INHALATION) EVERY 6 HOURS PRN
Qty: 1 INHALER | Refills: 3 | Status: SHIPPED | OUTPATIENT
Start: 2019-07-10

## 2019-07-10 RX ORDER — FLUTICASONE PROPIONATE AND SALMETEROL XINAFOATE 115; 21 UG/1; UG/1
2 AEROSOL, METERED RESPIRATORY (INHALATION) 2 TIMES DAILY
Qty: 36 G | Refills: 1 | Status: SHIPPED | OUTPATIENT
Start: 2019-07-10 | End: 2019-07-10

## 2019-07-10 RX ORDER — LEVONORGESTREL AND ETHINYL ESTRADIOL 0.15-0.03
1 KIT ORAL DAILY
Qty: 84 TABLET | Refills: 3 | Status: SHIPPED | OUTPATIENT
Start: 2019-07-10 | End: 2019-11-11

## 2019-07-10 ASSESSMENT — ASTHMA QUESTIONNAIRES
QUESTION_3 LAST FOUR WEEKS HOW OFTEN DID YOUR ASTHMA SYMPTOMS (WHEEZING, COUGHING, SHORTNESS OF BREATH, CHEST TIGHTNESS OR PAIN) WAKE YOU UP AT NIGHT OR EARLIER THAN USUAL IN THE MORNING: ONCE OR TWICE
ACT_TOTALSCORE: 22
QUESTION_2 LAST FOUR WEEKS HOW OFTEN HAVE YOU HAD SHORTNESS OF BREATH: NOT AT ALL
QUESTION_4 LAST FOUR WEEKS HOW OFTEN HAVE YOU USED YOUR RESCUE INHALER OR NEBULIZER MEDICATION (SUCH AS ALBUTEROL): ONCE A WEEK OR LESS
QUESTION_5 LAST FOUR WEEKS HOW WOULD YOU RATE YOUR ASTHMA CONTROL: WELL CONTROLLED
QUESTION_1 LAST FOUR WEEKS HOW MUCH OF THE TIME DID YOUR ASTHMA KEEP YOU FROM GETTING AS MUCH DONE AT WORK, SCHOOL OR AT HOME: NONE OF THE TIME

## 2019-07-10 ASSESSMENT — ANXIETY QUESTIONNAIRES
3. WORRYING TOO MUCH ABOUT DIFFERENT THINGS: NOT AT ALL
6. BECOMING EASILY ANNOYED OR IRRITABLE: SEVERAL DAYS
1. FEELING NERVOUS, ANXIOUS, OR ON EDGE: SEVERAL DAYS
7. FEELING AFRAID AS IF SOMETHING AWFUL MIGHT HAPPEN: NOT AT ALL
5. BEING SO RESTLESS THAT IT IS HARD TO SIT STILL: NOT AT ALL
GAD7 TOTAL SCORE: 2
GAD7 TOTAL SCORE: 2
7. FEELING AFRAID AS IF SOMETHING AWFUL MIGHT HAPPEN: NOT AT ALL
2. NOT BEING ABLE TO STOP OR CONTROL WORRYING: NOT AT ALL
GAD7 TOTAL SCORE: 2
4. TROUBLE RELAXING: NOT AT ALL

## 2019-07-10 ASSESSMENT — PATIENT HEALTH QUESTIONNAIRE - PHQ9
10. IF YOU CHECKED OFF ANY PROBLEMS, HOW DIFFICULT HAVE THESE PROBLEMS MADE IT FOR YOU TO DO YOUR WORK, TAKE CARE OF THINGS AT HOME, OR GET ALONG WITH OTHER PEOPLE: NOT DIFFICULT AT ALL
SUM OF ALL RESPONSES TO PHQ QUESTIONS 1-9: 0
SUM OF ALL RESPONSES TO PHQ QUESTIONS 1-9: 0

## 2019-07-10 ASSESSMENT — MIFFLIN-ST. JEOR: SCORE: 1501.37

## 2019-07-10 NOTE — PROGRESS NOTES
"Subjective     Abigail Harvey is a 43 year old female who presents to clinic today for the following health issues:     HPI   Asthma Follow-Up    Was ACT completed today?    Yes    ACT Total Scores 7/10/2019   ACT TOTAL SCORE (Goal Greater than or Equal to 20) 22   In the past 12 months, how many times did you visit the emergency room for your asthma without being admitted to the hospital? 0   In the past 12 months, how many times were you hospitalized overnight because of your asthma? 0        How many days per week do you miss taking your asthma controller medication?  0    Please describe any recent triggers for your asthma: smoke allergies      Have you had any Emergency Room Visits, Urgent Care Visits, or Hospital Admissions since your last office visit?  No        Amount of exercise or physical activity: 6-7 days/week for an average of 30-45 minutes    Problems taking medications regularly: No    Medication side effects: none    Diet: regular (no restrictions)      Doing well with birth control, needs refills.         Reviewed and updated as needed this visit by Provider         Review of Systems   ROS COMP: Constitutional, HEENT, cardiovascular, pulmonary, gi and gu systems are negative, except as otherwise noted.      Objective    There were no vitals taken for this visit.  There is no height or weight on file to calculate BMI.   /89   Pulse 67   Ht 1.753 m (5' 9\")   Wt 78.7 kg (173 lb 8 oz)   LMP  (LMP Unknown)   SpO2 100%   BMI 25.62 kg/m    Physical Exam   GENERAL: healthy, alert and no distress  EYES: Eyes grossly normal to inspection  HENT: nose and mouth without ulcers or lesions  PSYCH: mentation appears normal, affect normal/bright    Diagnostic Test Results:  none         Assessment & Plan     1. Excessive or frequent menstruation  - stable, refilled script   - levonorgestrel-ethinyl estradiol (SEASONALE) 0.15-0.03 MG tablet; Take 1 tablet by mouth daily  Dispense: 84 tablet; Refill: " 3    2. Mild persistent asthma without complication  - stable, refilled script  ACT Total Scores 5/18/2018 9/13/2018 7/10/2019   ACT TOTAL SCORE (Goal Greater than or Equal to 20) 19 23 22   In the past 12 months, how many times did you visit the emergency room for your asthma without being admitted to the hospital? 0 0 0   In the past 12 months, how many times were you hospitalized overnight because of your asthma? 0 0 0     - albuterol (PROAIR HFA/PROVENTIL HFA/VENTOLIN HFA) 108 (90 Base) MCG/ACT inhaler; Inhale 2 puffs into the lungs every 6 hours as needed for shortness of breath / dyspnea or wheezing  Dispense: 1 Inhaler; Refill: 3  - fluticasone-salmeterol (ADVAIR-HFA) 115-21 MCG/ACT inhaler; Inhale 2 puffs into the lungs 2 times daily  Dispense: 36 g; Refill: 3      Deqa Isaura Packer MD  [facility]      Answers for HPI/ROS submitted by the patient on 7/10/2019   If you checked off any problems, how difficult have these problems made it for you to do your work, take care of things at home, or get along with other people?: Not difficult at all  PHQ9 TOTAL SCORE: 0  WILLIAMS 7 TOTAL SCORE: 2

## 2019-07-11 ASSESSMENT — ANXIETY QUESTIONNAIRES: GAD7 TOTAL SCORE: 2

## 2019-07-11 ASSESSMENT — ASTHMA QUESTIONNAIRES: ACT_TOTALSCORE: 22

## 2019-08-23 DIAGNOSIS — N92.0 EXCESSIVE OR FREQUENT MENSTRUATION: ICD-10-CM

## 2019-08-23 NOTE — TELEPHONE ENCOUNTER
"Requested Prescriptions   Pending Prescriptions Disp Refills     levonorgestrel-ethinyl estradiol (SEASONALE) 0.15-0.03 MG tablet [Pharmacy Med Name: LEVONOR-ETH ESTRAD 0.15-0.03]  Last Written Prescription Date:  7/10/2019  Last Fill Quantity: 84 tabs,  # refills: 3   Last office visit: 7/10/2019 with prescribing provider:  Ochoa   Future Office Visit:     91 tablet 0     Sig: TAKE 1 TABLET BY MOUTH EVERY DAY       Contraceptives Protocol Passed - 8/23/2019  4:31 PM        Passed - Patient is not a current smoker if age is 35 or older        Passed - Recent (12 mo) or future (30 days) visit within the authorizing provider's specialty     Patient had office visit in the last 12 months or has a visit in the next 30 days with authorizing provider or within the authorizing provider's specialty.  See \"Patient Info\" tab in inbasket, or \"Choose Columns\" in Meds & Orders section of the refill encounter.              Passed - Medication is active on med list        Passed - No active pregnancy on record        Passed - No positive pregnancy test in past 12 months          "

## 2019-08-24 RX ORDER — LEVONORGESTREL AND ETHINYL ESTRADIOL 0.15-0.03
KIT ORAL
Qty: 0.1 TABLET | Refills: 0 | OUTPATIENT
Start: 2019-08-24

## 2019-08-24 NOTE — TELEPHONE ENCOUNTER
Refused Prescriptions:                       Disp   Refills    levonorgestrel-ethinyl estradiol (SEASONAL*0.1 ta*0        Sig: TAKE 1 TABLET BY MOUTH EVERY DAY  Refused By: PEREZ RODAS  Reason for Refusal: Duplicate

## 2019-09-23 ENCOUNTER — NURSE TRIAGE (OUTPATIENT)
Dept: NURSING | Facility: CLINIC | Age: 44
End: 2019-09-23

## 2019-09-24 NOTE — TELEPHONE ENCOUNTER
"Pt calls in with concern of rectal bleeding   Started this AM    Has had BM's x 4 today and each time there has been blood >  Blood streaks on toilet bowl and on tissue   No clots     Hx of hemorrhoids while being pregnant     Pt with no other symptoms     Per protocol pt advised to be seen with 2-3 days     Pt transferred to scheduling     Appointment made for this Wednesday at 1:20 pm with [facility]    Protocol and care advice reviewed  Caller states understanding of the recommended disposition  Advised to call back if further questions or concerns    Farrukh Rachel RN / [facility]    Reason for Disposition    MILD rectal bleeding (more than just a few drops or streaks)    Additional Information    Negative: Shock suspected (e.g., cold/pale/clammy skin, too weak to stand, low BP, rapid pulse)    Negative: Difficult to awaken or acting confused (e.g., disoriented, slurred speech)    Negative: Passed out (i.e., lost consciousness, collapsed and was not responding)    Negative: [1] Vomiting AND [2] contains red blood or black (\"coffee ground\") material  (Exception: few red streaks in vomit that only happened once)    Negative: Sounds like a life-threatening emergency to the triager    Negative: [1] MODERATE rectal bleeding (small blood clots, passing blood without stool, or toilet water turns red) AND [2] more than once a day    Negative: [1] Constant abdominal pain AND [2] present > 2 hours    Negative: Taking Coumadin (warfarin) or other strong blood thinner, or known bleeding disorder (e.g., thrombocytopenia)    Negative: Known cirrhosis of the liver (or history of liver failure or ascites)    Negative: MODERATE rectal bleeding (small blood clots, passing blood without stool, or toilet water turns red)    Negative: SEVERE rectal bleeding (large blood clots; on and off, or constant bleeding)    Negative: SEVERE dizziness (e.g., unable to stand, requires support to walk, feels like passing " out now)    Negative: Pale skin (pallor) of new onset or worsening    Negative: Tarry or jet black-colored stool (not dark green)    Negative: Rectal foreign body (i.e., now or within past week;  inserted or swallowed)    Negative: High-risk adult (e.g., prior surgery on aorta, abdominal aortic aneurysm)    Negative: [1] Colonoscopy AND [2] in past 72 hours    Negative: Patient sounds very sick or weak to the triager    Protocols used: RECTAL BLEEDING-A-AH

## 2019-11-07 DIAGNOSIS — N92.0 EXCESSIVE OR FREQUENT MENSTRUATION: ICD-10-CM

## 2019-11-07 NOTE — TELEPHONE ENCOUNTER
Reason for Call:  Medication or medication refill:    Do you use a [facility]?  Name of the pharmacy and phone number for the current request:  [id number] [place] [facility] [place], [place] - [address] [place] [address]    Name of the medication requested: levonorgestrel-ethinyl estradiol (SEASONALE) 0.15-0.03 MG tablet    Other request: Patient called to request a refill on this medication. Informed patient that this was filled on 7/10/2019 with 3 refills. Patient states she never picked this up as she still had a previous RX to finish. Called the pharmacy to give a verbal okay. The pharmacist states she has been getting 91 tablets / refill. PCP sent 84 tablets on 7/10/2019. Pharmacist instructed writer to verify amount. Please assist / refill it as 91 tablets. Thanks!    Can we leave a detailed message on this number? YES    Phone number patient can be reached at: [phone]    Best Time: Any    Call taken on 11/7/2019 at 10:31 AM by Carmen Hu

## 2019-11-07 NOTE — TELEPHONE ENCOUNTER
"Requested Prescriptions   Pending Prescriptions Disp Refills     levonorgestrel-ethinyl estradiol (SEASONALE) 0.15-0.03 MG tablet 84 tablet 3     Sig: Take 1 tablet by mouth daily  Last Written Prescription Date:  7/10/2019  Last Fill Quantity: 84 tablet,  # refills: 3   Last Office Visit: 7/10/2019   Future Office Visit:            Contraceptives Protocol Passed - 11/7/2019 11:30 AM        Passed - Patient is not a current smoker if age is 35 or older        Passed - Recent (12 mo) or future (30 days) visit within the authorizing provider's specialty     Patient has had an office visit with the authorizing provider or a provider within the authorizing providers department within the previous 12 mos or has a future within next 30 days. See \"Patient Info\" tab in inbasket, or \"Choose Columns\" in Meds & Orders section of the refill encounter.            Passed - Medication is active on med list        Passed - No active pregnancy on record        Passed - No positive pregnancy test in past 12 months          "

## 2019-11-10 RX ORDER — LEVONORGESTREL AND ETHINYL ESTRADIOL 0.15-0.03
1 KIT ORAL DAILY
Qty: 84 TABLET | Refills: 3 | OUTPATIENT
Start: 2019-11-10

## 2019-11-10 NOTE — TELEPHONE ENCOUNTER
Message sent to pharmacy - Refusal reason: Patient has requested refill too soon (SEE ORDER SENT 7/10/19 FOR ONE YEAR SUPPLY.).  Maged MENDIOLA

## 2019-11-11 DIAGNOSIS — N92.0 EXCESSIVE OR FREQUENT MENSTRUATION: ICD-10-CM

## 2019-11-11 RX ORDER — LEVONORGESTREL AND ETHINYL ESTRADIOL 0.15-0.03
KIT ORAL
Qty: 91 TABLET | Refills: 1 | Status: SHIPPED | OUTPATIENT
Start: 2019-11-11 | End: 2019-11-12

## 2019-11-11 RX ORDER — LEVONORGESTREL AND ETHINYL ESTRADIOL 0.15-0.03
1 KIT ORAL DAILY
Qty: 91 TABLET | Refills: 1 | Status: SHIPPED | OUTPATIENT
Start: 2019-11-11 | End: 2019-11-12

## 2019-11-11 NOTE — TELEPHONE ENCOUNTER
Dr. Packer/Covering providers-  1. Please sign if agree  2. Patient's pharmacy reports patient filling medication for dispense of #91, but order is for dispense #84  3. Triage RN cannot authorize larger dispense to order-routing to covering providers as patient reports out of medication    Thank you!  BONIFACIO Payne, MICHELLEN, RN

## 2019-11-12 DIAGNOSIS — N92.0 EXCESSIVE OR FREQUENT MENSTRUATION: ICD-10-CM

## 2019-11-12 RX ORDER — LEVONORGESTREL AND ETHINYL ESTRADIOL 0.15-0.03
1 KIT ORAL DAILY
Qty: 91 TABLET | Refills: 2 | Status: SHIPPED | OUTPATIENT
Start: 2019-11-12 | End: 2020-03-06

## 2019-11-12 NOTE — TELEPHONE ENCOUNTER
PT needs more than one refill. She would run out in May if she just got one refill. She was seen for annual visit in July. Her insurance won't pay for physical sooner than next July. Therefore new rx for BCP sent for 90 with 2 refills . She is aware she is to be seen next July.   MD had intended at last visit on 7/10/19 to give for one year but for some reason this did not get sent to pharmacy then.     Lisa Villanueva, RN, BSN

## 2019-11-19 ENCOUNTER — TELEPHONE (OUTPATIENT)
Dept: FAMILY MEDICINE | Facility: CLINIC | Age: 44
End: 2019-11-19

## 2019-11-19 ENCOUNTER — OFFICE VISIT (OUTPATIENT)
Dept: FAMILY MEDICINE | Facility: CLINIC | Age: 44
End: 2019-11-19
Payer: COMMERCIAL

## 2019-11-19 ENCOUNTER — ANCILLARY PROCEDURE (OUTPATIENT)
Dept: GENERAL RADIOLOGY | Facility: CLINIC | Age: 44
End: 2019-11-19
Attending: FAMILY MEDICINE
Payer: COMMERCIAL

## 2019-11-19 VITALS
WEIGHT: 174.25 LBS | SYSTOLIC BLOOD PRESSURE: 120 MMHG | HEART RATE: 68 BPM | DIASTOLIC BLOOD PRESSURE: 80 MMHG | TEMPERATURE: 98.8 F | OXYGEN SATURATION: 98 % | BODY MASS INDEX: 25.73 KG/M2 | RESPIRATION RATE: 16 BRPM

## 2019-11-19 DIAGNOSIS — J45.31 MILD PERSISTENT ASTHMA WITH ACUTE EXACERBATION: ICD-10-CM

## 2019-11-19 DIAGNOSIS — R05.9 COUGH: Primary | ICD-10-CM

## 2019-11-19 DIAGNOSIS — R07.89 CHEST TIGHTNESS: ICD-10-CM

## 2019-11-19 DIAGNOSIS — Z78.9 USES BIRTH CONTROL: ICD-10-CM

## 2019-11-19 DIAGNOSIS — R05.9 COUGH: ICD-10-CM

## 2019-11-19 DIAGNOSIS — Z87.09 HISTORY OF LARYNGITIS: ICD-10-CM

## 2019-11-19 PROCEDURE — 87798 DETECT AGENT NOS DNA AMP: CPT | Performed by: FAMILY MEDICINE

## 2019-11-19 PROCEDURE — 99214 OFFICE O/P EST MOD 30 MIN: CPT | Performed by: FAMILY MEDICINE

## 2019-11-19 PROCEDURE — 71046 X-RAY EXAM CHEST 2 VIEWS: CPT

## 2019-11-19 RX ORDER — IPRATROPIUM BROMIDE 42 UG/1
2 SPRAY, METERED NASAL 4 TIMES DAILY
Qty: 7 ML | Refills: 0 | Status: SHIPPED | OUTPATIENT
Start: 2019-11-19 | End: 2019-11-29

## 2019-11-19 RX ORDER — NAPROXEN 500 MG/1
500 TABLET ORAL 2 TIMES DAILY WITH MEALS
Qty: 10 TABLET | Refills: 0 | Status: CANCELLED | OUTPATIENT
Start: 2019-11-19 | End: 2019-11-24

## 2019-11-19 RX ORDER — PREDNISONE 20 MG/1
40 TABLET ORAL DAILY
Qty: 10 TABLET | Refills: 0 | Status: CANCELLED | OUTPATIENT
Start: 2019-11-19 | End: 2019-11-24

## 2019-11-19 RX ORDER — BENZONATATE 200 MG/1
200 CAPSULE ORAL 3 TIMES DAILY PRN
Qty: 21 CAPSULE | Refills: 0 | Status: SHIPPED | OUTPATIENT
Start: 2019-11-19 | End: 2019-11-26

## 2019-11-19 NOTE — LETTER
November 20, 2019      Abigail FABRICIO Wes  [address]  [facility] [address]        Dear MsRae,    We are writing to inform you of your test results.    Results within acceptable limits.     Resulted Orders   XR Chest 2 Views    Narrative    CHEST TWO VIEWS  11/19/2019 2:02 PM     HISTORY: 43-year-old woman with cough, evaluate for pneumonia.       Impression    IMPRESSION: Since July 21, 2015, heart size remains normal. No pleural  effusion, pneumothorax, or abnormal area of consolidation.    JUDIE MOHAN MD       If you have any questions or concerns, please call the clinic at the number listed above.       Sincerely,    Beryl Patel MD/nr

## 2019-11-19 NOTE — PATIENT INSTRUCTIONS
Still think its viral uri with some asthma exacerbation but can check nose for whooping cough and do a cxr to rule out pneumonia  No antibiotic needed but can treat symptoms unless cxr shows something  Tessalon up to 3 times a day for cough  Ipratropium in nose 2 puffs 4 times a day to help drainage triggering cough  Schedule albuterol 2 puffs every 6 hrs next 1 week then wean off as gets better  Can consider low dose prednisone after that if not better   Continue use of Advair twice a day   See your asthma/ allergy doctor if not better  Contact your allergist about inhalers, if having an issue can call us to refill  Go to the ER if worse ( sort of breath, chest pain, high fever etc)  Asthma action plan given  Flu shot yearly and yours is utd  Continue care with PCP Dr Steffanie salinas for a physical with pap in 2020.  Return in 2 weeks for a recheck of asthma with your primary I will can do it on the phone.      For symptom relief I suggest tryin. Steam.  Take a long, hot shower.  Or if you don't want to get in the shower just run it with the bathroom door shut for a few minutes and breathe the steam.  2. Drink hot liquids frequently such as tea or hot water with honey and lemon.  3. Acetaminophen (Tylenol) and ibuprofen (Motrin or Advil) as needed for headache, sore throat, body aches, or fever.  4. For loosening phlegm and sputum try guaifenesin (available in many combination products and alone as plain Robitussin or plain Mucinex) and for cough suppression you can try dextromethorphan (Delsym or combined in other products).  5. For nasal congestion try:    An oral decongestant.  The only decongestant I recommend is pseudoephedrine. Ask the pharmacist for the over the counter (but real) pseudoephedrine - not phenylephrine.  This can raise your blood pressure and heart rate so do not use this if you have hypertension.       Afrin spray for 3 days.  (Never use afrin nasal spray for more than 3 days as there  is a risk of developing tolerance and rebound/worsening nasal congestion if used longer than this.)    Nealmed sinus rinses.    Nasal steroid spray such as nasacort or flonase, which are over-the-counter.  6. And most importantly: plenty of rest and sleep  especially while you have a fever.     Stop smoking and avoid secondhand smoke    Drink lots of fluids such as water and clear soups. Fluids help loosen mucus. Fluids are also important because they help prevent dehydration.    Gargle with warm salt water a few times a day to relieve a sore throat. Throat sprays or lozenges may also help relieve the pain.    Avoid alcohol.    Use saline (salt water) nose drops to help loosen mucus and moisten the tender skin in your nose.

## 2019-11-19 NOTE — TELEPHONE ENCOUNTER
Called pt and lvm to call back.    Please let her know her chest xray is normal.    Then can close encounter per Dr Jorge Lozano, CMA

## 2019-11-19 NOTE — TELEPHONE ENCOUNTER
"Patient called back and stated:  1. Chest tightness, not chest pain   A. Three weeks duration   B. Intermittent \"rattling\" in chest and wheezing, neither symptom noted today  2. Using albuterol inhaler-has history of asthma   A. Gasps when laying down due to URI  3. Denied SOB/difficulty breathing    Writer confirmed appt     Dr. Jorge Payne, BSN, RN    "

## 2019-11-19 NOTE — LETTER
My Asthma Action Plan    Name: Abigail Harvey   YOB: 1975  Date: 11/19/2019   My doctor: Beryl Patel MD   [facility]        My Control Medicine: Fluticasone propionate + salmeterol (Advair HFA) -  115/21 mcg 2 puffs twice a day  My Rescue Medicine: Albuterol (Proair/Ventolin/Proventil HFA) 2-4 puffs EVERY 4 HOURS as needed. Use a spacer if recommended by your provider.   My Asthma Severity:   Moderate Persistent  Know your asthma triggers: upper respiratory infections and cold air               GREEN ZONE   Good Control    I feel good    No cough or wheeze    Can work, sleep and play without asthma symptoms       Take your asthma control medicine every day.     1. If exercise triggers your asthma, take your rescue medication    15 minutes before exercise or sports, and    During exercise if you have asthma symptoms  2. Spacer to use with inhaler: If you have a spacer, make sure to use it with your inhaler             YELLOW ZONE Getting Worse  I have ANY of these:    I do not feel good    Cough or wheeze    Chest feels tight    Wake up at night   1. Keep taking your Green Zone medications  2. Start taking your rescue medicine:    every 20 minutes for up to 1 hour. Then every 4 hours for 24-48 hours.  3. If you stay in the Yellow Zone for more than 12-24 hours, contact your doctor.  4. If you do not return to the Green Zone in 12-24 hours or you get worse, start taking your oral steroid medicine if prescribed by your provider.           RED ZONE Medical Alert - Get Help  I have ANY of these:    I feel awful    Medicine is not helping    Breathing getting harder    Trouble walking or talking    Nose opens wide to breathe       1. Take your rescue medicine NOW  2. If your provider has prescribed an oral steroid medicine, start taking it NOW  3. Call your doctor NOW  4. If you are still in the Red Zone after 20 minutes and you have not reached your doctor:    Take your rescue  medicine again and    Call 911 or go to the emergency room right away    See your regular doctor within 2 weeks of an Emergency Room or Urgent Care visit for follow-up treatment.          Annual Reminders:  Meet with Asthma Educator,  Flu Shot in the Fall, consider Pneumonia Vaccination for patients with asthma (aged 19 and older).    Pharmacy:    [id number] [place] [place] [facility] [address] [id number] - [place] - [address] [facility] [place]                          Asthma Triggers  How To Control Things That Make Your Asthma Worse    Triggers are things that make your asthma worse.  Look at the list below to help you find your triggers and what you can do about them.  You can help prevent asthma flare-ups by staying away from your triggers.      Trigger                                                          What you can do   Cigarette Smoke  Tobacco smoke can make asthma worse. Do not allow smoking in your home, car or around you.  Be sure no one smokes at a child s day care or school.  If you smoke, ask your health care provider for ways to help you quit.  Ask family members to quit too.  Ask your health care provider for a referral to Quit Plan to help you quit smoking, or call 2-066-468-PLAN.     Colds, Flu, Bronchitis  These are common triggers of asthma. Wash your hands often.  Don t touch your eyes, nose or mouth.  Get a flu shot every year.     Dust Mites  These are tiny bugs that live in cloth or carpet. They are too small to see. Wash sheets and blankets in hot water every week.   Encase pillows and mattress in dust mite proof covers.  Avoid having carpet if you can. If you have carpet, vacuum weekly.   Use a dust mask and HEPA vacuum.   Pollen and Outdoor Mold  Some people are allergic to trees, grass, or weed pollen, or molds. Try to keep your windows closed.  Limit time out doors when pollen count is high.   Ask you health care  provider about taking medicine during allergy season.     Animal Dander  Some people are allergic to skin flakes, urine or saliva from pets with fur or feathers. Keep pets with fur or feathers out of your home.    If you can t keep the pet outdoors, then keep the pet out of your bedroom.  Keep the bedroom door closed.  Keep pets off cloth furniture and away from stuffed toys.     Mice, Rats, and Cockroaches   Some people are allergic to the waste from these pests.   Cover food and garbage.  Clean up spills and food crumbs.  Store grease in the refrigerator.   Keep food out of the bedroom.   Indoor Mold  This can be a trigger if your home has high moisture. Fix leaking faucets, pipes, or other sources of water.   Clean moldy surfaces.  Dehumidify basement if it is damp and smelly.   Smoke, Strong Odors, and Sprays  These can reduce air quality. Stay away from strong odors and sprays, such as perfume, powder, hair spray, paints, smoke incense, paint, cleaning products, candles and new carpet.   Exercise or Sports  Some people with asthma have this trigger. Be active!  Ask your doctor about taking medicine before sports or exercise to prevent symptoms.    Warm up for 5-10 minutes before and after sports or exercise.     Other Triggers of Asthma  Cold air:  Cover your nose and mouth with a scarf.  Sometimes laughing or crying can be a trigger.  Some medicines and food can trigger asthma.

## 2019-11-19 NOTE — TELEPHONE ENCOUNTER
"Dr. Patel asked RN to contact patient to triage for today's appointment: \"Ongoing cough x 3 weeks, chest pain\"    Left message to call back and ask to speak with an available triage nurse.    MICHELLE ReedN, RN    "

## 2019-11-20 ENCOUNTER — TELEPHONE (OUTPATIENT)
Dept: NURSING | Facility: CLINIC | Age: 44
End: 2019-11-20

## 2019-11-20 LAB
B PARAPERT DNA SPEC QL NAA+PROBE: NOT DETECTED
B PERT DNA SPEC QL NAA+PROBE: NOT DETECTED
BORDETELLA COMMENT: NORMAL

## 2019-11-20 ASSESSMENT — ASTHMA QUESTIONNAIRES: ACT_TOTALSCORE: 16

## 2019-11-21 NOTE — TELEPHONE ENCOUNTER
Related message from PCP about negative CXR. No other questions. Call back for further questions or problems.  Katie Almonte RN  [facility]

## 2019-12-06 ENCOUNTER — TELEPHONE (OUTPATIENT)
Dept: FAMILY MEDICINE | Facility: CLINIC | Age: 44
End: 2019-12-06

## 2019-12-06 DIAGNOSIS — H53.9 VISION ABNORMALITIES: Primary | ICD-10-CM

## 2019-12-06 NOTE — TELEPHONE ENCOUNTER
Reason for call:  Order   Order or referral being requested: eye doctor  Reason for request: pt called saying her insurance told her she needs a referral for a eye doctor for anywhere in [place]   Date needed: as soon as possible  Has the patient been seen by the PCP for this problem? YES    Additional comments: pt wants a call back if she cant get on or can     Phone number to reach patient:  Home number on file [phone] (home)    Best Time:  any    Can we leave a detailed message on this number?  YES

## 2019-12-06 NOTE — TELEPHONE ENCOUNTER
Dr Patel has signed the referral.    Pt notified.  Referral coordinator Anna Gentile's remarks were relayed to pt  She will go elsewhere in network for follow up    Pt has asked that we fax the referral to [facility]. She will call us back with the fax number at [facility]    Referral is on MARLENA Pringle's desk    Lisa Villanueva RN, BSN

## 2019-12-06 NOTE — TELEPHONE ENCOUNTER
Team Coordinators: can you please fax referral to [facility] as patient requested? Should be on Yary's desk    Thank You!  Cindy Rodríguez, RN  Triage Nurse

## 2019-12-06 NOTE — TELEPHONE ENCOUNTER
"Referrals:  Received call from patient. Patient states she was seen at [facility] on 12/05/2019. She had routine eye exam. She has been to this clinic in the past.     She received call from them today stating they need a referral from PCP -- referral would need to be back dated    Patient states they kept saying \"get a referral in case this goes medical\".    Please advise    Thank You!  Cindy Rodríguez RN  Triage Nurse    "

## 2019-12-06 NOTE — TELEPHONE ENCOUNTER
Patient has [facility] assigned to the [facility] network. She can get 1 routine eye exam a year with anyone she chooses but if she has an problem, she needs to go to an eye dr in our network. It is up to her PCP to approve this retro referral but if she has to recheck in 6 months, she should be redirected to an in-network eye doctor. (Anna in referrals covering for Mary Alegre)

## 2019-12-06 NOTE — TELEPHONE ENCOUNTER
Is this just for standard yearly visual acuity exam or does she have problem?    Attempted to reach, unable. Left message  to call back.  Lisa Villanueva RN

## 2019-12-12 DIAGNOSIS — R05.9 COUGH: ICD-10-CM

## 2019-12-12 NOTE — TELEPHONE ENCOUNTER
IPRATROPIUM 0.06% SPRAY      Last Written Prescription Date:  11/19/2019  Last Fill Quantity: 7ml,   # refills: 0  Last Office Visit: 11/19/2019  Future Office visit:       Routing refill request to provider for review/approval because:  Drug not on the [facility], [facility] or [facility] refill protocol or controlled substance

## 2019-12-14 RX ORDER — IPRATROPIUM BROMIDE 42 UG/1
2 SPRAY, METERED NASAL 4 TIMES DAILY
Refills: 0 | OUTPATIENT
Start: 2019-12-14 | End: 2019-12-24

## 2019-12-15 NOTE — TELEPHONE ENCOUNTER
Office visit 11/19    Cough R05 XR Chest 2 Views        benzonatate (TESSALON) 200 MG capsule       ipratropium (ATROVENT) 0.06 % nasal spray       B. pertussis/parapertussis PCR-NP

## 2019-12-17 DIAGNOSIS — R05.9 COUGH: ICD-10-CM

## 2019-12-17 NOTE — TELEPHONE ENCOUNTER
IPRATROPIUM 0.06% SPRAY      Last Written Prescription Date:  11/19/2019  Last Fill Quantity: 7ml,   # refills: 0  Last Office Visit: 11/19/2019  Future Office visit:       Routing refill request to provider for review/approval because:  Drug not on the [facility], [facility] or [facility] refill protocol or controlled substance

## 2019-12-18 NOTE — TELEPHONE ENCOUNTER
Dr. Patel,  Routing refill request to provider for review/approval because:  Drug not on the [facility] refill protocol     Writer notes Rx sent 11/19/2019 at office visit. Is medication meant to be continued long term?    Thank You!  Cindy Rodríguez, RN  Triage Nurse

## 2019-12-18 NOTE — TELEPHONE ENCOUNTER
Ipratropium nasal spray not a long term med. Given for time when she had a cold. Please clarify with patient

## 2019-12-19 NOTE — TELEPHONE ENCOUNTER
Left message to call back and ask to speak with an available triage nurse.    There was another refill request for this medication-see 12/12/19 refill encounter.  MICHELLE ReedN, RN

## 2019-12-27 RX ORDER — IPRATROPIUM BROMIDE 42 UG/1
2 SPRAY, METERED NASAL 4 TIMES DAILY
Qty: 15 ML | Refills: 0 | OUTPATIENT
Start: 2019-12-27 | End: 2020-01-06

## 2019-12-27 NOTE — TELEPHONE ENCOUNTER
Please clarify Atrovent was given just for an acute upper respiratory infection.  It was not meant for chronic refills.

## 2019-12-27 NOTE — TELEPHONE ENCOUNTER
Routing refill request to provider for review/approval because:  Unable to reach patient at this time - do you want further clarification?

## 2019-12-27 NOTE — TELEPHONE ENCOUNTER
Left message on answering machine for patient to call clinic triage about this med question.  MARLENA De La Paz

## 2019-12-27 NOTE — TELEPHONE ENCOUNTER
PT called back.  PT said her pharmacy did an auto refill and pt didn't recognize the med.  PT is not needing the med.  Refused me to pharmacy with comment.  MARLENA De La Paz

## 2020-01-13 ENCOUNTER — TELEPHONE (OUTPATIENT)
Dept: FAMILY MEDICINE | Facility: CLINIC | Age: 45
End: 2020-01-13

## 2020-01-13 NOTE — TELEPHONE ENCOUNTER
Panel Management Review      Patient has the following on her problem list:     Asthma review     ACT Total Scores 11/19/2019   ACT TOTAL SCORE (Goal Greater than or Equal to 20) 16   In the past 12 months, how many times did you visit the emergency room for your asthma without being admitted to the hospital? 0   In the past 12 months, how many times were you hospitalized overnight because of your asthma? 0      1. Is Asthma diagnosis on the Problem List? Yes    2. Is Asthma listed on Health Maintenance? No   3. Patient is due for:  ACT      Composite cancer screening  Chart review shows that this patient is due/due soon for the following None  Summary:    Patient is due/failing the following:   ACT-failed her last ACT need to update    Action needed:   Patient needs nurse only appointment.    Type of outreach:    Phone, left message for patient to call back.     Questions for provider review:    None                                                                                                                                    Odessa Fong MA        Chart routed to Care Team .

## 2020-01-15 ENCOUNTER — TELEPHONE (OUTPATIENT)
Dept: FAMILY MEDICINE | Facility: CLINIC | Age: 45
End: 2020-01-15

## 2020-01-15 NOTE — TELEPHONE ENCOUNTER
Panel Management Review      Patient has the following on her problem list:     Asthma review     ACT Total Scores 1/15/2020   ACT TOTAL SCORE (Goal Greater than or Equal to 20) 25   In the past 12 months, how many times did you visit the emergency room for your asthma without being admitted to the hospital? 0   In the past 12 months, how many times were you hospitalized overnight because of your asthma? 0      1. Is Asthma diagnosis on the Problem List? Yes    2. Is Asthma listed on Health Maintenance? Yes    3. Patient is due for:  ACT      Composite cancer screening  Chart review shows that this patient is due/due soon for the following None  Summary:    Patient is due/failing the following:   ACT    Action needed:   Patient needs to do ACT.    Type of outreach:    Phone, spoke to patient.  did ACT    Questions for provider review:    None                                                                                                                                    Molly Lozano CMA       Chart routed to Care Team .

## 2020-01-16 ASSESSMENT — ASTHMA QUESTIONNAIRES: ACT_TOTALSCORE: 25

## 2020-03-03 ENCOUNTER — TELEPHONE (OUTPATIENT)
Dept: FAMILY MEDICINE | Facility: CLINIC | Age: 45
End: 2020-03-03

## 2020-03-03 DIAGNOSIS — N92.0 EXCESSIVE OR FREQUENT MENSTRUATION: Primary | ICD-10-CM

## 2020-03-03 NOTE — TELEPHONE ENCOUNTER
Lab test may be not conclusive and expensive I recommend she see Gyn to assess what, if &  any lab may be recommended and may be Gyn can suggest a better option of birth control or continue on current if they considered safe given her age.  A progesterone only medication might be a good option.

## 2020-03-03 NOTE — TELEPHONE ENCOUNTER
Dr. Patel-Please review and advise if this lab testing is something you would order, or if you recommend patient follow up with OB/GYN?    Thank you!  BONIFACIO Payne, MICHELLEN, RN

## 2020-03-03 NOTE — TELEPHONE ENCOUNTER
The patient was advised by her PCP that it is not recommended to take levonorgestrel-ethinyl estradiol (SEASONALE) 0.15-0.03 MG tablet after the age of 45. The patient is requesting a lab test to perhaps determine if she is premenopausal. Please follow up with the patient.

## 2020-03-03 NOTE — TELEPHONE ENCOUNTER
"Patient called back and writer started to review message per Dr. Patel.    Patient interrupted writer and stated:  1. She is not willing to stop current OCP as it would cause her to need to change her job/be absent several days per month due to amount of bleeding with menstrual cycle  2. Does not understand why this medication would no longer be recommended-Dr. Patel reviewed reasons against continuing this medication beyond age 45 but \"other medical providers\" have informed her it is actually safe and okay to continue this type of OCP beyond age 45 as long as she is not in menopause    Patient requested OB/GYN referral.  Ordered.    Discussed with patient two OB/GYN providers, Dr. Ayesha Gregg and Dr. Genoveva Ash see patients here at the [place] location on most Wednesdays and Fridays.  Patient stated [place] location would be convenient.    Patient's call transferred to , FREDO Bear, to assist patient in scheduling with Dr. Gregg or Dr. Ash.  MICHELLE ReedN, RN    "

## 2020-03-03 NOTE — TELEPHONE ENCOUNTER
Left message on machine to call back.  Ask to speak to any triage nurse, let them know it's a return call.    Leave a number and time that you can be reached.   Iveth Batista RN

## 2020-03-04 NOTE — PROGRESS NOTES
GYN Clinic Visit    Date of visit: 3/6/2020     Chief Complaint:   Chief Complaint   Patient presents with     Contraception     refill       HPI:   Abigail Harvey is a 44 year old  who presents to clinic today discuss the birth control refill in consultation from Dr. Beryl Patel.    Used to have very painful periods before kids.  After having children, they've been heavy.  Never had an ultrasound or other evaluation for her periods.  Has been on a few different birth control pills before, and this 1 seems to work the best for her.  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  This is just not sustainable for her and she is wondering why a refill would not be safe.  Not sure what her other options would be, and she is very distressed at the start of her visit.    Mom had hyst at 52 due to prolapse, was menstruating then.  Sister 55, still menstruating.  Concerned that she has many years out of her before menopause and cannot deal with such heavy periods for the next 10 years.    Medications:  albuterol (PROAIR HFA/PROVENTIL HFA/VENTOLIN HFA) 108 (90 Base) MCG/ACT inhaler, Inhale 2 puffs into the lungs every 6 hours as needed for shortness of breath / dyspnea or wheezing  Fexofenadine HCl (ALLEGRA PO),   fluticasone-salmeterol (ADVAIR-HFA) 115-21 MCG/ACT inhaler, Inhale 2 puffs into the lungs 2 times daily  [] benzonatate (TESSALON) 200 MG capsule, Take 1 capsule (200 mg) by mouth 3 times daily as needed for cough  [] ipratropium (ATROVENT) 0.06 % nasal spray, Spray 2 sprays into both nostrils 4 times daily for 10 days    No current facility-administered medications on file prior to visit.       Allergy:  No Known Allergies  Patient denies food, latex or environmental allergies.     Obstetric History:   OB History    Para Term  AB Living   3 2 2 0 1 2   SAB TAB Ectopic Multiple Live Births   1 0 0 0 2       # Outcome Date GA Lbr Jos/2nd Weight Sex Delivery Anes PTL Lv   3 SAB            2 Term      CS-LTranv   MADELIN   1 Term      CS-LTranv   MADELIN     Gynecologic History:  Menses: occur every 28-30 days lasting 7 days in duration.   No LMP recorded. (Menstrual status: Birth Control).  Last Pap: 2015, NIL and HPV neg  History of abnormal pap: neg  Contraceptive History: oral contraceptives  The patient is sexually active with male partner.     Past Medical History:   Diagnosis Date     Injury of left thumb, initial encounter 2017     Lyme disease 2018     Thumb pain, left 3/23/2017       Past Surgical History:   Procedure Laterality Date      SECTION      x2     HC TOOTH EXTRACTION W/FORCEP         Social History:  Alcohol use  Social History    Substance and Sexual Activity      Alcohol use: Yes        Comment: occasional    Tobacco use  History   Smoking Status     Never Smoker   Smokeless Tobacco     Never Used     Drug use  History   Drug Use No     Sexual history  History   Sexual Activity     Sexual activity: Yes     Partners: Male     Birth control/ protection: Pill       Family History   Problem Relation Age of Onset     Heart Disease Father         heart attack     Cancer Father         lung     Hypertension Mother      Health screening:   Breast cancer screening: due  Colon cancer screening: n/a    Review of Systems:  Gen: No change in weight, no fever, no chills, no fatigue  Skin:  No abnormal lesions or rashes  Eyes: No visual blurring, no double vision, no photophobia  Ears/Nose/Throat: no hearing loss, no deafness  CV: no palpitations, no chest pain, no hypertension, no syncope  Resp: no shortness of breath, no cough, no wheezing, no asthma  GI: no nausea, no vomiting, no diarrhea, no constipation, no bloating, no GERD  :  no vaginal discharge, no dysuria, pos heavy bleeding, no pelvic pain   Endo: no thyroid problems, no cold/heat intolerance, no acne, no hirsutism, no  diabetes  Heme: no easy bruising or bleeding, no history of DVT/PE/CVA  Musculoskeletal: no joint pain or weakness, no muscular pain or weakness  Psychiatric: no depression, no anxiety  Neuro: no migraines with aura, no headaches, no seizures, no strokes, no focal deficits      Physical Exam:  Vitals:    20 0903   BP: (!) 135/92   BP Location: Left arm   Patient Position: Sitting   Cuff Size: Adult Regular   Pulse: 67   Weight: 79.8 kg (176 lb)       Gen: NAD, Awake and alert, cooperative with exam  HEENT: normocephalic, atraumatic. Anicteric sclerae. Moist mucus membranes without lesions or erythema.   Abd: soft, nontender, nondistended, no rebound, no guarding  Extremities: nontender, no edema  : normal appearing external genitalia, intact normal appearing vaginal mucosa without lesions or abnormal discharge; cervix appears smooth, WNL; pap smear obtained;   Psych: Appropriate mood and affect  Neuro:  Gait WNL.  Sensation and strength grossly intact  Skin:  No rashes or lesions      Assessment:  Abigail Harvey is a 44 year old  who presents with chief complaint   Chief Complaint   Patient presents with     Contraception     refill       Diagnosis:   1. Excessive or frequent menstruation  Patient has no contraindications to continuing an estrogen containing birth control method.  While her blood pressure was mildly elevated today.  I suspect that is largely due to her frustrations and having to call off work to attend this appointment.  Upon review of her vitals and flowsheets, she is previously been normotensive, including during the time when she was taking this medication regularly  Has never had an ultrasound to evaluate her heavy periods.  Discussed that this may be something to consider, as there could be a structural cause for her bleeding.  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  Also discussed Mirena IUD as an  alternative.  Compared the pros and cons of both Mirena and Seasonale, which she is currently on.  Ultimately, she decided to continue Seasonale, but will consider Mirena in the future.  Discussed she will need to have yearly visit with me for refills.  We will give her a year supply now, and can then provide refills next year to get her through to the summer when it will be easier for her to attend an appointment  - levonorgestrel-ethinyl estradiol (SEASONALE) 0.15-0.03 MG tablet; Take 1 tablet by mouth daily  Dispense: 91 tablet; Refill: 5    2. Cervical cancer screening  - Pap imaged thin layer screen with HPV - recommended age 30 - 65 years (select HPV order below)  - HPV High Risk Types DNA Cervical    3. Encounter for screening mammogram for breast cancer  - *MA Screening Digital Bilateral; Future      Health Maintenance:  1. Breast cancer screening: Discussed she is due for mammogram.  Order placed  2. Cervical Cancer Screening: A pap smear has been collected today.  The patient will be notified by phone if there are any abnormal results and follow up arranged in accordance with ASCCP guidelines .    Follow up: 1yr and prn.    Genoveva Ash MD

## 2020-03-06 ENCOUNTER — OFFICE VISIT (OUTPATIENT)
Dept: OBGYN | Facility: CLINIC | Age: 45
End: 2020-03-06
Payer: COMMERCIAL

## 2020-03-06 VITALS
DIASTOLIC BLOOD PRESSURE: 92 MMHG | HEART RATE: 67 BPM | BODY MASS INDEX: 25.99 KG/M2 | WEIGHT: 176 LBS | SYSTOLIC BLOOD PRESSURE: 135 MMHG

## 2020-03-06 DIAGNOSIS — N92.0 EXCESSIVE OR FREQUENT MENSTRUATION: Primary | ICD-10-CM

## 2020-03-06 DIAGNOSIS — Z12.31 ENCOUNTER FOR SCREENING MAMMOGRAM FOR BREAST CANCER: ICD-10-CM

## 2020-03-06 DIAGNOSIS — Z12.4 CERVICAL CANCER SCREENING: ICD-10-CM

## 2020-03-06 PROCEDURE — 87624 HPV HI-RISK TYP POOLED RSLT: CPT | Performed by: OBSTETRICS & GYNECOLOGY

## 2020-03-06 PROCEDURE — G0145 SCR C/V CYTO,THINLAYER,RESCR: HCPCS | Performed by: OBSTETRICS & GYNECOLOGY

## 2020-03-06 PROCEDURE — 99204 OFFICE O/P NEW MOD 45 MIN: CPT | Performed by: OBSTETRICS & GYNECOLOGY

## 2020-03-06 RX ORDER — LEVONORGESTREL AND ETHINYL ESTRADIOL 0.15-0.03
1 KIT ORAL DAILY
Qty: 91 TABLET | Refills: 5 | Status: SHIPPED | OUTPATIENT
Start: 2020-03-06 | End: 2021-05-18

## 2020-03-06 NOTE — NURSING NOTE
"Chief Complaint   Patient presents with     Contraception     refill       Initial BP (!) 135/92 (BP Location: Left arm, Patient Position: Sitting, Cuff Size: Adult Regular)   Pulse 67   Wt 79.8 kg (176 lb)   BMI 25.99 kg/m   Estimated body mass index is 25.99 kg/m  as calculated from the following:    Height as of 7/10/19: 1.753 m (5' 9\").    Weight as of this encounter: 79.8 kg (176 lb).  BP completed using cuff size: regular    Questioned patient about current smoking habits.  Pt. has never smoked.          The following HM Due: mammogram, pap smear      The following patient reported/Care Every where data was sent to:  P ABSTRACT QUALITY INITIATIVES [[id number]]  SAMMI Stallings MA            "

## 2020-03-06 NOTE — LETTER
March 13, 2020    Abigail Harvey  [address]  [place] [address]    Dear ,  This letter is regarding your recent Pap smear (cervical cancer screening) and Human Papillomavirus (HPV) test.  We are happy to inform you that your Pap smear result is normal. Cervical cancer is closely linked with certain types of HPV. Your results showed no evidence of high-risk HPV.  We recommend you have your next PAP smear and HPV test in 5 years.  You will still need to return to the clinic every year for an annual exam and other preventive tests.  If you have additional questions regarding this result, please call our registered nurse, Cherise at [phone].  Sincerely,    Genoveva Ash MD //irma

## 2020-03-10 LAB
COPATH REPORT: NORMAL
PAP: NORMAL

## 2020-03-12 LAB
FINAL DIAGNOSIS: NORMAL
HPV HR 12 DNA CVX QL NAA+PROBE: NEGATIVE
HPV16 DNA SPEC QL NAA+PROBE: NEGATIVE
HPV18 DNA SPEC QL NAA+PROBE: NEGATIVE
SPECIMEN DESCRIPTION: NORMAL
SPECIMEN SOURCE CVX/VAG CYTO: NORMAL

## 2020-04-06 NOTE — TELEPHONE ENCOUNTER
PCP: Emil Mak MD    Last appt: 2/19/2020  Future Appointments   Date Time Provider Martín Trujillo   5/27/2020  9:10 AM Emil Mak MD [facility] JEFF RODRIGUEZ       Requested Prescriptions     Pending Prescriptions Disp Refills    losartan (COZAAR) 100 mg tablet [Pharmacy Med Name: LOSARTAN POTASSIUM 100 MG TAB] 90 Tab 2     Sig: TAKE ONE TABLET BY MOUTH DAILY Patient calling to state that on 7-10-19 she was only given a 1 month supply and is out of this medication.  Please call patient with any questions.  OK to LM on VM.    [facility] [address]

## 2020-10-19 ENCOUNTER — VIRTUAL VISIT (OUTPATIENT)
Dept: FAMILY MEDICINE | Facility: CLINIC | Age: 45
End: 2020-10-19
Payer: COMMERCIAL

## 2020-10-19 DIAGNOSIS — R45.4 IRRITABILITY: ICD-10-CM

## 2020-10-19 DIAGNOSIS — F43.23 ADJUSTMENT DISORDER WITH MIXED ANXIETY AND DEPRESSED MOOD: Primary | ICD-10-CM

## 2020-10-19 DIAGNOSIS — J45.30 MILD PERSISTENT ASTHMA WITHOUT COMPLICATION: ICD-10-CM

## 2020-10-19 PROCEDURE — 99214 OFFICE O/P EST MOD 30 MIN: CPT | Mod: TEL | Performed by: FAMILY MEDICINE

## 2020-10-19 PROCEDURE — 96127 BRIEF EMOTIONAL/BEHAV ASSMT: CPT | Mod: 59 | Performed by: FAMILY MEDICINE

## 2020-10-19 RX ORDER — FLUOXETINE 10 MG/1
TABLET, FILM COATED ORAL
Qty: 30 TABLET | Refills: 0 | Status: SHIPPED | OUTPATIENT
Start: 2020-10-19 | End: 2020-11-10

## 2020-10-19 RX ORDER — MONTELUKAST SODIUM 10 MG/1
TABLET ORAL
COMMUNITY
Start: 2020-04-07 | End: 2022-05-02

## 2020-10-19 ASSESSMENT — ANXIETY QUESTIONNAIRES
3. WORRYING TOO MUCH ABOUT DIFFERENT THINGS: NOT AT ALL
2. NOT BEING ABLE TO STOP OR CONTROL WORRYING: SEVERAL DAYS
GAD7 TOTAL SCORE: 4
7. FEELING AFRAID AS IF SOMETHING AWFUL MIGHT HAPPEN: NOT AT ALL
1. FEELING NERVOUS, ANXIOUS, OR ON EDGE: SEVERAL DAYS
IF YOU CHECKED OFF ANY PROBLEMS ON THIS QUESTIONNAIRE, HOW DIFFICULT HAVE THESE PROBLEMS MADE IT FOR YOU TO DO YOUR WORK, TAKE CARE OF THINGS AT HOME, OR GET ALONG WITH OTHER PEOPLE: SOMEWHAT DIFFICULT
6. BECOMING EASILY ANNOYED OR IRRITABLE: SEVERAL DAYS
5. BEING SO RESTLESS THAT IT IS HARD TO SIT STILL: NOT AT ALL

## 2020-10-19 ASSESSMENT — PATIENT HEALTH QUESTIONNAIRE - PHQ9
5. POOR APPETITE OR OVEREATING: SEVERAL DAYS
SUM OF ALL RESPONSES TO PHQ QUESTIONS 1-9: 2

## 2020-10-19 NOTE — PROGRESS NOTES
"Abigail Harvey is a 44 year old female who is being evaluated via a billable telephone visit.      The patient has been notified of following:     \"This telephone visit will be conducted via a call between you and your physician/provider. We have found that certain health care needs can be provided without the need for a physical exam.  This service lets us provide the care you need with a short phone conversation.  If a prescription is necessary we can send it directly to your pharmacy.  If lab work is needed we can place an order for that and you can then stop by our lab to have the test done at a later time.    Telephone visits are billed at different rates depending on your insurance coverage. During this emergency period, for some insurers they may be billed the same as an in-person visit.  Please reach out to your insurance provider with any questions.    If during the course of the call the physician/provider feels a telephone visit is not appropriate, you will not be charged for this service.\"    Patient has given verbal consent for Telephone visit?  Yes    What phone number would you like to be contacted at? [phone]    How would you like to obtain your AVS? none    Subjective     Abigail Harvey is a 44 year old female who presents via phone visit today for the following health issues:    HPI     Abnormal Mood Symptoms  Onset/Duration: more recent in the last few weeks  Description:   Depression (if yes, do PHQ-9): YES  Anxiety (if yes, do WILLIAMS-7): YES  Accompanying Signs & Symptoms:  Still participating in activities that you used to enjoy: YES  Fatigue: no  Irritability: YES  Difficulty concentrating: no  Changes in appetite: no  Problems with sleep: no  Heart racing/beating fast: no  Abnormally elevated, expansive, or irritable mood: YES  Persistently increased activity or energy: no  Thoughts of hurting yourself or others: no  History:  Recent stress or major life event: YES  Prior depression or " anxiety: anxiety in the past  Family history of depression or anxiety: no  Alcohol/drug use: no  Difficulty sleeping: no    PHQ 7/10/2019 10/19/2020   PHQ-9 Total Score 0 2   Q9: Thoughts of better off dead/self-harm past 2 weeks Not at all Not at all     WILLIAMS-7 SCORE 4/22/2016 7/10/2019 10/19/2020   Total Score - 2 (minimal anxiety) -   Total Score 5 2 4     Asthma Follow-Up    Was ACT completed today?    Yes    ACT Total Scores 10/19/2020   ACT TOTAL SCORE (Goal Greater than or Equal to 20) 25   In the past 12 months, how many times did you visit the emergency room for your asthma without being admitted to the hospital? 0   In the past 12 months, how many times were you hospitalized overnight because of your asthma? 0     BMI 26 with Hx of mild persistent asthma Advair  & allegra & on albuterol managed by the allergist, hx of menorrhagia, with resolved anemia on combined BCP's,  patella chondromalacia, hx of lyme's treated aug to ct 2017 with resolution of elevated liver tests, prior left thumb injury 2/2017 S/P hand therapy, tooth extraction, C section x 2, seen 1/11/18 for a physical, iron, CMP, lipids were normal  Seen 5/29/17 for sore throat, strep negative. Seen 10/9 for body aches, fatigue, sore throat, abdominal pain, asthma exacerbation, going on since august with suggestion of tonsillitis, given Amoxil, Advair refilled, cbc, B 12, vit d, BMP, TSH were normal, LFT S were elevated, had some iron deficiency, tested positive for lyme's treated for doxycycline 28 days. Hep A, B & C negative and immune to hep A & B. on 10/27 lfts back to normal and ferritin normal, 12/30 ACT noted as 21. u/S abdomen never done. Seen 1/11/18 for a physical. On  BCP for heavy periods that were causing her anemia in the past. Was counseled on risk of blood clot, stroke, endometrial and breast cancer increased with age more than 35, smoking, obesity and family history . She elbow risk other than her weight. Decided to continue to  take till age 44 then to see gyn for options. Asthma action plan given. Increased Advair to 115 /21 2 puffs twice a day as not controlled. Albuterol as needed. Continued on her allergy pill. Referral to allergist given.  Iron, CMP, lipids were normal.    Seen 5/18/18 for chills, sweating, fatigue , exam, cbc, CMP and lyme s was negative and dx with a viral illness. Seen by travel clinic 5/23/19 and visited [facility] 7/18 to 8/3/19. Seen by PCP Dr Crain 7/10/19 for birth control and asthma and meds refilled for a year.   Seen 11/1/.2019 for a persistent cough. , & chest tightness. Exam  cxr and pertussis test was normal. Suspected asthma exacerbation from a viral uri. Given Tessalon and ipratropium and continued on albuterol and Advair, declined prednisone at the time.   Called 3/3/20 about menorrhagia dn referred to gyn. Seen y gyn and continued on seasonal , pap done , declined u/s to rule out structural issues as cause for menorrhagia and advised to follow up with gyn yearly. [place]  negative    CURRENTLY  Apt made few hrs ago for TE visit to discuss starting a low dose depression med.   Not been on any med in the past  Reason calling realized having a very low frustration tolerance and working on that   Very irritable  As pandemic is continuing  Working at home, concerned about the winter  Taking to a friend , told maybe perimenopausal hormonal changes on mood. would like to try low dose med to help mood and make it a better winter for her self and her family    Reports no current symptoms.  Reports asthma is improved allergies are good, depending on when she is exposed to like pollen  No longer on daily Advair and uses inhaler albuterol very rarely.  Has been getting allergy shots once every 3 weeks for the past 2 years and that has helped has only had to use Allegra as needed and using the Singulair as needed as well.  Flu shot at the allergy clinic unknown date may be 1 month ago.  Unknown about pneumonia  status.       Review of Systems   Constitutional, HEENT, cardiovascular, pulmonary, GI, , musculoskeletal, neuro, skin, endocrine and psych systems are negative, except as otherwise noted.       Objective          Vitals:  No vitals were obtained today due to virtual visit.    healthy, alert, no distress and cooperative  PSYCH: Alert and oriented times 3; coherent speech, normal   rate and volume, able to articulate logical thoughts, able   to abstract reason, no tangential thoughts, no hallucinations   or delusions  Her affect is terse & defensive  RESP: No cough, no audible wheezing, able to talk in full sentences  Remainder of exam unable to be completed due to telephone visits    No results found for any visits on 10/19/20.        Assessment/Plan:    Assessment & Plan     Diagnoses and all orders for this visit:    Adjustment disorder with mixed anxiety and depressed mood  -     FLUoxetine (PROZAC) 10 MG tablet; Start 5 mh !/2 tab) daily 1 week then increase to 10 mg full tab daily    Irritability  -     FLUoxetine (PROZAC) 10 MG tablet; Start 5 mh !/2 tab) daily 1 week then increase to 10 mg full tab daily    Mild persistent asthma without complication          Start fluoxetine 5 mg at bedtime half tablet daily for 1 week then increase to 10 mg daily.  Check in with us in 2 to 3 weeks.  Benefits may take 4 to 6 weeks to show up after side effects.  May need to try several medications before reaching one that works best for you.  May need to go higher on the dose if current dose is not effective.  Counseling, vitamin D and multivitamin & a seasonal affective lamp may help mood as well.  Continue with exercise which is also helpful.  Continue care with the allergist and clarify the pneumonia vaccine date with them.  Continue care with her gynecologist if you need more help with perimenopausal symptoms  Return for preventive physical at your convenience.    Discussed the pathophysiology of anxiety/depression  "episodes and the various symptoms seen associated with anxiety episodes. Discussed possible triggers including fatigue, depression, stress, and chemicals such as alcohol, caffeine and certain drugs. Discussed the treatment including an aerobic exercise program, adequate rest, and both rescue meds and maintenance meds. We discussed the treatment for anxiety and depression in detail.  The importance of a multi faceted approach in controlling symptoms was reviewed.  The benefits of cognitive behavioral therapy reviewed, benefits of exercise, and stress reduction also discussed.      Duration of treatment is at least 9 months with medication. It may take 3-4 weeks before symptom improvement happens.  Do not stop medication suddenly, medication will need to be tapered off.  Side effects include decreased sex drive, drowsiness, weight gain, insomnia, anxiety, dizziness, headache, and nausea.  Some of these get better after the first 2-4 weeks.  We may have to try several different medications before we find the one that's right for you.  Some patients experience worsening of mood and can even have suicidal thoughts when they start these medications.  If that is the case go to the ER.      Consider therapy - CBT   Vitamin D 2000 IU daily , also recommend a multivitamin, and fish oil daily  Avoid Caffeine and alcohol   Valerian root extract for relaxation and sleep OR Melatonin at bedtime.  Books by [place] doctor Forrest Hollingsworth. Check out his website. https://www.[employer]/  \"The Chemistry of Calm\" by Rahul Matthew . Also \"Chemistry of Ness\" book and Work book by same author  \"Hope and Help for your Nerves\" by Reba Rosas   If you are experiencing a mental health crisis call the crisis response provider in your community :  Graham: Adult [phone] children [phone]  Radha: Adult [phone] children [phone]  In a life threatening emergency , call 911     Otherwise if need help for urgent mental health please go to " the Behavioral emergency Center at [facility]   Regular exercise  See Patient Instructions    Return in about 3 weeks (around 11/9/2020) for Routine Visit for chronic issues with PCP.    Beryl Patel MD  [facility]    Phone call duration:  [phone] = 11 minutes

## 2020-10-19 NOTE — PATIENT INSTRUCTIONS
Start fluoxetine 5 mg at bedtime half tablet daily for 1 week then increase to 10 mg daily.  Check in with us in 2 to 3 weeks.  Benefits may take 4 to 6 weeks to show up after side effects.  May need to try several medications before reaching one that works best for you.  May need to go higher on the dose if current dose is not effective.  Counseling, vitamin D and multivitamin & a seasonal affective lamp may help mood as well.  Continue with exercise which is also helpful.  Continue care with the allergist and clarify the pneumonia vaccine date with them.  Continue care with her gynecologist if you need more help with perimenopausal symptoms  Return for preventive physical at your convenience.    Discussed the pathophysiology of anxiety/depression episodes and the various symptoms seen associated with anxiety episodes. Discussed possible triggers including fatigue, depression, stress, and chemicals such as alcohol, caffeine and certain drugs. Discussed the treatment including an aerobic exercise program, adequate rest, and both rescue meds and maintenance meds. We discussed the treatment for anxiety and depression in detail.  The importance of a multi faceted approach in controlling symptoms was reviewed.  The benefits of cognitive behavioral therapy reviewed, benefits of exercise, and stress reduction also discussed.      Duration of treatment is at least 9 months with medication. It may take 3-4 weeks before symptom improvement happens.  Do not stop medication suddenly, medication will need to be tapered off.  Side effects include decreased sex drive, drowsiness, weight gain, insomnia, anxiety, dizziness, headache, and nausea.  Some of these get better after the first 2-4 weeks.  We may have to try several different medications before we find the one that's right for you.  Some patients experience worsening of mood and can even have suicidal thoughts when they start these medications.  If that is the case go to  "the ER.      Consider therapy - CBT   Vitamin D 2000 IU daily , also recommend a multivitamin, and fish oil daily  Avoid Caffeine and alcohol   Valerian root extract for relaxation and sleep OR Melatonin at bedtime.  Books by [place] doctor Forrest Hollingsworth. Check out his website. https://www.[employer]/  \"The Chemistry of Calm\" by Rahul Matthew . Also \"Chemistry of Ness\" book and Work book by same author  \"Hope and Help for your Nerves\" by Reba Rosas   If you are experiencing a mental health crisis call the crisis response provider in your community :  Graham: Adult [phone] children [phone]  Radha: Adult [phone] children [phone]  In a life threatening emergency , call 911     Otherwise if need help for urgent mental health please go to the Behavioral emergency Center at [facility]           "

## 2020-10-20 ASSESSMENT — ANXIETY QUESTIONNAIRES: GAD7 TOTAL SCORE: 4

## 2020-10-20 ASSESSMENT — ASTHMA QUESTIONNAIRES: ACT_TOTALSCORE: 25

## 2020-11-10 DIAGNOSIS — R45.4 IRRITABILITY: ICD-10-CM

## 2020-11-10 DIAGNOSIS — F43.23 ADJUSTMENT DISORDER WITH MIXED ANXIETY AND DEPRESSED MOOD: ICD-10-CM

## 2020-11-11 ENCOUNTER — VIRTUAL VISIT (OUTPATIENT)
Dept: FAMILY MEDICINE | Facility: CLINIC | Age: 45
End: 2020-11-11
Payer: COMMERCIAL

## 2020-11-11 DIAGNOSIS — F43.23 ADJUSTMENT DISORDER WITH MIXED ANXIETY AND DEPRESSED MOOD: Primary | ICD-10-CM

## 2020-11-11 DIAGNOSIS — R45.4 IRRITABILITY: ICD-10-CM

## 2020-11-11 PROCEDURE — 99213 OFFICE O/P EST LOW 20 MIN: CPT | Mod: TEL | Performed by: FAMILY MEDICINE

## 2020-11-11 RX ORDER — FLUOXETINE 20 MG/1
20 TABLET, FILM COATED ORAL DAILY
Qty: 30 TABLET | Refills: 1 | Status: SHIPPED | OUTPATIENT
Start: 2020-11-11 | End: 2020-12-09

## 2020-11-11 RX ORDER — FLUOXETINE 10 MG/1
TABLET, FILM COATED ORAL
Qty: 30 TABLET | Refills: 0 | OUTPATIENT
Start: 2020-11-11

## 2020-11-11 ASSESSMENT — ANXIETY QUESTIONNAIRES
1. FEELING NERVOUS, ANXIOUS, OR ON EDGE: NOT AT ALL
GAD7 TOTAL SCORE: 0
7. FEELING AFRAID AS IF SOMETHING AWFUL MIGHT HAPPEN: NOT AT ALL
5. BEING SO RESTLESS THAT IT IS HARD TO SIT STILL: NOT AT ALL
3. WORRYING TOO MUCH ABOUT DIFFERENT THINGS: NOT AT ALL
6. BECOMING EASILY ANNOYED OR IRRITABLE: NOT AT ALL
IF YOU CHECKED OFF ANY PROBLEMS ON THIS QUESTIONNAIRE, HOW DIFFICULT HAVE THESE PROBLEMS MADE IT FOR YOU TO DO YOUR WORK, TAKE CARE OF THINGS AT HOME, OR GET ALONG WITH OTHER PEOPLE: NOT DIFFICULT AT ALL
2. NOT BEING ABLE TO STOP OR CONTROL WORRYING: NOT AT ALL

## 2020-11-11 ASSESSMENT — PATIENT HEALTH QUESTIONNAIRE - PHQ9
5. POOR APPETITE OR OVEREATING: NOT AT ALL
SUM OF ALL RESPONSES TO PHQ QUESTIONS 1-9: 0

## 2020-11-11 NOTE — PATIENT INSTRUCTIONS
Increase fluoxetine to 20 mg daily   Counselling may also help  Stay active  Continue care with your allergist and gynecologist regarding your other meds  Return in the spring for a preventive physical  Check in with us in 4 to 6 weeks virtually regarding your mood and fluoxetine

## 2020-11-11 NOTE — PROGRESS NOTES
"Abigail Harvey is a 44 year old female who is being evaluated via a billable telephone visit.      The patient has been notified of following:     \"This telephone visit will be conducted via a call between you and your physician/provider. We have found that certain health care needs can be provided without the need for a physical exam.  This service lets us provide the care you need with a short phone conversation.  If a prescription is necessary we can send it directly to your pharmacy.  If lab work is needed we can place an order for that and you can then stop by our lab to have the test done at a later time.    Telephone visits are billed at different rates depending on your insurance coverage. During this emergency period, for some insurers they may be billed the same as an in-person visit.  Please reach out to your insurance provider with any questions.    If during the course of the call the physician/provider feels a telephone visit is not appropriate, you will not be charged for this service.\"    Patient has given verbal consent for Telephone visit?  Yes    What phone number would you like to be contacted at? [phone]      How would you like to obtain your AVS? Mail a copy    Subjective     Abigail Harvey is a 44 year old female who presents via phone visit today for the following health issues:    HPI     Depression and Anxiety Follow-Up    How are you doing with your depression since your last visit? Improved         How are you doing with your anxiety since your last visit?  Improved     Are you having other symptoms that might be associated with depression or anxiety? No    Have you had a significant life event? No     Do you have any concerns with your use of alcohol or other drugs? No    Social History     Tobacco Use     Smoking status: Never Smoker     Smokeless tobacco: Never Used   Substance Use Topics     Alcohol use: Yes     Comment: occasional     Drug use: No     PHQ 7/10/2019 10/19/2020 " 11/11/2020   PHQ-9 Total Score 0 2 0   Q9: Thoughts of better off dead/self-harm past 2 weeks Not at all Not at all Not at all     WILLIAMS-7 SCORE 7/10/2019 10/19/2020 11/11/2020   Total Score 2 (minimal anxiety) - -   Total Score 2 4 0     Last PHQ-9 11/11/2020   1.  Little interest or pleasure in doing things 0   2.  Feeling down, depressed, or hopeless 0   3.  Trouble falling or staying asleep, or sleeping too much 0   4.  Feeling tired or having little energy 0   5.  Poor appetite or overeating 0   6.  Feeling bad about yourself 0   7.  Trouble concentrating 0   8.  Moving slowly or restless 0   Q9: Thoughts of better off dead/self-harm past 2 weeks 0   PHQ-9 Total Score 0   Difficulty at work, home, or with people Not difficult at all     WILLIAMS-7  11/11/2020   1. Feeling nervous, anxious, or on edge 0   2. Not being able to stop or control worrying 0   3. Worrying too much about different things 0   4. Trouble relaxing 0   5. Being so restless that it is hard to sit still 0   6. Becoming easily annoyed or irritable 0   7. Feeling afraid, as if something awful might happen 0   WILLIAMS-7 Total Score 0   If you checked any problems, how difficult have they made it for you to do your work, take care of things at home, or get along with other people? Not difficult at all       Suicide Assessment Five-step Evaluation and Treatment (SAFE-T)      How many servings of fruits and vegetables do you eat daily?  2-3    On average, how many sweetened beverages do you drink each day (Examples: soda, juice, sweet tea, etc.  Do NOT count diet or artificially sweetened beverages)?   1    How many days per week do you exercise enough to make your heart beat faster? 3 or less    How many minutes a day do you exercise enough to make your heart beat faster? 9 or less    How many days per week do you miss taking your medication? 0    BACKGROUND  BMI 26 with Hx of mild persistent asthma Advair  & allegra & on albuterol managed by the allergist,  hx of menorrhagia, with resolved anemia on combined BCP's, under care of her gynecologist, hx of  patella chondromalacia, hx of lyme's treated aug to ct 2017 with resolution of elevated liver tests, prior left thumb injury 2/2017 S/P hand therapy, tooth extraction, C section x 2,   Seen 1/11/17 for a physical, iron, CMP, lipids were normal. Seen 5/29/17 for sore throat, strep negative. Seen 10/9/17  for body aches, fatigue, sore throat, abdominal pain, asthma exacerbation, going on since august with suggestion of tonsillitis, given Amoxil, Advair refilled, cbc, B 12, vit d, BMP, TSH were normal, LFT S were elevated, had some iron deficiency, tested positive for lyme's treated with doxycycline 28 days. Hep A, B & C negative and immune to hep A & B. On 10/27/17 lfts back to normal and ferritin normal, 12/30 /17 ACT noted as 21. U/S abdomen never done. Seen 1/11/18 for a physical. On  BCP for heavy periods that were causing her anemia in the past. Was counseled on risk of blood clot, stroke, endometrial and breast cancer increased with age more than 35, smoking, obesity and family history. Decided to continue to take till age 44 then to see gyn for options. Asthma action plan given. Increased Advair to 115 /21 2 puffs twice a day as not controlled. Albuterol as needed. Continued on her allergy pill. Referral to allergist given.  Iron, CMP, lipids were normal.    Seen 5/18/18 for chills, sweating, fatigue , exam, cbc, CMP and lyme s was negative and dx with a viral illness. Seen by travel clinic 5/23/19 and visited [place] 7/18 to 8/3/19. Seen by PCP Dr Crain 7/10/19 for birth control and asthma and meds refilled for a year.   Seen 11/1/2019 for a persistent cough & chest tightness. Exam  cxr and pertussis test was normal. Suspected asthma exacerbation from a viral uri. Given Tessalon and ipratropium and continued on albuterol and Advair.  Called 3/3/20 about menorrhagia and referred to gyn. Seen by gyn and continued  on seasonal e after risks discussed, pap was done, opted then not to do an u/s to rule out structural issues as cause for menorrhagia and advised to follow up with gyn yearly.   TE done 10/19 for irritability and low frustration level and dx with adjustment disorder with mixed anxiety and mood issues and started on fluoxetine. Discussed counseling, supplements and risks benefits of meds and to check in 2 to 3 weeks. Noted was no longer on Advair and only using inhalers ad allegra and Singulair as needed managed by her allergist and med list updated to reflect that.   [place]  negative    Here for follow up since starting fluoxetine.   Thinks feeling calmer, not dramatic  No side effects     Review of Systems   Constitutional, HEENT, cardiovascular, pulmonary, GI, , musculoskeletal, neuro, skin, endocrine and psych systems are negative, except as otherwise noted.       Objective          Vitals:  No vitals were obtained today due to virtual visit.    healthy, alert, no distress and cooperative  PSYCH: Alert, coherent speech, normal   rate and volume, able to articulate logical thoughts, able   to abstract reason, no tangential thoughts, no hallucinations   or delusions  Her affect is normal.  RESP: No cough, no audible wheezing, able to talk in full sentences  Remainder of exam unable to be completed due to telephone visits    No results found for any visits on 11/11/20.        Assessment/Plan:    Assessment & Plan     Abigail was seen today for depression and anxiety.    Diagnoses and all orders for this visit:    Adjustment disorder with mixed anxiety and depressed mood  -     FLUoxetine 20 MG tablet; Take 1 tablet (20 mg) by mouth daily    Irritability  -     FLUoxetine 20 MG tablet; Take 1 tablet (20 mg) by mouth daily          Doing mildly better  Increase fluoxetine to 20 mg daily   Counselling may also help  Stay active  Continue care with your allergist and gynecologist regarding your other meds  Return in  the spring for a preventive physical  Check in with us in 4 to 6 weeks virtually regarding your mood and fluoxetine  Regular exercise  See Patient Instructions    Return in about 6 weeks (around 12/23/2020) for Follow up, with me, using a video visit.    Beryl Patel MD  [facility]    Phone call duration:  10 minutes

## 2020-11-12 ASSESSMENT — ANXIETY QUESTIONNAIRES: GAD7 TOTAL SCORE: 0

## 2021-02-01 ENCOUNTER — MYC MEDICAL ADVICE (OUTPATIENT)
Dept: FAMILY MEDICINE | Facility: CLINIC | Age: 46
End: 2021-02-01

## 2021-02-01 NOTE — LETTER
[facility] [address] [place] [address]  [phone]        February 2, 2021      Abigail Harvey  [address] [place], [place] [address] [facility] [address]      Dear Abigail,    As your health care provider, I am concerned that your age and/or underlying medical condition puts you at particularly high risk for serious complications should you contract a COVID-19 infection.     Specifically, you have the following conditions/diagnoses, included as high risk conditions per the Centers for Disease Control and Prevention at CDC.gov.     Asthma    COVID-19 is a new disease and there is limited information regarding risk factors for severe disease. Based on currently available information and clinical expertise, older adults and people of any age who have serious underlying medical conditions might be at higher risk for severe illness from COVID-19.     It is my medical opinion that you should avoid close contact with others and work from home if possible during this COVID-19 pandemic.     For further recommendations please defer to your Allergist/ asthma specialist    Beryl Patel MD    [facility]

## 2021-02-02 NOTE — TELEPHONE ENCOUNTER
Could you do a letter in daughter's chart that family members or house hold contacts need work from home accomodation as actual patient is a minor?  Iveth Batista RN  [facility]

## 2021-02-02 NOTE — TELEPHONE ENCOUNTER
Since this encounter is in her name, so all I can do is send a letter of high risk verification for her given a diagnosis of asthma which is being managed by her allergist . Her asthma is under care of her allergist so im glad she has reached out to them for her self  As far as her other family members they would have to make virtual encounters for themselves with providers to get their concerns addressed

## 2021-02-02 NOTE — TELEPHONE ENCOUNTER
I sent patient a My chart response that they may need to complete a Virtual visit to complete form.  Please advise.  Iveth Batista RN  [facility]    Patient sent a 2nd My Chart encounter:  Regarding the form I emailed a minute ago. 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 The form I sent you is regarding someone in my household being at risk of getting covid if I were to be exposed at work. My daughter Yahaira had very severe asthma in her past, we had to go to the specialty pediatric asthma clinic at [place] once a month to have her tested for about a year. Her asthma is now controlled, but I am very worried about being forced to return to work in person and exposing her to covid. I also have asthma and my 17 year old daughter has asthma too. 3 of the 4 of us in the household have asthma.

## 2021-02-03 NOTE — TELEPHONE ENCOUNTER
Letter mailed to [email or web address] as requested by patient.     Thanks! Alma Rosa Andrade RN     Detail Level: Detailed

## 2021-02-03 NOTE — TELEPHONE ENCOUNTER
Writer printed accomodation paperwork for Dr. Patel. Placed in Dr. Hoover forms folder.     Please see.    LOV: 11/11/20    Let triage know if you want a visit for this    Thanks! Alma Rosa Andrade RN

## 2021-02-03 NOTE — TELEPHONE ENCOUNTER
Dr. Patel.     Did the attached form get filled out?    Not sure where we are in the process of all of these forms.    Thanks! Alma Rosa Andrade RN

## 2021-02-03 NOTE — TELEPHONE ENCOUNTER
Please have form filled by her allergist who has bene managing her asthma as it would hold more clout with that   Letter given can be faxed to where ever it needs to go

## 2021-02-03 NOTE — TELEPHONE ENCOUNTER
I dont know about any forms. I sent letters with respect to what I could write in regards to their concerns given information in epic

## 2021-02-07 ENCOUNTER — HEALTH MAINTENANCE LETTER (OUTPATIENT)
Age: 46
End: 2021-02-07

## 2021-02-22 ENCOUNTER — MYC MEDICAL ADVICE (OUTPATIENT)
Dept: FAMILY MEDICINE | Facility: CLINIC | Age: 46
End: 2021-02-22

## 2021-02-22 NOTE — TELEPHONE ENCOUNTER
Fall 2/21/2021, legs went opposite ways on her knees  Complaining of bilateral knee pain on the inside suface of her knees  Woke he up all night when she would move  No bruising  Swollen this morning  Ibuprofen and elevation, ice 20 minutes an hour.    Asking for something for pain for at night, nothing that would make her loopy because she has to go to work tomorrow.  Or should she just alternate Ibuprofen with Tylenol or something?  Please see My Chart message in this encounter as well.  Please advise.  Iveth Batista RN  [facility]

## 2021-02-22 NOTE — TELEPHONE ENCOUNTER
Best medication for MSK injuries is antiinflammatories  And only ones that dont cause loopiness  ice ten min twice a day   Ibuprofen 600 mg three times a day alternate with acetaminophen 1000 mg three times a day   Will feel worse before gets better  Can try icy hot or apply otc pain cream to area  If unbreakable be see in ortho walkin clinic or UC

## 2021-02-22 NOTE — TELEPHONE ENCOUNTER
Patient informed as below and will wait a few days and reassess if she feels she needs to go to Urgent Care.  Iveth Batista RN  [facility]

## 2021-03-03 DIAGNOSIS — R45.4 IRRITABILITY: ICD-10-CM

## 2021-03-03 DIAGNOSIS — F43.23 ADJUSTMENT DISORDER WITH MIXED ANXIETY AND DEPRESSED MOOD: ICD-10-CM

## 2021-03-03 RX ORDER — FLUOXETINE 20 MG/1
20 TABLET, FILM COATED ORAL DAILY
Qty: 90 TABLET | Refills: 0 | Status: CANCELLED | OUTPATIENT
Start: 2021-03-03

## 2021-03-04 ENCOUNTER — OFFICE VISIT (OUTPATIENT)
Dept: FAMILY MEDICINE | Facility: CLINIC | Age: 46
End: 2021-03-04
Payer: COMMERCIAL

## 2021-03-04 ENCOUNTER — ANCILLARY PROCEDURE (OUTPATIENT)
Dept: GENERAL RADIOLOGY | Facility: CLINIC | Age: 46
End: 2021-03-04
Attending: FAMILY MEDICINE
Payer: COMMERCIAL

## 2021-03-04 VITALS
TEMPERATURE: 98.4 F | RESPIRATION RATE: 14 BRPM | HEART RATE: 79 BPM | SYSTOLIC BLOOD PRESSURE: 130 MMHG | BODY MASS INDEX: 27.28 KG/M2 | HEIGHT: 68 IN | OXYGEN SATURATION: 98 % | WEIGHT: 180 LBS | DIASTOLIC BLOOD PRESSURE: 80 MMHG

## 2021-03-04 DIAGNOSIS — R45.4 IRRITABILITY: ICD-10-CM

## 2021-03-04 DIAGNOSIS — M25.561 ACUTE PAIN OF BOTH KNEES: ICD-10-CM

## 2021-03-04 DIAGNOSIS — F43.23 ADJUSTMENT DISORDER WITH MIXED ANXIETY AND DEPRESSED MOOD: ICD-10-CM

## 2021-03-04 DIAGNOSIS — M25.562 ACUTE PAIN OF BOTH KNEES: Primary | ICD-10-CM

## 2021-03-04 DIAGNOSIS — M25.562 ACUTE PAIN OF BOTH KNEES: ICD-10-CM

## 2021-03-04 DIAGNOSIS — M22.40 CHONDROMALACIA OF PATELLA, UNSPECIFIED LATERALITY: ICD-10-CM

## 2021-03-04 DIAGNOSIS — M25.561 ACUTE PAIN OF BOTH KNEES: Primary | ICD-10-CM

## 2021-03-04 PROCEDURE — 73562 X-RAY EXAM OF KNEE 3: CPT | Mod: LT | Performed by: RADIOLOGY

## 2021-03-04 PROCEDURE — 99214 OFFICE O/P EST MOD 30 MIN: CPT | Performed by: FAMILY MEDICINE

## 2021-03-04 RX ORDER — FLUOXETINE 20 MG/1
20 TABLET, FILM COATED ORAL DAILY
Qty: 90 TABLET | Refills: 0 | Status: SHIPPED | OUTPATIENT
Start: 2021-03-04 | End: 2021-05-12

## 2021-03-04 ASSESSMENT — PATIENT HEALTH QUESTIONNAIRE - PHQ9
SUM OF ALL RESPONSES TO PHQ QUESTIONS 1-9: 1
10. IF YOU CHECKED OFF ANY PROBLEMS, HOW DIFFICULT HAVE THESE PROBLEMS MADE IT FOR YOU TO DO YOUR WORK, TAKE CARE OF THINGS AT HOME, OR GET ALONG WITH OTHER PEOPLE: NOT DIFFICULT AT ALL
SUM OF ALL RESPONSES TO PHQ QUESTIONS 1-9: 1

## 2021-03-04 ASSESSMENT — ANXIETY QUESTIONNAIRES
4. TROUBLE RELAXING: NOT AT ALL
GAD7 TOTAL SCORE: 0
7. FEELING AFRAID AS IF SOMETHING AWFUL MIGHT HAPPEN: NOT AT ALL
3. WORRYING TOO MUCH ABOUT DIFFERENT THINGS: NOT AT ALL
2. NOT BEING ABLE TO STOP OR CONTROL WORRYING: NOT AT ALL
GAD7 TOTAL SCORE: 0
7. FEELING AFRAID AS IF SOMETHING AWFUL MIGHT HAPPEN: NOT AT ALL
1. FEELING NERVOUS, ANXIOUS, OR ON EDGE: NOT AT ALL
5. BEING SO RESTLESS THAT IT IS HARD TO SIT STILL: NOT AT ALL
GAD7 TOTAL SCORE: 0
6. BECOMING EASILY ANNOYED OR IRRITABLE: NOT AT ALL

## 2021-03-04 ASSESSMENT — MIFFLIN-ST. JEOR: SCORE: 1504.97

## 2021-03-04 NOTE — PROGRESS NOTES
"    Assessment & Plan     Acute pain of both knees  Knees exam normal  Hx and exam suggestive of medical knee ligament strain  should get better with time  Rest, ice, tylenol  Pillow between knees at night  Xray today unremarkable  See PT can do virtually  See sports med if not better  No indication for mri now unless not better in few weeks or if indicated by specialist  Fluoxetine filled  consider pneumonia vaccine, mammogram working on advance directives  Follow up gyn regarding birthcontrol  Return in 3 months for a physical   If desiring to go off fluoxetine would taper to 10 mg then stop after 1 week  May not notice changes off med till 4 to 6 weeks after last dose   - XR Knee Bilateral 3 Views; Future  - Orthopedic & Spine  Referral; Future  - [facility] HAND, AND CHIROPRACTIC REFERRAL; Future    Chondromalacia of patella, unspecified laterality  - XR Knee Bilateral 3 Views; Future    BMI 26.0-26.9,adult  Evaluate for stress fracture, none seen on xray  - XR Knee Bilateral 3 Views; Future    Adjustment disorder with mixed anxiety and depressed mood  Fluoxetine filled # 90  If desiring to go off fluoxetine would taper to 10 mg then stop after 1 week  May not notice changes off med till 4 to 6 weeks after last dose   - Fluoxetine 20 MG tablet; Take 1 tablet (20 mg) by mouth daily    Irritability  - Fluoxetine 20 MG tablet; Take 1 tablet (20 mg) by mouth daily    20 minutes spent on the date of the encounter doing chart review, history and exam, documentation and further activities as noted above     BMI:   Estimated body mass index is 27.37 kg/m  as calculated from the following:    Height as of this encounter: 1.727 m (5' 8\").    Weight as of this encounter: 81.6 kg (180 lb).   Weight management plan: Discussed healthy diet and exercise guidelines    See Patient Instructions    Return in about 3 months (around 6/4/2021) for Routine preventive, with me, in person.    Beryl Patel MD  [facility] [place]    Ann Hayes is a 45 year old who presents for the following health issues     HPI   Musculoskeletal problem/pain      Duration: 2/21/2021 injured knee, called 2/22 about pain, noted to MA today since 2/27    Description  Location:  Inner knee both side    Intensity:  moderate    Accompanying signs and symptoms:  Painful at times and dull ache     History  Previous similar problem: no   Previous evaluation:  none    Precipitating or alleviating factors:  Trauma or overuse: YES- fall   Aggravating factors include: worst at night and dull ache though out the day    Therapies tried and outcome: Ibuprofen and tylenol     On 2/21/21 was playing ice hockey & fell twice , once hit her head but was wearing a helmet & sustained no adverse effects. Then legs went out, & knees hit the ice. Had no pain, able to get up & walked home & had no pain but the next day started hurting badly, never swelled up much and didn't bruise. Iced it first couple days. Since then its been hurting in the inner knees. First week was walking slow edinson up and down stairs was difficult. Then mostly better during the day. Walked 4 miles last two days which is not unbearable. Can feel when walking but doesnt feel normal. Able to push through fine  When sleeping knees touching each other is uncomfortable & wakes her up. Putting a pillow between her knees helps. Was told to get it checked out if it did not go away. Is concerned this may bother her later in life. Reports no locking or buckling. Was careful first few days. Now able to bear full weight on it     Hx of prior chondromalacia of patella    BMI 28 down to 27    Adjustment disorder on fluoxetine. Feels helped. Desires 1  refill. Plans to taper after that  Depression and Anxiety Follow-Up    How are you doing with your depression since your last visit? Improved     How are you doing with your anxiety since your last visit?  Improved     Are you having other symptoms  that might be associated with depression or anxiety? No    Have you had a significant life event? 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 No  Answers for HPI/ROS submitted by the patient on 3/4/2021   If you checked off any problems, how difficult have these problems made it for you to do your work, take care of things at home, or get along with other people?: Not difficult at all  PHQ9 TOTAL SCORE: 1  WILLIAMS 7 TOTAL SCORE: 0    BACKGROUND  BMI 26 with Hx of mild persistent asthma uses Advair & allegra & albuterol prn managed by the allergist, hx of menorrhagia, with resolved anemia on combined BCP's managed by gyn,  Hx of patella chondromalacia, hx of lyme's treated aug to oct 2017 with resolution of elevated liver tests, prior left thumb injury 2/2017 S/P hand therapy, hx of tooth extraction, C section x 2,   Seen 5/29/17 for sore throat, strep negative. Seen 10/9/17 for body aches, fatigue, sore throat, abdominal pain, asthma exacerbation, going on since august with suggestion of tonsillitis, given Amoxil, Advair refilled, cbc, B 12, vit d, BMP, TSH were normal, LFT S were elevated, had some iron deficiency, tested positive for lyme's treated for doxycycline 28 days. Hep A, B & C negative and immune to hep A & B. On 10/27/17 lfts were back to normal and ferritin normal, 12/30/17 ACT noted as 21. U/S abdomen never done. Seen 1/11/18 for a physical. On  BCP for heavy periods that were causing her anemia in the past. Was counseled on risk of blood clot, stroke, endometrial and breast cancer increased with age more than 35, smoking, obesity and family history. Decided to continue to take till age 44 then to see gyn for options. Asthma action plan given. Increased Advair to 115 /21 2 puffs twice a day as not controlled. Albuterol as needed. Continued on her allergy pill. Referral to allergist given who took over her asthma care.  Iron, CMP, lipids  were normal.    Seen 5/18/18 for chills, sweating, fatigue , exam, cbc, CMP and lyme s was negative and dx with a viral illness. Seen by travel clinic 5/23/19 and [facility]/18 to 8/3/19. Seen by PCP Dr Crian 7/10/19 for birth control and asthma and meds refilled for a year.   Seen 11/1/.2019 for a persistent cough. , & chest tightness. Exam  cxr and pertussis test was normal. Suspected asthma exacerbation from a viral uri. Given Tessalon and ipratropium and continued on albuterol and Advair, declined prednisone at the time.   Called 3/3/20 about menorrhagia and referred to gyn. Seen by gyn and continued on seasonal , pap done , declined u/s to rule out structural issues as cause for menorrhagia and advised to follow up with gyn yearly.   TE done 10/19/20 for irritability and low frustration level and dx with adjustment disorder with mixed anxiety and mood issues and started on fluoxetine. Discussed counseling, supplements and risks benefits of meds and to check in 2 to 3 weeks. Noted was no longer on Advair and only using inhalers and allegra and Singulair as needed managed by her allergist and med list updated to reflect that.  TE done 11/11/20 & fluoxetine to 20 mg & advised follow up in 4 to 6 week.  Called & letter given on 2/3  Called 2/22 about a fall had on 2/21/21 regarding knee injury & symptomatic care discussed.   [place]  negative    Social History     Tobacco Use     Smoking status: Never Smoker     Smokeless tobacco: Never Used   Substance Use Topics     Alcohol use: Yes     Comment: occasional     Drug use: No     PHQ 10/19/2020 11/11/2020 3/4/2021   PHQ-9 Total Score 2 0 1   Q9: Thoughts of better off dead/self-harm past 2 weeks Not at all Not at all Not at all     WILLIAMS-7 SCORE 10/19/2020 11/11/2020 3/4/2021   Total Score - - 0 (minimal anxiety)   Total Score 4 0 0     Last PHQ-9 3/4/2021   1.  Little interest or pleasure in doing things 0   2.  Feeling down, depressed, or hopeless 0   3.   "Trouble falling or staying asleep, or sleeping too much 1   4.  Feeling tired or having little energy 0   5.  Poor appetite or overeating 0   6.  Feeling bad about yourself 0   7.  Trouble concentrating 0   8.  Moving slowly or restless 0   Q9: Thoughts of better off dead/self-harm past 2 weeks 0   PHQ-9 Total Score 1   Difficulty at work, home, or with people -     WILLIAMS-7  3/4/2021   1. Feeling nervous, anxious, or on edge 0   2. Not being able to stop or control worrying 0   3. Worrying too much about different things 0   4. Trouble relaxing 0   5. Being so restless that it is hard to sit still 0   6. Becoming easily annoyed or irritable 0   7. Feeling afraid, as if something awful might happen 0   WILLIAMS-7 Total Score 0   If you checked any problems, how difficult have they made it for you to do your work, take care of things at home, or get along with other people? -       Review of Systems   Constitutional, HEENT, cardiovascular, pulmonary, GI, , musculoskeletal, neuro, skin, endocrine and psych systems are negative, except as otherwise noted.      Objective    /80 (BP Location: Right arm, Patient Position: Chair, Cuff Size: Adult Small)   Pulse 79   Temp 98.4  F (36.9  C) (Tympanic)   Resp 14   Ht 1.727 m (5' 8\")   Wt 81.6 kg (180 lb)   LMP 02/18/2021 (Approximate)   SpO2 98%   BMI 27.37 kg/m    Body mass index is 27.37 kg/m .  Physical Exam  Neurological:      Gait: Gait is intact.        GENERAL: healthy, alert and no distress  EYES: Eyes grossly normal to inspection, PERRL and conjunctivae and sclerae normal  RESP: lungs clear to auscultation - no rales, rhonchi or wheezes  CV: regular rate and rhythm, normal S1 S2, no S3 or S4, no murmur, click or rub, no peripheral edema and peripheral pulses strong  MS: no gross musculoskeletal defects noted, no edema  Knee Musculoskeletal Exam    Gait      Gait is normal.    Inspection      Leg length disparity: no discrepancy      Inspection - Right        " Right knee inspection is normal.        Inspection - Left        Left knee inspection is normal.     Palpation      Palpation - Right        Right knee palpation is unremarkable.        Palpation - Left        Left knee palpation is unremarkable.      Range of Motion      Range of Motion - Right        Right knee range of motion is normal.        Range of Motion - Left        Left knee range of motion is normal.      Strength      Strength - Right        Right knee strength is normal.        Strength - Left        Left knee strength is normal.      Instability      Instability Signs - Right        Instability signs: none - stable        Instability Signs - Left        Instability signs: none - stable      Neurovascular      Neurovascular - Right        Right knee neurovascular exam is normal.        Neurovascular - Left        Left knee neurovascular exam is normal.      Special Signs      Special Signs - Right Knee        Patellar compression: none        Special Signs - Left Knee        Patellar compression: none      SKIN: no suspicious lesions or rashes  NEURO: Normal strength and tone, mentation intact and speech normal  PSYCH: mentation appears normal, affect normal/bright    Xray - Reviewed and interpreted by me.  Normal xray knees

## 2021-03-04 NOTE — RESULT ENCOUNTER NOTE
Lissette Ms. Harvey,  Your results came back and shows the prior noted left knee cap degenerative changes that are not new  Xray did suggest mild right knee swelling  Likely from recent injury  Plan as discussed at visit today   . If you have any further concerns please do not hesitate to contact us by message, phone or making an appointment.  Have a good day   Dr Jorge PALAFOX

## 2021-03-04 NOTE — PATIENT INSTRUCTIONS
Knees exam normal  Hx and exam suggestive of medical knee ligament strain  should get better with time  Rest, ice, tylenol  Pillow between knees at night  Xray today   See PT can do virtually  See sports med if not better  No indication for mri now unless not better in few weeks or if indicated by specialist  Fluoxetine filled  consider pneumonia vaccine, mammogram working on advance directives  Return in 3 months for a physical   If desiring to go off fluoxetine would taper to 10 mg then stop after 1 week  May not notice changes off med till 4 to 6 weeks after last dose

## 2021-03-05 ASSESSMENT — PATIENT HEALTH QUESTIONNAIRE - PHQ9: SUM OF ALL RESPONSES TO PHQ QUESTIONS 1-9: 1

## 2021-03-05 ASSESSMENT — ANXIETY QUESTIONNAIRES: GAD7 TOTAL SCORE: 0

## 2021-03-08 NOTE — TELEPHONE ENCOUNTER
"Left detailed message for patient reviewing recommendation per below from Dr. Patel.  BONIFACIO Miguel, BSN, RN     "Ovi Roach in the [facility] ED.    Alejandro Todd MD  Neurohospitalist, Acute Care Services   of Neurology

## 2021-03-17 ENCOUNTER — THERAPY VISIT (OUTPATIENT)
Dept: PHYSICAL THERAPY | Facility: CLINIC | Age: 46
End: 2021-03-17
Attending: FAMILY MEDICINE
Payer: COMMERCIAL

## 2021-03-17 DIAGNOSIS — M25.562 ACUTE PAIN OF BOTH KNEES: ICD-10-CM

## 2021-03-17 DIAGNOSIS — M25.561 ACUTE PAIN OF RIGHT KNEE: ICD-10-CM

## 2021-03-17 DIAGNOSIS — M25.561 ACUTE PAIN OF BOTH KNEES: ICD-10-CM

## 2021-03-17 DIAGNOSIS — M25.562 ACUTE PAIN OF LEFT KNEE: ICD-10-CM

## 2021-03-17 PROCEDURE — 97161 PT EVAL LOW COMPLEX 20 MIN: CPT | Mod: 95 | Performed by: PHYSICAL THERAPIST

## 2021-03-17 PROCEDURE — 97110 THERAPEUTIC EXERCISES: CPT | Mod: 95 | Performed by: PHYSICAL THERAPIST

## 2021-03-17 NOTE — PROGRESS NOTES
[facility] Initial Evaluation(Virtual Evaluation     Present: no    Subjective:  Abigail Harvey is a 45 year old female with a lashon knee condition. Pt reports that she fell on the inside of her knees while playing hockey in Feb. Pain on the inside of both knees since without relief. Denies vague symptoms. Would like to get back to PLOF pain free.     Symptoms commenced as a result of: fall on ice playing hockey. Condition occurred in the following environment: community. Onset of symptoms: Feb 2021. Location of symptoms: medial joint line lashon knee. Pain level on number scale: 2/10(at rest, 5/10 at rest). Quality of pain: aching, stabbing at times. Associated symptoms: none. Pain frequency (constant/intermittent): constant with flares . Symptoms are exacerbated by: sleeping, up or down stairs, walking. Symptoms are relieved by: icing. Progression of symptoms since onset (same/better/worse): slightly better. Special tests (x-ray, MRI, CT scan, EMG, bone scan): Xrays negative. Previous treatment: none. Improvement with previous treatment: none. General health as reported by patient is good. Pertinent medical history includes: See Epic. Medical allergies: see Epic. Other pertinent surgeries: see Epic. Current medications: See Epic. Occupation: teacher Patient is (working in normal job without restrictions/working in normal job with restrictions/working in an alternate job/not working due to present treatment problem): not reported. Primary job tasks: daily tasks. Barriers at home/work: None reported by patient. Red flags: None reported by patient.    Objective  Gait:  normal  Screening: negative    Flexibility: limited assessment, gastroc tight    Knee AROM/PROM: R 0*-0-150, L 0*-0-150    Hip ROM: WFL(limited assessment)    Functional Squat: fair squat with feet too close together and valgus at the knee    Balance: fair SLS lashon      Assessment/Plan:    Patient is a 45 year old female with  both sides knee complaints.    Patient has the following significant findings with corresponding treatment plan.                Diagnosis 1:  Serafin knee   Pain -  self management, education and home program  Decreased ROM/flexibility - manual therapy and therapeutic exercise  Decreased joint mobility - manual therapy and therapeutic exercise  Decreased strength - therapeutic exercise and therapeutic activities  Impaired balance - neuro re-education and therapeutic activities  Impaired muscle performance - neuro re-education  Decreased function - therapeutic activities    Therapy Evaluation Codes:   1) History comprised of:   Personal factors that impact the plan of care:      Past/current experiences and Time since onset of symptoms.    Comorbidity factors that impact the plan of care are:      None.     Medications impacting care: None.  2) Examination of Body Systems comprised of:   Body structures and functions that impact the plan of care:      Knee.   Activity limitations that impact the plan of care are:      Driving, Jumping, Lifting, Reading/Computer work, Running, Sitting, Squatting/kneeling, Stairs, Standing and Walking.  3) Clinical presentation characteristics are:   Stable/Uncomplicated.  4) Decision-Making    Low complexity using standardized patient assessment instrument and/or measureable assessment of functional outcome.  Cumulative Therapy Evaluation is: Low complexity.    Previous and current functional limitations:  (See Goal Flow Sheet for this information)    Short term and Long term goals: (See Goal Flow Sheet for this information)     Communication ability:  Patient appears to be able to clearly communicate and understand verbal and written communication and follow directions correctly.  Treatment Explanation - The following has been discussed with the patient:   RX ordered/plan of care  Anticipated outcomes  Possible risks and side effects  This patient would benefit from PT intervention to  resume normal activities.   Rehab potential is good.    Frequency:  1 X week, once daily  Duration:  for 6 weeks  Discharge Plan:  Achieve all LTG.  Independent in home treatment program.  Reach maximal therapeutic benefit.    Please refer to the daily flowsheet for treatment today, total treatment time and time spent performing 1:1 timed codes.     Please Contact me with any questions or concerns. Thank you for for patience and cooperation.     Cb Ramírez PT, DPT, [facility]  Physical Therapist  [place] [place]  [phone]

## 2021-03-22 ENCOUNTER — THERAPY VISIT (OUTPATIENT)
Dept: PHYSICAL THERAPY | Facility: CLINIC | Age: 46
End: 2021-03-22
Payer: COMMERCIAL

## 2021-03-22 DIAGNOSIS — M25.561 ACUTE PAIN OF RIGHT KNEE: ICD-10-CM

## 2021-03-22 DIAGNOSIS — M25.562 ACUTE PAIN OF LEFT KNEE: ICD-10-CM

## 2021-03-22 PROCEDURE — 97110 THERAPEUTIC EXERCISES: CPT | Mod: 95 | Performed by: PHYSICAL THERAPIST

## 2021-05-05 PROBLEM — M25.561 ACUTE PAIN OF RIGHT KNEE: Status: RESOLVED | Noted: 2021-03-17 | Resolved: 2021-05-05

## 2021-05-05 PROBLEM — M25.562 ACUTE PAIN OF LEFT KNEE: Status: RESOLVED | Noted: 2021-03-17 | Resolved: 2021-05-05

## 2021-05-05 NOTE — PROGRESS NOTES
DISCHARGE SUMMARY    Abigail Harvey was seen 2 times for evaluation and treatment.  Patient did not return for further treatment and current status is unknown.  Due to short treatment duration, no objective or functional changes were made.  Please see goal flow sheet from episode noted date below and initial evaluation for further information.  Patient is discharged from therapy and therapy episode is resolved as of 05/05/21.      Linked Episodes   Type: Episode: Status: Noted: Resolved: Last update: Updated by:   PHYSICAL THERAPY Serafin knee pain 3/17/21 Active 3/17/2021  3/22/2021 12:43 PM Cb Ramírez, PT      Comments:     Please Contact me with any questions or concerns. Thank you for for patience and cooperation.     Cb Ramírez PT, DPT, [facility]  Physical Therapist  [place] [facility] [place]  [phone]

## 2021-05-18 ENCOUNTER — VIRTUAL VISIT (OUTPATIENT)
Dept: OBGYN | Facility: CLINIC | Age: 46
End: 2021-05-18
Payer: COMMERCIAL

## 2021-05-18 DIAGNOSIS — N92.0 EXCESSIVE OR FREQUENT MENSTRUATION: ICD-10-CM

## 2021-05-18 PROCEDURE — 99202 OFFICE O/P NEW SF 15 MIN: CPT | Mod: TEL | Performed by: OBSTETRICS & GYNECOLOGY

## 2021-05-18 RX ORDER — LEVONORGESTREL AND ETHINYL ESTRADIOL 0.15-0.03
1 KIT ORAL DAILY
Qty: 91 TABLET | Refills: 5 | Status: SHIPPED | OUTPATIENT
Start: 2021-05-18 | End: 2022-05-27

## 2021-05-18 NOTE — PROGRESS NOTES
Phone visit due to COVID19.      Start time: 1316  Stop time:1319    [facility] [place] [facility]    S: Patient reports she gets a light period every three months on the placebo week.  It typically lasts for 23 days.  She denies any pelvic pain and feels this method is working wonderfully.  She denies any hypertension, history of migraine with aura, or history of VTE      A&P: Abigail Harvey is a 45 year old  who presents today for phone discussion of birth control refill.     (N92.0) Excessive or frequent menstruation  Comment: Patient doing well on current regimen and would like to continue.  No side effect issues.  No medical contraindication to estrogen containing contraceptives  Plan: levonorgestrel-ethinyl estradiol (SEASONALE)         0.15-0.03 MG tablet          Nyasia Jacques MD

## 2021-09-18 ENCOUNTER — HEALTH MAINTENANCE LETTER (OUTPATIENT)
Age: 46
End: 2021-09-18

## 2022-02-01 ENCOUNTER — TELEPHONE (OUTPATIENT)
Dept: FAMILY MEDICINE | Facility: CLINIC | Age: 47
End: 2022-02-01
Payer: COMMERCIAL

## 2022-02-01 NOTE — TELEPHONE ENCOUNTER
Left message to call back and ask to speak with an available triage nurse.  MICHELLE ReedN, RN  [facility]

## 2022-02-01 NOTE — TELEPHONE ENCOUNTER
Reason for Call:  Medication or medication refill:    Do you use a [facility]?  Name of the pharmacy and phone number for the current request:  [facility] ([facility]) - [phone]    Name of the medication requested: Valtrex    Other request:    Can we leave a detailed message on this number? YES    Phone number patient can be reached at: Cell number on file:    Telephone Information:   Mobile [phone]       Best Time: Anytime    Call taken on 2/1/2022 at 4:15 PM by Dolores Mccabe

## 2022-02-02 NOTE — TELEPHONE ENCOUNTER
Yes agree  Is over due for an inperson physical  Also guidelines have changed regarding med and how prescribed and indications  Would appreciate her allergist send us notes where mentioned pcp take over etc

## 2022-02-02 NOTE — TELEPHONE ENCOUNTER
"Dr. Patel-Please review and advise if eVisit or virtual visit recommended to address medication request?      Patient called back and stated:  1. Previously Valtrex prescribed by Allergy doctor \"for years\"  2. Allergist said PCP should take over prescription  3. Breakouts-cold sores- on mouth three times per year and topical treatment does not work   A. Dose is 500 mg daily for 4 days  4. Usually would get 8 tablets prescribed per refill  5. Symptom onset was 2 weeks ago and just finished the last 4 tablets and requested refill through Allergy Specialist who deferred to PCP for prescribing    Writer informed patient Dr. Patel is out of clinic until 2/4/22 and she may ask patient to schedule virtual visit or submit eVisit to address this medication request.  In addition, this medication often involves annual check of kidney function via blood lab draw.    Patient verbalized understanding and in agreement with plan.    Thank you!  MICHELLE ReedN, RN  [facility]      "

## 2022-02-03 NOTE — TELEPHONE ENCOUNTER
Detailed message left for patient relaying comments per Dr. Patel.  BONIFACIO Payne, BSN, RN  [facility]

## 2022-02-15 ENCOUNTER — E-VISIT (OUTPATIENT)
Dept: FAMILY MEDICINE | Facility: CLINIC | Age: 47
End: 2022-02-15
Payer: COMMERCIAL

## 2022-02-15 DIAGNOSIS — Z86.19 HX OF COLD SORES: Primary | ICD-10-CM

## 2022-02-15 PROCEDURE — 99422 OL DIG E/M SVC 11-20 MIN: CPT | Performed by: FAMILY MEDICINE

## 2022-02-15 RX ORDER — FLUTICASONE PROPIONATE AND SALMETEROL XINAFOATE 115; 21 UG/1; UG/1
2 AEROSOL, METERED RESPIRATORY (INHALATION) 2 TIMES DAILY
COMMUNITY
Start: 2022-01-07 | End: 2023-11-30

## 2022-02-15 RX ORDER — VALACYCLOVIR HYDROCHLORIDE 1 G/1
2000 TABLET, FILM COATED ORAL ONCE
Qty: 2 TABLET | Refills: 0 | Status: SHIPPED | OUTPATIENT
Start: 2022-02-15 | End: 2022-02-18

## 2022-02-15 NOTE — TELEPHONE ENCOUNTER
Provider E-Visit time total (minutes): 11  Prashant Hayes   ABHIJIT canker sore is very different from a cold sore and treatment is different for each  Please send a picture of your recent out break so can confirm what type you actually have  Valtrex may be given for a cold sore. For the lip  It is usually prescribed 2 gram total x once best if used within 24 hrs of symptoms onset  It can have a side effect of acute kidney injury so need to use with caution  Meds only decrease duration of cold sore by couple days. If not treated it will also resolve on its own as well  It does nothing for canker sores  I can send in one time script but you are over due for a physical and labs to monitor meds such as these  Also would like you to get us your allergists notes sent to us to update your chart   Dr Patel

## 2022-02-17 NOTE — TELEPHONE ENCOUNTER
Provider E-Visit time total (minutes): 5  VENUS Acosta  This is the latest recommendation past several yrs for a cold sore on the lip to treat with valtrex 2 gram total x 1 once Its the lowest effective dose for the shortest duration of time and the standard of care dosing for cold sores on the lip  Dr Patel

## 2022-02-18 DIAGNOSIS — Z86.19 HX OF COLD SORES: ICD-10-CM

## 2022-02-18 RX ORDER — VALACYCLOVIR HYDROCHLORIDE 1 G/1
2000 TABLET, FILM COATED ORAL 2 TIMES DAILY
Qty: 2 TABLET | Refills: 1 | Status: SHIPPED | OUTPATIENT
Start: 2022-02-18 | End: 2022-11-22

## 2022-02-18 NOTE — Clinical Note
Clarification on Valtrex it is 2 tablets to equal 2 g twice a day for 1 day  I sent in the clarification to the pharmacy please let patient know

## 2022-02-18 NOTE — PROGRESS NOTES
Called pt, left voicemail stating clarification updated to pts pharmacy on recently prescribed medication and to call us with any questions    MISSAEL Doll RN  [facility]

## 2022-03-05 ENCOUNTER — HEALTH MAINTENANCE LETTER (OUTPATIENT)
Age: 47
End: 2022-03-05

## 2022-03-10 ENCOUNTER — ANCILLARY PROCEDURE (OUTPATIENT)
Dept: MAMMOGRAPHY | Facility: CLINIC | Age: 47
End: 2022-03-10
Attending: FAMILY MEDICINE
Payer: COMMERCIAL

## 2022-03-10 DIAGNOSIS — Z12.31 VISIT FOR SCREENING MAMMOGRAM: ICD-10-CM

## 2022-03-10 PROCEDURE — 77067 SCR MAMMO BI INCL CAD: CPT | Mod: TC | Performed by: RADIOLOGY

## 2022-05-02 ENCOUNTER — OFFICE VISIT (OUTPATIENT)
Dept: FAMILY MEDICINE | Facility: CLINIC | Age: 47
End: 2022-05-02
Payer: COMMERCIAL

## 2022-05-02 VITALS
RESPIRATION RATE: 20 BRPM | OXYGEN SATURATION: 98 % | WEIGHT: 181.2 LBS | HEIGHT: 68 IN | BODY MASS INDEX: 27.46 KG/M2 | TEMPERATURE: 96.8 F | SYSTOLIC BLOOD PRESSURE: 130 MMHG | DIASTOLIC BLOOD PRESSURE: 70 MMHG | HEART RATE: 76 BPM

## 2022-05-02 DIAGNOSIS — Z71.89 ADVANCED DIRECTIVES, COUNSELING/DISCUSSION: ICD-10-CM

## 2022-05-02 DIAGNOSIS — Z00.00 HEALTH CARE MAINTENANCE: ICD-10-CM

## 2022-05-02 DIAGNOSIS — Z91.09 ENVIRONMENTAL ALLERGIES: ICD-10-CM

## 2022-05-02 DIAGNOSIS — Z00.00 ROUTINE HISTORY AND PHYSICAL EXAMINATION OF ADULT: Primary | ICD-10-CM

## 2022-05-02 DIAGNOSIS — M22.40 CHONDROMALACIA OF PATELLA, UNSPECIFIED LATERALITY: ICD-10-CM

## 2022-05-02 DIAGNOSIS — N92.0 EXCESSIVE OR FREQUENT MENSTRUATION: ICD-10-CM

## 2022-05-02 DIAGNOSIS — R45.4 IRRITABILITY: ICD-10-CM

## 2022-05-02 DIAGNOSIS — Z13.1 SCREENING FOR DIABETES MELLITUS: ICD-10-CM

## 2022-05-02 DIAGNOSIS — F43.23 ADJUSTMENT DISORDER WITH MIXED ANXIETY AND DEPRESSED MOOD: ICD-10-CM

## 2022-05-02 DIAGNOSIS — Z86.19 HX OF COLD SORES: ICD-10-CM

## 2022-05-02 DIAGNOSIS — R03.0 ELEVATED BLOOD PRESSURE READING WITHOUT DIAGNOSIS OF HYPERTENSION: ICD-10-CM

## 2022-05-02 DIAGNOSIS — Z11.4 SCREENING FOR HIV (HUMAN IMMUNODEFICIENCY VIRUS): ICD-10-CM

## 2022-05-02 DIAGNOSIS — Z12.11 COLON CANCER SCREENING: ICD-10-CM

## 2022-05-02 DIAGNOSIS — Z23 NEED FOR PNEUMOCOCCAL VACCINATION: ICD-10-CM

## 2022-05-02 DIAGNOSIS — J45.30 MILD PERSISTENT ASTHMA WITHOUT COMPLICATION: ICD-10-CM

## 2022-05-02 DIAGNOSIS — D22.9 MULTIPLE PIGMENTED NEVI: ICD-10-CM

## 2022-05-02 LAB
BASOPHILS # BLD AUTO: 0 10E3/UL (ref 0–0.2)
BASOPHILS NFR BLD AUTO: 0 %
EOSINOPHIL # BLD AUTO: 0.1 10E3/UL (ref 0–0.7)
EOSINOPHIL NFR BLD AUTO: 1 %
ERYTHROCYTE [DISTWIDTH] IN BLOOD BY AUTOMATED COUNT: 11.8 % (ref 10–15)
HBA1C MFR BLD: 5 % (ref 0–5.6)
HCT VFR BLD AUTO: 40.9 % (ref 35–47)
HGB BLD-MCNC: 14 G/DL (ref 11.7–15.7)
IMM GRANULOCYTES # BLD: 0 10E3/UL
IMM GRANULOCYTES NFR BLD: 0 %
LYMPHOCYTES # BLD AUTO: 1.9 10E3/UL (ref 0.8–5.3)
LYMPHOCYTES NFR BLD AUTO: 27 %
MCH RBC QN AUTO: 30.5 PG (ref 26.5–33)
MCHC RBC AUTO-ENTMCNC: 34.2 G/DL (ref 31.5–36.5)
MCV RBC AUTO: 89 FL (ref 78–100)
MONOCYTES # BLD AUTO: 0.6 10E3/UL (ref 0–1.3)
MONOCYTES NFR BLD AUTO: 8 %
NEUTROPHILS # BLD AUTO: 4.5 10E3/UL (ref 1.6–8.3)
NEUTROPHILS NFR BLD AUTO: 63 %
PLATELET # BLD AUTO: 251 10E3/UL (ref 150–450)
RBC # BLD AUTO: 4.59 10E6/UL (ref 3.8–5.2)
WBC # BLD AUTO: 7.1 10E3/UL (ref 4–11)

## 2022-05-02 PROCEDURE — 80061 LIPID PANEL: CPT | Performed by: FAMILY MEDICINE

## 2022-05-02 PROCEDURE — 90471 IMMUNIZATION ADMIN: CPT | Performed by: FAMILY MEDICINE

## 2022-05-02 PROCEDURE — 36415 COLL VENOUS BLD VENIPUNCTURE: CPT | Performed by: FAMILY MEDICINE

## 2022-05-02 PROCEDURE — 80050 GENERAL HEALTH PANEL: CPT | Performed by: FAMILY MEDICINE

## 2022-05-02 PROCEDURE — 99213 OFFICE O/P EST LOW 20 MIN: CPT | Mod: 25 | Performed by: FAMILY MEDICINE

## 2022-05-02 PROCEDURE — 90732 PPSV23 VACC 2 YRS+ SUBQ/IM: CPT | Performed by: FAMILY MEDICINE

## 2022-05-02 PROCEDURE — 87389 HIV-1 AG W/HIV-1&-2 AB AG IA: CPT | Performed by: FAMILY MEDICINE

## 2022-05-02 PROCEDURE — 83036 HEMOGLOBIN GLYCOSYLATED A1C: CPT | Performed by: FAMILY MEDICINE

## 2022-05-02 PROCEDURE — 99396 PREV VISIT EST AGE 40-64: CPT | Mod: 25 | Performed by: FAMILY MEDICINE

## 2022-05-02 PROCEDURE — 82728 ASSAY OF FERRITIN: CPT | Performed by: FAMILY MEDICINE

## 2022-05-02 ASSESSMENT — ASTHMA QUESTIONNAIRES: ACT_TOTALSCORE: 23

## 2022-05-02 ASSESSMENT — ENCOUNTER SYMPTOMS
HEADACHES: 0
HEMATURIA: 0
CHILLS: 0
FEVER: 0
CONSTIPATION: 0
WEAKNESS: 0
SHORTNESS OF BREATH: 0
DIARRHEA: 0
SORE THROAT: 1
MYALGIAS: 0
NAUSEA: 0
HEARTBURN: 0
PALPITATIONS: 0
ABDOMINAL PAIN: 0
PARESTHESIAS: 0
DYSURIA: 0
ARTHRALGIAS: 0
EYE PAIN: 0
FREQUENCY: 0
COUGH: 0
HEMATOCHEZIA: 0
NERVOUS/ANXIOUS: 0
BREAST MASS: 0
DIZZINESS: 0
JOINT SWELLING: 0

## 2022-05-02 ASSESSMENT — ANXIETY QUESTIONNAIRES
7. FEELING AFRAID AS IF SOMETHING AWFUL MIGHT HAPPEN: NOT AT ALL
1. FEELING NERVOUS, ANXIOUS, OR ON EDGE: NOT AT ALL
6. BECOMING EASILY ANNOYED OR IRRITABLE: NOT AT ALL
GAD7 TOTAL SCORE: 0
3. WORRYING TOO MUCH ABOUT DIFFERENT THINGS: NOT AT ALL
7. FEELING AFRAID AS IF SOMETHING AWFUL MIGHT HAPPEN: NOT AT ALL
2. NOT BEING ABLE TO STOP OR CONTROL WORRYING: NOT AT ALL
4. TROUBLE RELAXING: NOT AT ALL
5. BEING SO RESTLESS THAT IT IS HARD TO SIT STILL: NOT AT ALL

## 2022-05-02 ASSESSMENT — PATIENT HEALTH QUESTIONNAIRE - PHQ9
SUM OF ALL RESPONSES TO PHQ QUESTIONS 1-9: 0
10. IF YOU CHECKED OFF ANY PROBLEMS, HOW DIFFICULT HAVE THESE PROBLEMS MADE IT FOR YOU TO DO YOUR WORK, TAKE CARE OF THINGS AT HOME, OR GET ALONG WITH OTHER PEOPLE: NOT DIFFICULT AT ALL
SUM OF ALL RESPONSES TO PHQ QUESTIONS 1-9: 0

## 2022-05-02 NOTE — NURSING NOTE
Prior to immunization administration, verified patients identity using patient s name and date of birth. Please see Immunization Activity for additional information.     Screening Questionnaire for Adult Immunization    Are you sick today?   No   Do you have allergies to medications, food, a vaccine component or latex?   No   Have you ever had a serious reaction after receiving a vaccination?   No   Do you have a long-term health problem with heart, lung, kidney, or metabolic disease (e.g., diabetes), asthma, a blood disorder, no spleen, complement component deficiency, a cochlear implant, or a spinal fluid leak?  Are you on long-term aspirin therapy?   No   Do you have cancer, leukemia, HIV/AIDS, or any other immune system problem?   No   Do you have a parent, brother, or sister with an immune system problem?   No   In the past 3 months, have you taken medications that affect  your immune system, such as prednisone, other steroids, or anticancer drugs; drugs for the treatment of rheumatoid arthritis, Crohn s disease, or psoriasis; or have you had radiation treatments?   No   Have you had a seizure, or a brain or other nervous system problem?   No   During the past year, have you received a transfusion of blood or blood    products, or been given immune (gamma) globulin or antiviral drug?   No   For women: Are you pregnant or is there a chance you could become       pregnant during the next month?   No   Have you received any vaccinations in the past 4 weeks?   No     Immunization questionnaire answers were all negative.        Per orders of Dr. Patel, injection of Pneumococcal 23 Valent given by Peggy Forrest MA. Patient instructed to remain in clinic for 15 minutes afterwards, and to report any adverse reaction to me immediately.       Screening performed by Peggy Forrest MA on 5/2/2022 at 4:33 PM.

## 2022-05-02 NOTE — PROGRESS NOTES
SUBJECTIVE:   CC: Abigail Harvey is an 46 year old woman who presents for preventive health visit.     Patient has been advised of split billing requirements and indicates understanding: Yes  Healthy Habits:     Getting at least 3 servings of Calcium per day:  Yes    Bi-annual eye exam:  Yes    Dental care twice a year:  NO    Sleep apnea or symptoms of sleep apnea:  None    Diet:  Regular (no restrictions)    Frequency of exercise:  4-5 days/week    Duration of exercise:  45-60 minutes    Taking medications regularly:  Yes    Medication side effects:  None    PHQ-2 Total Score: 0    Additional concerns today:  No    CURRENTLY   For preventive physical.  No breast issues.  No family history of breast ovarian or colon cancer.  Mammogram done 3/2021 was negative.  Pelvic Pap due .  PHQ equals 2 0.  Health maintenance reviewed.  No ACP on file but thinking of attending a  planning service at her [employer].  Colon cancer screening due and agreeable to doing a colonoscopy.  Lives with  Marek of 25 years and 2 daughters the older 1 is graduating from high school the second 1 is in middle school.  Has a dog that is a hypoallergenic short haired. Is a teacher.     BMI 27 weight up a little bit.  Mild persistent asthma well controlled on Advair twice a day and albuterol as needed no longer on Singulair managed by her allergist  Environmental allergies improved with allergy shots past 3 to 5 years as well as on Allegra daily.  No longer on the Singulair.  Chronic nasal congestion and sore throat reports COVID test -1-week ago.  Feels this is related to her usual allergies.  Agreeable to pneumonia vaccine.  COVID-vaccine x3 up-to-date.  Rest of vaccines up-to-date.  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    History of cold sores uses Valtrex as needed 2 tablets of 1000 mg  twice a day for 1 day.  As needed.  Refill has about 2-3 breakouts a year past 25 years previously allergist was prescribing this for he is couple of years.  R.    Menorrhagia managed on birth control pill refill by Gyn 5/2021 #91 with 5 refills so should cover to the end of 2022.  Reports no migraines with aura and no thromboembolism.  No side effects.  Systolic blood pressure trending up in the 130's 140's.  Asymptomatic.    BACKGROUND  BMI 26 with Hx of mild persistent asthma uses Advair & allegra & albuterol prn managed by the allergist [facility] asthma & allergy specialist at [place], hx of menorrhagia, with resolved anemia on combined BCP's managed by gyn,  Hx of patella chondromalacia, hx of lyme's treated aug to oct 2017 with resolution of elevated liver tests, prior left thumb injury 2/2017 S/P hand therapy, hx of tooth extraction, C section x 2,   Seen 5/29/17 for sore throat, strep negative. Seen 10/9/17 for body aches, fatigue, sore throat, abdominal pain, asthma exacerbation, going on since august with suggestion of tonsillitis, given Amoxil, Advair refilled, cbc, B 12, vit d, BMP, TSH were normal, LFT S were elevated, had some iron deficiency, tested positive for lyme's treated for doxycycline 28 days. Hep A, B & C negative and immune to hep A & B. On 10/27/17 lfts were back to normal and ferritin normal, 12/30/17 ACT noted as 21. U/S abdomen never done. Seen 1/11/18 for a physical. On  BCP for heavy periods that were causing her anemia in the past. Was counseled on risk of blood clot, stroke, endometrial and breast cancer increased with age more than 35, smoking, obesity and family history. Decided to continue to take till age 44 then to see gyn for options. Asthma action plan given. Increased Advair to 115 /21 2 puffs twice a day as not controlled. Albuterol as needed. Continued on her allergy pill. Referral to allergist  given who took over her asthma care.  Iron, CMP, lipids were normal.    Seen 5/18/18 for chills, sweating, fatigue , exam, cbc, CMP and lyme s was negative and dx with a viral illness. Seen by travel clinic 5/23/19 and visited [facility] 7/18 to 8/3/19. Seen by PCP Dr Crain 7/10/19 for birth control and asthma and meds refilled for a year.   Seen 11/1/.2019 for a persistent cough. , & chest tightness. Exam  cxr and pertussis test was normal. Suspected asthma exacerbation from a viral uri. Given Tessalon and ipratropium and continued on albuterol and Advair, declined prednisone at the time.   Called 3/3/20 about menorrhagia and referred to gyn. Seen by gyn and continued on seasonal , pap done , declined u/s to rule out structural issues as cause for menorrhagia and advised to follow up with gyn yearly.   TE done 10/19/20 for irritability and low frustration level and dx with adjustment disorder with mixed anxiety and mood issues and started on fluoxetine. Discussed counseling, supplements and risks benefits of meds and to check in 2 to 3 weeks. Noted was no longer on Advair and only using inhalers and allegra and Singulair as needed managed by her allergist and med list updated to reflect that.  TE done 11/11/20 & fluoxetine to 20 mg & advised follow up in 4 to 6 week.  Called & letter given on 2/3  Called 2/22 about a fall had on 2/21/21 regarding knee injury & symptomatic care discussed.    Seen 3/11/21 for knee pain, chondromalacia, discussed BMI, asthma, anxiety meds etc.  Exam of knee and x-ray was normal other than left degenerative changes.  Advised symptomatic care and referred to PT and orthopedics if not better.  Did 2 sessions of physical therapy.  Seen by gynecology 5/18/2021 and continued on birth control pills given 91 with 5 refills which would cover her till the end of 2022.  On 2/2022 requested and given Valtrex for history of cold sores.  Noted has had cold sores over 25 years and only use Valtrex as  needed in the last few years without any side effects.  Previously allergist was giving it to her.  [place]  negative     Today's PHQ-2 Score:   PHQ-2 (  [employer]) 2022   Q1: Little interest or pleasure in doing things 0   Q2: Feeling down, depressed or hopeless 0   PHQ-2 Score 0   PHQ-2 Total Score (12-17 Years)- Positive if 3 or more points; Administer PHQ-A if positive -   Q1: Little interest or pleasure in doing things Not at all   Q2: Feeling down, depressed or hopeless Not at all   PHQ-2 Score 0     Abuse: Current or Past (Physical, Sexual or Emotional) - No  Do you feel safe in your environment? Yes    Social History     Tobacco Use     Smoking status: Never Smoker     Smokeless tobacco: Never Used   Substance Use Topics     Alcohol use: Yes     Comment: occasional     If you drink alcohol do you typically have >3 drinks per day or >7 drinks per week? Yes    Alcohol Use 2022   Prescreen: >3 drinks/day or >7 drinks/week? No   Prescreen: >3 drinks/day or >7 drinks/week? -   No flowsheet data found.    Reviewed orders with patient.  Reviewed health maintenance and updated orders accordingly - Yes  Lab work is in process  Labs reviewed in EPIC  BP Readings from Last 3 Encounters:   22 130/70   21 130/80   20 (!) 135/92    Wt Readings from Last 3 Encounters:   22 82.2 kg (181 lb 3.2 oz)   21 81.6 kg (180 lb)   20 79.8 kg (176 lb)                  Patient Active Problem List   Diagnosis     Chondromalacia of patella     Excessive or frequent menstruation     Mild persistent asthma without complication     BMI 27.0-27.9,adult     Hx of cold sores     Past Surgical History:   Procedure Laterality Date      SECTION      x2     HC TOOTH EXTRACTION W/FORCEP         Social History     Tobacco Use     Smoking status: Never Smoker     Smokeless tobacco: Never Used   Substance Use Topics     Alcohol use: Yes     Comment: occasional     Family History   Problem Relation Age  of Onset     Hypertension Mother      Heart Disease Father         heart attack     Cancer Father         lung         Current Outpatient Medications   Medication Sig Dispense Refill     ADVAIR -21 MCG/ACT inhaler Inhale 2 puffs into the lungs 2 times daily       albuterol (PROAIR HFA/PROVENTIL HFA/VENTOLIN HFA) 108 (90 Base) MCG/ACT inhaler Inhale 2 puffs into the lungs every 6 hours as needed for shortness of breath / dyspnea or wheezing 1 Inhaler 3     Fexofenadine HCl (ALLEGRA PO) Take 180 mg by mouth daily as needed for allergies        levonorgestrel-ethinyl estradiol (SEASONALE) 0.15-0.03 MG tablet Take 1 tablet by mouth daily 91 tablet 5     valACYclovir (VALTREX) 1000 mg tablet Take 2 tablets (2,000 mg) by mouth 2 times daily for 1 dose 2 tablet 1     No Known Allergies  Recent Labs   Lab Test 05/02/22  1609 05/18/18  1512 01/11/18  0952 10/27/17  0954 10/13/17  1203 10/09/17  1700   A1C 5.0  --   --   --   --   --    LDL  --   --  101*  --   --   --    HDL  --   --  57  --   --   --    TRIG  --   --  152*  --   --   --    ALT  --  37 36 41   < > 111*   CR  --  0.63 0.78  --   --  0.69   GFRESTIMATED  --  >90 80  --   --  >90   GFRESTBLACK  --  >90 >90  --   --  >90   POTASSIUM  --  3.8 4.3  --   --  4.0   TSH  --   --   --   --   --  1.22    < > = values in this interval not displayed.        Breast Cancer Screening:  Any new diagnosis of family breast, ovarian, or bowel cancer? No    FHS-7:   Breast CA Risk Assessment (FHS-7) 3/10/2022   Did any of your first-degree relatives have breast or ovarian cancer? No   Did any of your relatives have bilateral breast cancer? No   Did any man in your family have breast cancer? No   Did any woman in your family have breast and ovarian cancer? No   Did any woman in your family have breast cancer before age 50 y? No   Do you have 2 or more relatives with breast and/or ovarian cancer? No   Do you have 2 or more relatives with breast and/or bowel cancer? No      Mammogram Screening: Recommended annual mammography  Pertinent mammograms are reviewed under the imaging tab.    History of abnormal Pap smear:   NO - age 30-65 PAP every 5 years with negative HPV co-testing recommended  Last 3 Pap and HPV Results:   PAP / HPV Latest Ref Rng & Units 3/6/2020 2015   PAP (Historical) - NIL NIL   HPV16 NEG:Negative Negative Negative   HPV18 NEG:Negative Negative Negative   HRHPV NEG:Negative Negative Negative     PAP / HPV Latest Ref Rng & Units 3/6/2020 2015   PAP (Historical) - NIL NIL   HPV16 NEG:Negative Negative Negative   HPV18 NEG:Negative Negative Negative   HRHPV NEG:Negative Negative Negative     Reviewed and updated as needed this visit by clinical staff   Tobacco  Allergies  Meds   Med Hx  Surg Hx  Fam Hx  Soc Hx          Reviewed and updated as needed this visit by Provider   Tobacco  Allergies  Meds   Med Hx  Surg Hx            Past Medical History:   Diagnosis Date     Adjustment disorder with mixed anxiety and depressed mood 11/10/2020     Injury of left thumb, initial encounter 2017     Irritability 11/10/2020     Lyme disease 2018     Thumb pain, left 3/23/2017      Past Surgical History:   Procedure Laterality Date      SECTION      x2     HC TOOTH EXTRACTION W/FORCEP       OB History    Para Term  AB Living   3 2 2 0 1 2   SAB IAB Ectopic Multiple Live Births   1 0 0 0 2      # Outcome Date GA Lbr Jos/2nd Weight Sex Delivery Anes PTL Lv   3 SAB            2 Term      CS-LTranv   MADELIN   1 Term      CS-LTranv   MADELIN       Review of Systems   Constitutional: Negative for chills and fever.   HENT: Positive for congestion and sore throat. Negative for ear pain and hearing loss.    Eyes: Negative for pain and visual disturbance.   Respiratory: Negative for cough and shortness of breath.    Cardiovascular: Negative for chest pain, palpitations and peripheral edema.   Gastrointestinal: Negative for abdominal pain,  "constipation, diarrhea, heartburn, hematochezia and nausea.   Breasts:  Negative for tenderness and breast mass.   Genitourinary: Negative for dysuria, frequency, genital sores, hematuria, pelvic pain, urgency, vaginal bleeding and vaginal discharge.   Musculoskeletal: Negative for arthralgias, joint swelling and myalgias.   Skin: Negative for rash.   Neurological: Negative for dizziness, weakness, headaches and paresthesias.   Psychiatric/Behavioral: Negative for mood changes. The patient is not nervous/anxious.       OBJECTIVE:   /70 (BP Location: Right arm, Patient Position: Sitting, Cuff Size: Adult Regular)   Pulse 76   Temp 96.8  F (36  C) (Temporal)   Resp 20   Ht 1.73 m (5' 8.11\")   Wt 82.2 kg (181 lb 3.2 oz)   SpO2 98%   BMI 27.46 kg/m    Physical Exam  GENERAL: healthy, alert, no distress and over weight  EYES: Eyes grossly normal to inspection, PERRL and conjunctivae and sclerae normal, wearing glasses  HENT: ear canals and TM's normal, nose and mouth without ulcers or lesions, ear & lip piercing's  NECK: no adenopathy, no asymmetry, masses, or scars and thyroid normal to palpation  RESP: lungs clear to auscultation - no rales, rhonchi or wheezes  BREAST: normal without masses, tenderness or nipple discharge and no palpable axillary masses or adenopathy  CV: regular rate and rhythm, normal S1 S2, no S3 or S4, no murmur, click or rub, no peripheral edema and peripheral pulses strong  ABDOMEN: soft, nontender, no hepatosplenomegaly, no masses and bowel sounds normal  MS: no gross musculoskeletal defects noted, no edema  SKIN: no suspicious lesions or rashes, few pigmented nevi, lentigines, freckles. Her dad  when she was 10. Has a tattoo of him made from his passport picture on her right arm when she was in her 30's. Has had a lip piercing since she was 18.   NEURO: Normal strength and tone, mentation intact and speech normal  PSYCH: mentation appears normal, affect normal/bright  LYMPH: no " cervical, supraclavicular, axillary, or inguinal adenopathy    Diagnostic Test Results:  Labs reviewed in Epic  Results for orders placed or performed in visit on 05/02/22 (from the past 24 hour(s))   CBC with platelets and differential    Narrative    The following orders were created for panel order CBC with platelets and differential.  Procedure                               Abnormality         Status                     ---------                               -----------         ------                     CBC with platelets and d...[[id number]]                      Final result                 Please view results for these tests on the individual orders.   Hemoglobin A1c   Result Value Ref Range    Hemoglobin A1C 5.0 0.0 - 5.6 %   CBC with platelets and differential   Result Value Ref Range    WBC Count 7.1 4.0 - 11.0 10e3/uL    RBC Count 4.59 3.80 - 5.20 10e6/uL    Hemoglobin 14.0 11.7 - 15.7 g/dL    Hematocrit 40.9 35.0 - 47.0 %    MCV 89 78 - 100 fL    MCH 30.5 26.5 - 33.0 pg    MCHC 34.2 31.5 - 36.5 g/dL    RDW 11.8 10.0 - 15.0 %    Platelet Count 251 150 - 450 10e3/uL    % Neutrophils 63 %    % Lymphocytes 27 %    % Monocytes 8 %    % Eosinophils 1 %    % Basophils 0 %    % Immature Granulocytes 0 %    Absolute Neutrophils 4.5 1.6 - 8.3 10e3/uL    Absolute Lymphocytes 1.9 0.8 - 5.3 10e3/uL    Absolute Monocytes 0.6 0.0 - 1.3 10e3/uL    Absolute Eosinophils 0.1 0.0 - 0.7 10e3/uL    Absolute Basophils 0.0 0.0 - 0.2 10e3/uL    Absolute Immature Granulocytes 0.0 <=0.4 10e3/uL       ASSESSMENT/PLAN:       ICD-10-CM    1. Routine history and physical examination of adult  Z00.00 Adult Gastro Ref - Procedure Only     Pneumococcal vaccine 23 valent PPSV23  (Pneumovax) [[id number]]     Hemoglobin A1c     Hemoglobin A1c   2. BMI 27.0-27.9,adult  Z68.27 Comprehensive metabolic panel (BMP + Alb, Alk Phos, ALT, AST, Total. Bili, TP)     Lipid Profile (Chol, Trig, HDL, LDL calc)     TSH with free T4 reflex     Comprehensive  metabolic panel (BMP + Alb, Alk Phos, ALT, AST, Total. Bili, TP)     Lipid Profile (Chol, Trig, HDL, LDL calc)     TSH with free T4 reflex   3. Mild persistent asthma without complication  J45.30 CBC with platelets and differential     Pneumococcal vaccine 23 valent PPSV23  (Pneumovax) [[id number]]     Asthma Action Plan (AAP)     CBC with platelets and differential   4. Environmental allergies  Z91.09     chronic congestion and sore throat, covid test neg 1 week ago   5. Irritability  R45.4 CBC with platelets and differential     TSH with free T4 reflex     CBC with platelets and differential     TSH with free T4 reflex    better , stable off meds   6. Adjustment disorder with mixed anxiety and depressed mood  F43.23 TSH with free T4 reflex     TSH with free T4 reflex    resolved   7. Chondromalacia of patella, unspecified laterality  M22.40     no pain   8. Hx of cold sores  Z86.19 Comprehensive metabolic panel (BMP + Alb, Alk Phos, ALT, AST, Total. Bili, TP)     Comprehensive metabolic panel (BMP + Alb, Alk Phos, ALT, AST, Total. Bili, TP)    on valtrex as needed   9. Excessive or frequent menstruation  N92.0 CBC with platelets and differential     TSH with free T4 reflex     Ferritin     CBC with platelets and differential     TSH with free T4 reflex     Ferritin    managed on bcp, has refills, under care of Gyn    10. Elevated blood pressure reading without diagnosis of hypertension  R03.0     advised to decrease slat adn work on loosing few pounds   11. Health care maintenance  Z00.00 REVIEW OF HEALTH MAINTENANCE PROTOCOL ORDERS   12. Advanced directives, counseling/discussion  Z71.89    13. Colon cancer screening  Z12.11 Adult Gastro Ref - Procedure Only   14. Need for pneumococcal vaccination  Z23 Pneumococcal vaccine 23 valent PPSV23  (Pneumovax) [[id number]]   15. Screening for diabetes mellitus  Z13.1 Hemoglobin A1c     Hemoglobin A1c   16. Screening for HIV (human immunodeficiency virus)  Z11.4 HIV Antigen  Antibody Combo     HIV Antigen Antibody Combo     Seen for preventive health and additional concerns today   Self breast check regularly   Consider referral to a  to assess personal risk if have any family hx of breast, ovarian or bowel cancer  Mammogram utd 3/2022  Pelvic / PAP / HPV due 2024    Asthma and allergies well controlled on allergy shots and Advair and albuterol as needed managed by allergist in [place]. No longer on Singulair.  Chronic congestion and sore throat COVID test -1-week ago.  Continue care with the allergist    Irritability better & adjustment disorder with mixed anxiety and depressed mood resolved.  No longer on fluoxetine since 3/2021 & doing well. .    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    History of cold sores May use Valtrex 2 tablets twice a day for 1 day as needed for breakout.    Menorrhagia stable on birth control managed by gynecology no side effects noted, no contraindications currently the blood pressure is trending up.    BMI up to 27 and blood pressure borderline elevated.  Recommend low-salt diet, limiting alcohol and caffeine doing some yoga & and losing some weight to help lower blood pressure. If Blood pressure trending up may have to review safety of continuing estrogen containing birth control. Continue care with your gynecologist    Multiple nevi, moles, freckles recommend sunscreen and seeing dermatology yearly for preventive skin check referral placed.    Health care maintenance reviewed  COVID-vaccine x3 up-to-date  Consider working on health care directives  Recommend Flu shot yearly  Recommend Tdap every 10 yrs and due next year  Recommend Shingles vaccine starting at 50   Pneumonia vaccine given today given history of asthma and can get the new version next year when we see you back again  If travelling out of the country recommend seeing the travel clinic to update appropriate shots etc  Labs today and will make further  "recommendations once reviewed    Return in 1 year for preventive physical and sooner in an office visit for any new concerns  Patient has been advised of split billing requirements and indicates understanding: Yes    COUNSELING:  Reviewed preventive health counseling, as reflected in patient instructions       Regular exercise       Healthy diet/nutrition       Vision screening       Immunizations    Vaccinated for: Pneumococcal         Alcohol Use       Contraception       Osteoporosis prevention/bone health       Colorectal Cancer Screening       HIV screenin x in teen years, 1 x in adult years, and at intervals if high risk       The 10-year ASCVD risk score (Iglesia FINNEY Jr., et al., 2013) is: 0.8%    Values used to calculate the score:      Age: 46 years      Sex: Female      Is Non- : No      Diabetic: No      Tobacco smoker: No      Systolic Blood Pressure: 130 mmHg      Is BP treated: No      HDL Cholesterol: 57 mg/dL      Total Cholesterol: 188 mg/dL       Advance Care Planning    Estimated body mass index is 27.46 kg/m  as calculated from the following:    Height as of this encounter: 1.73 m (5' 8.11\").    Weight as of this encounter: 82.2 kg (181 lb 3.2 oz).    Weight management plan: Discussed healthy diet and exercise guidelines    She reports that she has never smoked. She has never used smokeless tobacco.      Counseling Resources:  ATP IV Guidelines  Pooled Cohorts Equation Calculator  Breast Cancer Risk Calculator  BRCA-Related Cancer Risk Assessment: FHS-7 Tool  FRAX Risk Assessment  ICSI Preventive Guidelines  Dietary Guidelines for Americans, 2010  USDA's MyPlate  ASA Prophylaxis  Lung CA Screening    Beryl Patel MD  [facility]  "

## 2022-05-02 NOTE — PATIENT INSTRUCTIONS
Seen for preventive health and additional concerns today   Self breast check regularly   Consider referral to a  to assess personal risk if have any family hx of breast, ovarian or bowel cancer  Mammogram utd 3/2022  Pelvic / PAP / HPV due 2024    Asthma and allergies well controlled on allergy shots and Advair and albuterol as needed managed by allergist in [place].  No longer on Singulair.  Chronic congestion and sore throat COVID test -1-week ago.  Continue care with the allergist    Irritability better adjustment disorder with mixed anxiety and depressed mood resolved.  No longer on fluoxetine.    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    History of cold sores May use Valtrex 2 tablets twice a day for 1 day as needed for breakout.    Menorrhagia stable on birth control managed by gynecology no side effects noted, no contraindications currently the blood pressure is trending up.    BMI up to 27 and blood pressure borderline elevated.  Recommend low-salt diet, limiting alcohol and caffeine doing some yoga &and losing some weight to help lower blood pressure.  If Blood pressure trending up may have to review safety of continuing estrogen containing birth control.  Continue care with your gynecologist    Multiple nevi, moles, freckles recommend sunscreen and seeing dermatology yearly for preventive skin check referral placed.    Health care maintenance reviewed  COVID-vaccine x3 up-to-date  Consider working on health care directives  Recommend Flu shot yearly  Recommend Tdap every 10 yrs and due next year  Recommend Shingles vaccine starting at 50   Pneumonia vaccine given today given history of asthma and can get the new version next year when we see you back again  If travelling out of the country recommend seeing the travel clinic to update appropriate shots etc  Labs today and will make further recommendations once reviewed    Return in 1 year for preventive physical and sooner in an  office visit for any new concerns      Preventive Health Recommendations  Female Ages 40 to 49    Yearly exam:   See your health care provider every year in order to  Review health changes.   Discuss preventive care.    Review your medicines if your doctor prescribed any.    Get a Pap test every three years (unless you have an abnormal result and your provider advises testing more often).    If you get Pap tests with HPV test, you only need to test every 5 years, unless you have an abnormal result. You do not need a Pap test if your uterus was removed (hysterectomy) and you have not had cancer.    You should be tested each year for STDs (sexually transmitted diseases), if you're at risk.   Ask your doctor if you should have a mammogram.    Have a colonoscopy (test for colon cancer) if someone in your family has had colon cancer or polyps before age 50.     Have a cholesterol test every 5 years.     Have a diabetes test (fasting glucose) after age 45. If you are at risk for diabetes, you should have this test every 3 years.    Shots: Get a flu shot each year. Get a tetanus shot every 10 years.     Nutrition:   Eat at least 5 servings of fruits and vegetables each day.  Eat whole-grain bread, whole-wheat pasta and brown rice instead of white grains and rice.  Get adequate Calcium and Vitamin D.      Lifestyle  Exercise at least 150 minutes a week (an average of 30 minutes a day, 5 days a week). This will help you control your weight and prevent disease.  Limit alcohol to one drink per day.  No smoking.   Wear sunscreen to prevent skin cancer.  See your dentist every six months for an exam and cleaning.

## 2022-05-02 NOTE — RESULT ENCOUNTER NOTE
Lissette Ms. Harvey,  Your results came back and are within acceptable limits. -Normal red blood cell (hgb) levels, normal white blood cell count and normal platelet levels.  -A1C (diabetic test) is normal and indicates that your blood sugar has been in a normal range the last 3 months.. If you have any further concerns please do not hesitate to contact us by message, phone or making an appointment.  Have a good day   Dr Jorge PALAFOX

## 2022-05-03 LAB
ALBUMIN SERPL-MCNC: 3.8 G/DL (ref 3.4–5)
ALP SERPL-CCNC: 61 U/L (ref 40–150)
ALT SERPL W P-5'-P-CCNC: 34 U/L (ref 0–50)
ANION GAP SERPL CALCULATED.3IONS-SCNC: 8 MMOL/L (ref 3–14)
AST SERPL W P-5'-P-CCNC: 27 U/L (ref 0–45)
BILIRUB SERPL-MCNC: 0.3 MG/DL (ref 0.2–1.3)
BUN SERPL-MCNC: 9 MG/DL (ref 7–30)
CALCIUM SERPL-MCNC: 8.4 MG/DL (ref 8.5–10.1)
CHLORIDE BLD-SCNC: 104 MMOL/L (ref 94–109)
CHOLEST SERPL-MCNC: 200 MG/DL
CO2 SERPL-SCNC: 25 MMOL/L (ref 20–32)
CREAT SERPL-MCNC: 0.68 MG/DL (ref 0.52–1.04)
FASTING STATUS PATIENT QL REPORTED: NO
FERRITIN SERPL-MCNC: 51 NG/ML (ref 8–252)
GFR SERPL CREATININE-BSD FRML MDRD: >90 ML/MIN/1.73M2
GLUCOSE BLD-MCNC: 83 MG/DL (ref 70–99)
HDLC SERPL-MCNC: 52 MG/DL
HIV 1+2 AB+HIV1 P24 AG SERPL QL IA: NONREACTIVE
LDLC SERPL CALC-MCNC: 125 MG/DL
NONHDLC SERPL-MCNC: 148 MG/DL
POTASSIUM BLD-SCNC: 4.1 MMOL/L (ref 3.4–5.3)
PROT SERPL-MCNC: 7.4 G/DL (ref 6.8–8.8)
SODIUM SERPL-SCNC: 137 MMOL/L (ref 133–144)
TRIGL SERPL-MCNC: 115 MG/DL
TSH SERPL DL<=0.005 MIU/L-ACNC: 1.31 MU/L (ref 0.4–4)

## 2022-05-03 ASSESSMENT — ANXIETY QUESTIONNAIRES: GAD7 TOTAL SCORE: 0

## 2022-05-03 ASSESSMENT — PATIENT HEALTH QUESTIONNAIRE - PHQ9: SUM OF ALL RESPONSES TO PHQ QUESTIONS 1-9: 0

## 2022-05-03 NOTE — RESULT ENCOUNTER NOTE
Lissette Ms. Harvey,  Some of your results came back showing  -LDL(bad) cholesterol level is elevated which can increase your heart disease risk.  A diet high in fat and simple carbohydrates, genetics and being overweight can contribute to this. ADVISE: exercising 150 minutes of aerobic exercise per week (30 minutes for 5 days per week or 50 minutes for 3 days per week are options) and eating a low saturated fat/low carbohydrate diet are helpful to improve this. In 12 months, you should recheck your fasting cholesterol panel by scheduling a lab-only appointment.  -Liver and gallbladder tests are normal (ALT,AST, Alk phos, bilirubin), kidney function is normal (Cr, GFR), sodium is normal, potassium is normal, calcium is low normal ( increase calcium in diet) , glucose is normal.  -Ferritin (iron) level is normal.  . If you have any further concerns please do not hesitate to contact us by message, phone or making an appointment.  Have a good day   Dr Jorge PALAFOX

## 2022-05-03 NOTE — RESULT ENCOUNTER NOTE
Lissette AmosAlexa Harvey,  Your results came back and are within acceptable limits. -TSH (thyroid stimulating hormone) level is normal which indicates normal thyroid function.. If you have any further concerns please do not hesitate to contact us by message, phone or making an appointment.  Have a good day   Dr Jorge PALAFOX

## 2022-05-03 NOTE — RESULT ENCOUNTER NOTE
Lissette Ms. Harvey,  Your results came back and are within acceptable limits. -HIV test is normal.. If you have any further concerns please do not hesitate to contact us by message, phone or making an appointment.  Have a good day   Dr Jorge PALAFOX

## 2022-05-04 ENCOUNTER — TELEPHONE (OUTPATIENT)
Dept: GASTROENTEROLOGY | Facility: CLINIC | Age: 47
End: 2022-05-04
Payer: COMMERCIAL

## 2022-05-04 NOTE — TELEPHONE ENCOUNTER
Screening Questions  BlueKIND OF PREP RedLOCATION [review exclusion criteria] GreenSEDATION TYPE  1. Are you active on mychart? Y    2. Ordering/Referring Provider: Beryl Patel MD     3. What insurance is in the chart? PO     4. Do you have a legal guardian or medical Power of ?  Are you able to give consent for your medical care? Y   (Sedation review/consideration needed)    3.   BMI 27.4 [BMI OVER 40-EXTENDED PREP]  If greater than 40 review exclusion criteria [PAC APPT IF @ [facility]]      4. Have you had a positive covid test in the last 90 days? N     5.  Respiratory Screening :  [If yes to any of the following HOSPITAL setting only]     Do you use daily home oxygen? N  Do you have mod to severe Obstructive Sleep Apnea? N [OKAY @ [facility] SH PH RI]   Do you have Pulmonary Hypertension? N   Do you have UNCONTROLLED asthma? N      6.   Have you had a heart or lung transplant? N      7.   Are you currently on dialysis? N [ If yes, G-PREP & HOSPITAL setting only]     8.   Do you have chronic kidney disease? N[ If yes, G-PREP ]    9.   Have you had a stroke or Transient ischemic attack (TIA - aka  mini stroke ) within 6 months?  N(If yes, please review exclusion criteria)    10.   In the past 6 months, have you had any heart related issues including cardiomyopathy or heart attack? N          If yes, did it require cardiac stenting or other implantable device? N      11.   Do you have any implantable devices in your body (pacemaker, defib, LVAD)? N (If yes, please review exclusion criteria)    12.   Do you take nitroglycerin? N           If yes, how often? N  (if yes, HOSPITAL setting ONLY)    13.   Are you currently taking any blood thinners? N           [IF YES, INFORM PATIENT TO FOLLOW UP W/ ORDERING PROVIDER FOR BRIDGING INSTRUCTIONS]     14.   Do you have a diagnosis of diabetes? N [ If yes, G-PREP ]    15.   [FEMALES] Are you currently pregnant? N    If yes, how many weeks? N    16.   Are you  taking any prescription pain medications on a routine schedule?  N [ If yes, EXTENDED PREP.] [If yes, MAC]    17.   Do you have any chemical dependencies such as alcohol, street drugs, or methadone?  N [If yes, MAC]    18.   Do you have any history of post-traumatic stress syndrome, severe anxiety or history of psychosis?  N [If yes, MAC]    19.   Do you transfer independently?  Y    20.  On a regular basis do you go 3-5 days between bowel movements? N [ If yes, EXTENDED PREP.]    21.   Preferred LOCAL Pharmacy for Pre Prescription        [id number] [place], [place] - [facility]      Scheduling Details      Caller :  Abigail   (Please ask for phone number if not scheduled by patient)    Type of Procedure Scheduled: Lower Endoscopy [Colonoscopy]  Which Colonoscopy Prep was Sent?: ANGELEP    Surgeon: DENG MALDONADO  Date of Procedure: 6/23  Location: [facility]    Sedation Type: CS    Conscious Sedation- Needs  for 6 hours after the procedure  MAC/General-Needs  for 24 hours after procedure    [place] [facility] [place] and OR for MAC sedation: N  (advise patient they will need a pre-op prior to procedure -)      Informed patient they will need an adult  Y  Cannot take any type of public or medical transportation alone    Pre-Procedure Covid test to be completed at [facility] or Externally: 6/19    Confirmed Nurse will call to complete assessment Y    Additional comments:

## 2022-05-15 ENCOUNTER — MYC MEDICAL ADVICE (OUTPATIENT)
Dept: FAMILY MEDICINE | Facility: CLINIC | Age: 47
End: 2022-05-15
Payer: COMMERCIAL

## 2022-05-15 DIAGNOSIS — R45.4 IRRITABILITY: ICD-10-CM

## 2022-05-15 DIAGNOSIS — F43.23 ADJUSTMENT DISORDER WITH MIXED ANXIETY AND DEPRESSED MOOD: ICD-10-CM
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
## 2022-05-16 RX ORDER — FLUOXETINE 20 MG/1
20 TABLET, FILM COATED ORAL DAILY
Qty: 90 TABLET | Refills: 0 | Status: SHIPPED | OUTPATIENT
Start: 2022-05-16 | End: 2022-05-27

## 2022-05-16 NOTE — TELEPHONE ENCOUNTER
I just saw her on 5/2 for her physical. Noted no longer on fluoxetine 1 yr by choice as doing better and sadiq and phq was 0 each and felt no need for meds & reported was doing well  Now I see this request for a letter and no mention made about any unusual stress or affect on her mental health 14 days ago  I can write a letter saying has situational stress and may benefit from taking time off to help deal with her stress. But as far as other stuff she is not the only one having to deal with covid and risk of bringing it home to her family   This is how things are now given the pandemic. Its not going away anytime soon  Would she like a referral to a Counsellor to help deal with her stress or restart meds ? As new school year likely wont be any different

## 2022-05-16 NOTE — TELEPHONE ENCOUNTER
If leave does not get approved and desires to start fluoxetine  fluoxetine 20 mg sent in  If does start med please have her check in with us by video or phone apt to make adjustments  It can take 4 to 6 weeks to kick in   If desires a referral for a counselor. Please let us know

## 2022-05-16 NOTE — TELEPHONE ENCOUNTER
The letter was already given to her by my chart   I was wondering if she needed more help like counseling and restarting meds  Can you call her & clarify things

## 2022-05-16 NOTE — TELEPHONE ENCOUNTER
Called pt.     She would like to know if she should start back up on Fluoxetine IF this does not get approved.     If it does get approved, she doesn't think she'll need it.     She wants to know how to start back on it, can she just start taking the 20mg?     Kailey Trevino, MICHELLEN RN  [facility]
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
## 2022-05-17 NOTE — TELEPHONE ENCOUNTER
Writer responded via writewith.    MICHELLE ReedN, RN  [facility]

## 2022-05-27 ENCOUNTER — VIRTUAL VISIT (OUTPATIENT)
Dept: FAMILY MEDICINE | Facility: CLINIC | Age: 47
End: 2022-05-27
Payer: COMMERCIAL

## 2022-05-27 DIAGNOSIS — I10 BENIGN ESSENTIAL HYPERTENSION: Primary | ICD-10-CM

## 2022-05-27 PROCEDURE — 99213 OFFICE O/P EST LOW 20 MIN: CPT | Mod: 95 | Performed by: PHYSICIAN ASSISTANT

## 2022-05-27 RX ORDER — AMLODIPINE BESYLATE 2.5 MG/1
2.5 TABLET ORAL DAILY
Qty: 60 TABLET | Refills: 0 | Status: SHIPPED | OUTPATIENT
Start: 2022-05-27 | End: 2022-07-26

## 2022-05-27 RX ORDER — LEVONORGESTREL AND ETHINYL ESTRADIOL 0.15-0.03
1 KIT ORAL DAILY
COMMUNITY
End: 2022-09-01
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
## 2022-05-27 NOTE — PATIENT INSTRUCTIONS
Start amlodipin 2.5mg daily    Continue to take blood pressure once daily and record, bring to follow-up visit

## 2022-06-03 ENCOUNTER — HOSPITAL ENCOUNTER (EMERGENCY)
Facility: CLINIC | Age: 47
Discharge: LEFT BEFORE DISCHARGE | End: 2022-06-03
Payer: COMMERCIAL

## 2022-06-03 VITALS
DIASTOLIC BLOOD PRESSURE: 60 MMHG | BODY MASS INDEX: 27.28 KG/M2 | HEART RATE: 65 BPM | TEMPERATURE: 97.8 F | SYSTOLIC BLOOD PRESSURE: 120 MMHG | RESPIRATION RATE: 18 BRPM | OXYGEN SATURATION: 100 % | WEIGHT: 180 LBS
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
## 2022-06-03 NOTE — ED TRIAGE NOTES
1800: called patient into triage for provider assessment, patient was not in lobby, per triage nurse, patient left.

## 2022-06-08 NOTE — ED PROVIDER NOTES
Patient was seen by triage RN, however left the lobby prior to assessment by ED provider.     Lori Agrawal, SYDNEY CNP  06/08/22 1057

## 2022-06-10 ENCOUNTER — TELEPHONE (OUTPATIENT)
Dept: GASTROENTEROLOGY | Facility: CLINIC | Age: 47
End: 2022-06-10
Payer: COMMERCIAL

## 2022-06-10 NOTE — TELEPHONE ENCOUNTER
Pre assessment questions completed for upcoming colonoscopy procedure scheduled on 6.23.2022    Discussed at home rapid antigen COVID test 1-2 days prior to procedure.    Reviewed procedural arrival time 1100 and facility location [facility].    Designated  policy reviewed. Instructed to have someone stay 6 hours post procedure.     Anticoagulation/blood thinners? no    Electronic implanted devices? no    Reviewed Miralax/Magnesium citrate/Dulcolax prep instructions with patient. No fiber/iron supplements or foods that contain nuts/seeds prior to procedure.     Patient verbalized understanding and had no questions or concerns at this time.    Kanika Putnam RN

## 2022-06-20 ENCOUNTER — TELEPHONE (OUTPATIENT)
Dept: NURSING | Facility: CLINIC | Age: 47
End: 2022-06-20
Payer: COMMERCIAL

## 2022-06-20 NOTE — TELEPHONE ENCOUNTER
Outreach to patient to discuss COVID testing for upcoming procedure.     Spoke with: patient     Patient will complete testing outside of [facility]. No additional needs at this time. Home test is planned    Nohemy Chavez LPN

## 2022-06-23 ENCOUNTER — ANESTHESIA (OUTPATIENT)
Dept: SURGERY | Facility: AMBULATORY SURGERY CENTER | Age: 47
End: 2022-06-23
Payer: COMMERCIAL

## 2022-06-23 ENCOUNTER — ANESTHESIA EVENT (OUTPATIENT)
Dept: SURGERY | Facility: AMBULATORY SURGERY CENTER | Age: 47
End: 2022-06-23
Payer: COMMERCIAL

## 2022-06-23 ENCOUNTER — HOSPITAL ENCOUNTER (OUTPATIENT)
Facility: AMBULATORY SURGERY CENTER | Age: 47
Discharge: HOME | End: 2022-06-23
Attending: INTERNAL MEDICINE
Payer: COMMERCIAL

## 2022-06-23 VITALS — HEART RATE: 73 BPM

## 2022-06-23 VITALS
HEART RATE: 68 BPM | TEMPERATURE: 97.5 F | HEIGHT: 69 IN | DIASTOLIC BLOOD PRESSURE: 77 MMHG | BODY MASS INDEX: 23.7 KG/M2 | SYSTOLIC BLOOD PRESSURE: 120 MMHG | WEIGHT: 160 LBS | OXYGEN SATURATION: 100 % | RESPIRATION RATE: 16 BRPM

## 2022-06-23 LAB
COLONOSCOPY: NORMAL
HCG UR QL: NEGATIVE
INTERNAL QC OK POCT: NORMAL
POCT KIT EXPIRATION DATE: NORMAL
POCT KIT LOT NUMBER: NORMAL

## 2022-06-23 PROCEDURE — 88305 TISSUE EXAM BY PATHOLOGIST: CPT | Mod: 26 | Performed by: PATHOLOGY

## 2022-06-23 PROCEDURE — 81025 URINE PREGNANCY TEST: CPT | Performed by: PATHOLOGY

## 2022-06-23 PROCEDURE — 88305 TISSUE EXAM BY PATHOLOGIST: CPT | Mod: TC | Performed by: INTERNAL MEDICINE

## 2022-06-23 PROCEDURE — 45380 COLONOSCOPY AND BIOPSY: CPT | Mod: 33

## 2022-06-23 RX ORDER — ONDANSETRON 2 MG/ML
4 INJECTION INTRAMUSCULAR; INTRAVENOUS
Status: DISCONTINUED | OUTPATIENT
Start: 2022-06-23 | End: 2022-06-24 | Stop reason: HOSPADM

## 2022-06-23 RX ORDER — ONDANSETRON 2 MG/ML
4 INJECTION INTRAMUSCULAR; INTRAVENOUS EVERY 6 HOURS PRN
Status: CANCELLED | OUTPATIENT
Start: 2022-06-23

## 2022-06-23 RX ORDER — NALOXONE HYDROCHLORIDE 0.4 MG/ML
0.4 INJECTION, SOLUTION INTRAMUSCULAR; INTRAVENOUS; SUBCUTANEOUS
Status: CANCELLED | OUTPATIENT
Start: 2022-06-23

## 2022-06-23 RX ORDER — FLUMAZENIL 0.1 MG/ML
0.2 INJECTION, SOLUTION INTRAVENOUS
Status: CANCELLED | OUTPATIENT
Start: 2022-06-23 | End: 2022-06-24

## 2022-06-23 RX ORDER — PROCHLORPERAZINE MALEATE 10 MG
10 TABLET ORAL EVERY 6 HOURS PRN
Status: CANCELLED | OUTPATIENT
Start: 2022-06-23

## 2022-06-23 RX ORDER — ONDANSETRON 4 MG/1
4 TABLET, ORALLY DISINTEGRATING ORAL EVERY 6 HOURS PRN
Status: CANCELLED | OUTPATIENT
Start: 2022-06-23

## 2022-06-23 RX ORDER — NALOXONE HYDROCHLORIDE 0.4 MG/ML
0.2 INJECTION, SOLUTION INTRAMUSCULAR; INTRAVENOUS; SUBCUTANEOUS
Status: CANCELLED | OUTPATIENT
Start: 2022-06-23

## 2022-06-23 RX ORDER — PROPOFOL 10 MG/ML
INJECTION, EMULSION INTRAVENOUS CONTINUOUS PRN
Status: DISCONTINUED | OUTPATIENT
Start: 2022-06-23 | End: 2022-06-23

## 2022-06-23 RX ORDER — LIDOCAINE 40 MG/G
CREAM TOPICAL
Status: DISCONTINUED | OUTPATIENT
Start: 2022-06-23 | End: 2022-06-24 | Stop reason: HOSPADM

## 2022-06-23 RX ORDER — LIDOCAINE HYDROCHLORIDE 20 MG/ML
INJECTION, SOLUTION INFILTRATION; PERINEURAL PRN
Status: DISCONTINUED | OUTPATIENT
Start: 2022-06-23 | End: 2022-06-23

## 2022-06-23 RX ORDER — SODIUM CHLORIDE, SODIUM LACTATE, POTASSIUM CHLORIDE, CALCIUM CHLORIDE 600; 310; 30; 20 MG/100ML; MG/100ML; MG/100ML; MG/100ML
INJECTION, SOLUTION INTRAVENOUS CONTINUOUS
Status: DISCONTINUED | OUTPATIENT
Start: 2022-06-23 | End: 2022-06-24 | Stop reason: HOSPADM

## 2022-06-23 RX ORDER — PROPOFOL 10 MG/ML
INJECTION, EMULSION INTRAVENOUS PRN
Status: DISCONTINUED | OUTPATIENT
Start: 2022-06-23 | End: 2022-06-23

## 2022-06-23 RX ADMIN — PROPOFOL 60 MG: 10 INJECTION, EMULSION INTRAVENOUS at 12:18

## 2022-06-23 RX ADMIN — SODIUM CHLORIDE, SODIUM LACTATE, POTASSIUM CHLORIDE, CALCIUM CHLORIDE: 600; 310; 30; 20 INJECTION, SOLUTION INTRAVENOUS at 12:14

## 2022-06-23 RX ADMIN — LIDOCAINE HYDROCHLORIDE 50 MG: 20 INJECTION, SOLUTION INFILTRATION; PERINEURAL at 12:18

## 2022-06-23 RX ADMIN — PROPOFOL 150 MCG/KG/MIN: 10 INJECTION, EMULSION INTRAVENOUS at 12:18

## 2022-06-23 NOTE — ANESTHESIA CARE TRANSFER NOTE
Patient: Abigail Harvey    Procedure: Procedure(s):  COLONOSCOPY, WITH POLYPECTOMY       Diagnosis: Routine history and physical examination of adult [Z00.00]  Diagnosis Additional Information: No value filed.    Anesthesia Type:   No value filed.     Note:    Oropharynx: oropharynx clear of all foreign objects  Level of Consciousness: awake  Oxygen Supplementation: room air    Independent Airway: airway patency satisfactory and stable  Dentition: dentition unchanged  Vital Signs Stable: post-procedure vital signs reviewed and stable  Report to RN Given: handoff report given  Patient transferred to: Phase II    Handoff Report: Identifed the Patient, Identified the Reponsible Provider, Reviewed the pertinent medical history, Discussed the surgical course, Reviewed Intra-OP anesthesia mangement and issues during anesthesia, Set expectations for post-procedure period and Allowed opportunity for questions and acknowledgement of understanding      Vitals:  Vitals Value Taken Time   /77 06/23/22 1255   Temp 36.4  C (97.5  F) 06/23/22 1255   Pulse 78 06/23/22 1255   Resp 16 06/23/22 1255   SpO2 99 % 06/23/22 1255       Electronically Signed By: SYDNEY Rosen CRNA  June 23, 2022  12:56 PM

## 2022-06-23 NOTE — H&P
Abigail Harvey  [id number]  female  46 year old      Reason for procedure/surgery: screening    Patient Active Problem List   Diagnosis     Chondromalacia of patella     Excessive or frequent menstruation     Mild persistent asthma without complication     BMI 27.0-27.9,adult     Hx of cold sores       Past Surgical History:    Past Surgical History:   Procedure Laterality Date      SECTION      x2     HC TOOTH EXTRACTION W/FORCEP         Past Medical History:   Past Medical History:   Diagnosis Date     Adjustment disorder with mixed anxiety and depressed mood 11/10/2020     Injury of left thumb, initial encounter 2017     Irritability 11/10/2020     Lyme disease 2018     Thumb pain, left 3/23/2017       Social History:   Social History     Tobacco Use     Smoking status: Never Smoker     Smokeless tobacco: Never Used   Substance Use Topics     Alcohol use: Yes     Comment: occasional       Family History:   Family History   Problem Relation Age of Onset     Hypertension Mother      Heart Disease Father         heart attack     Cancer Father         lung       Allergies: No Known Allergies    Active Medications:   Current Outpatient Medications   Medication Sig Dispense Refill     ADVAIR -21 MCG/ACT inhaler Inhale 2 puffs into the lungs 2 times daily       amLODIPine (NORVASC) 2.5 MG tablet Take 1 tablet (2.5 mg) by mouth daily 60 tablet 0     levonorgestrel-ethinyl estradiol (SEASONALE) 0.15-0.03 MG tablet Take 1 tablet by mouth daily       albuterol (PROAIR HFA/PROVENTIL HFA/VENTOLIN HFA) 108 (90 Base) MCG/ACT inhaler Inhale 2 puffs into the lungs every 6 hours as needed for shortness of breath / dyspnea or wheezing 1 Inhaler 3     valACYclovir (VALTREX) 1000 mg tablet Take 2 tablets (2,000 mg) by mouth 2 times daily for 1 dose 2 tablet 1       Systemic Review:   CONSTITUTIONAL: NEGATIVE for fever, chills, change in weight  ENT/MOUTH: NEGATIVE for ear, mouth and throat problems  RESP:  "NEGATIVE for significant cough or SOB  CV: NEGATIVE for chest pain, palpitations or peripheral edema    Physical Examination:   Vital Signs: /89 (BP Location: Right arm)   Pulse 67   Temp 97.2  F (36.2  C) (Skin)   Resp 17   Ht 1.74 m (5' 8.5\")   Wt 72.6 kg (160 lb)   SpO2 100%   BMI 23.97 kg/m    GENERAL: healthy, alert and no distress  RESP: lclear  CV: rrr  ABDOMEN: soft      Plan: Appropriate to proceed as scheduled.      Marivel Benz MD  6/23/2022    PCP:  Beryl Patel    "

## 2022-06-23 NOTE — ANESTHESIA PREPROCEDURE EVALUATION
Anesthesia Pre-Procedure Evaluation    Patient: Abigail Harvey   MRN: [id number] : 1975        Procedure : Procedure(s):  COLONOSCOPY, WITH POLYPECTOMY          Past Medical History:   Diagnosis Date     Adjustment disorder with mixed anxiety and depressed mood 11/10/2020     Injury of left thumb, initial encounter 2017     Irritability 11/10/2020     Lyme disease 2018     Thumb pain, left 3/23/2017      Past Surgical History:   Procedure Laterality Date      SECTION      x2     HC TOOTH EXTRACTION W/FORCEP        No Known Allergies   Social History     Tobacco Use     Smoking status: Never Smoker     Smokeless tobacco: Never Used   Substance Use Topics     Alcohol use: Yes     Comment: occasional      Wt Readings from Last 1 Encounters:   22 72.6 kg (160 lb)           Physical Exam    Airway        Mallampati: II   TM distance: > 3 FB   Neck ROM: full   Mouth opening: > 3 cm    Respiratory Devices and Support         Dental  no notable dental history         Cardiovascular   cardiovascular exam normal          Pulmonary   pulmonary exam normal                OUTSIDE LABS:  CBC:   Lab Results   Component Value Date    WBC 7.1 2022    WBC 8.0 2018    HGB 14.0 2022    HGB 13.6 2018    HCT 40.9 2022    HCT 39.6 2018     2022     2018     BMP:   Lab Results   Component Value Date     2022     2018    POTASSIUM 4.1 2022    POTASSIUM 3.8 2018    CHLORIDE 104 2022    CHLORIDE 106 2018    CO2 25 2022    CO2 25 2018    BUN 9 2022    BUN 9 2018    CR 0.68 2022    CR 0.63 2018    GLC 83 2022     (H) 2018     COAGS: No results found for: PTT, INR, FIBR  POC:   Lab Results   Component Value Date    HCG Negative 2022     HEPATIC:   Lab Results   Component Value Date    ALBUMIN 3.8 2022    PROTTOTAL 7.4 2022    ALT 34  05/02/2022    AST 27 05/02/2022    ALKPHOS 61 05/02/2022    BILITOTAL 0.3 05/02/2022     OTHER:   Lab Results   Component Value Date    A1C 5.0 05/02/2022    SOPHIE 8.4 (L) 05/02/2022    TSH 1.31 05/02/2022       Anesthesia Plan    ASA Status:  2   NPO Status:  NPO Appropriate    Anesthesia Type: MAC.     - Reason for MAC: straight local not clinically adequate   Induction: Intravenous, Propofol.   Maintenance: TIVA.        Consents    Anesthesia Plan(s) and associated risks, benefits, and realistic alternatives discussed. Questions answered and patient/representative(s) expressed understanding.    - Discussed:     - Discussed with:  Patient      - Extended Intubation/Ventilatory Support Discussed: No.      - Patient is DNR/DNI Status: No    Use of blood products discussed: No .     Postoperative Care       PONV prophylaxis: Ondansetron (or other 5HT-3), Background Propofol Infusion     Comments:                Serafin Espinal MD

## 2022-06-23 NOTE — ANESTHESIA POSTPROCEDURE EVALUATION
Patient: Abigail Harvey    Procedure: Procedure(s):  COLONOSCOPY, WITH POLYPECTOMY       Anesthesia Type:  MAC    Note:  Disposition: Outpatient   Postop Pain Control: Uneventful            Sign Out: Well controlled pain   PONV:    Neuro/Psych: Uneventful            Sign Out: Acceptable/Baseline neuro status   Airway/Respiratory: Uneventful            Sign Out: Acceptable/Baseline resp. status   CV/Hemodynamics: Uneventful            Sign Out: Acceptable CV status; No obvious hypovolemia; No obvious fluid overload   Other NRE:    DID A NON-ROUTINE EVENT OCCUR?            Last vitals:  Vitals Value Taken Time   /77 06/23/22 1309   Temp 36.4  C (97.5  F) 06/23/22 1309   Pulse 68 06/23/22 1309   Resp 16 06/23/22 1309   SpO2 100 % 06/23/22 1309       Electronically Signed By: Serafin Espinal MD  June 23, 2022  3:39 PM

## 2022-06-27 LAB
PATH REPORT.COMMENTS IMP SPEC: NORMAL
PATH REPORT.COMMENTS IMP SPEC: NORMAL
PATH REPORT.FINAL DX SPEC: NORMAL
PATH REPORT.GROSS SPEC: NORMAL
PATH REPORT.MICROSCOPIC SPEC OTHER STN: NORMAL
PATH REPORT.RELEVANT HX SPEC: NORMAL
PHOTO IMAGE: NORMAL

## 2022-06-29 PROBLEM — F43.23 ADJUSTMENT DISORDER WITH MIXED ANXIETY AND DEPRESSED MOOD: Status: ACTIVE | Noted: 2020-11-10

## 2022-06-29 PROBLEM — D12.6 SERRATED ADENOMA OF COLON: Status: ACTIVE | Noted: 2022-06-29

## 2022-07-21 DIAGNOSIS — I10 BENIGN ESSENTIAL HYPERTENSION: ICD-10-CM

## 2022-07-21 NOTE — TELEPHONE ENCOUNTER
Pt went to get her glasses rx updated. Nothing usual at all. The eye clinic said she needed the referral. She doesn't have glaucoma but she is to go back in 6 months for recheck .     [facility] called, their fx is [phone]     She was seen by optometrist. [facility] may not pay if she has medical issue.     Dr Patel , referral is queued up if you want to sign it.    Lisa Villanueva, RN, BSN                Received another call from Tanya Walton  Attempted to call her back  Left a detailed message providing appt date and time as well as address  Also directed to PCP or trauma for assistance with refill of pain medication

## 2022-07-26 DIAGNOSIS — I10 BENIGN ESSENTIAL HYPERTENSION: ICD-10-CM

## 2022-07-26 RX ORDER — AMLODIPINE BESYLATE 2.5 MG/1
2.5 TABLET ORAL DAILY
Qty: 90 TABLET | Refills: 0 | Status: SHIPPED | OUTPATIENT
Start: 2022-07-26 | End: 2022-11-22

## 2022-07-26 RX ORDER — AMLODIPINE BESYLATE 2.5 MG/1
2.5 TABLET ORAL DAILY
Qty: 60 TABLET | Refills: 0 | Status: CANCELLED | OUTPATIENT
Start: 2022-07-26

## 2022-07-26 NOTE — TELEPHONE ENCOUNTER
Reason for Call: Other prescription    Detailed comments: Patient is requesting a refill on this medication. Please assist. Thanks!    Phone Number Patient can be reached at: Cell number on file:    Telephone Information:   Mobile [phone]     Best Time: Any    Can we leave a detailed message on this number? YES    Call taken on 7/26/2022 at 8:49 AM by Carmen Hu

## 2022-07-29 NOTE — TELEPHONE ENCOUNTER
I see order sent same day as this request came in from patient. Same pharmacy.    Rosy Varela RN    [facility]

## 2022-09-01 ENCOUNTER — OFFICE VISIT (OUTPATIENT)
Dept: INTERNAL MEDICINE | Facility: CLINIC | Age: 47
End: 2022-09-01
Payer: COMMERCIAL

## 2022-09-01 VITALS
SYSTOLIC BLOOD PRESSURE: 114 MMHG | BODY MASS INDEX: 25.49 KG/M2 | OXYGEN SATURATION: 96 % | HEART RATE: 73 BPM | DIASTOLIC BLOOD PRESSURE: 80 MMHG | WEIGHT: 170.1 LBS

## 2022-09-01 DIAGNOSIS — I10 PRIMARY HYPERTENSION: ICD-10-CM

## 2022-09-01 DIAGNOSIS — S93.402A SPRAIN OF LEFT ANKLE, UNSPECIFIED LIGAMENT, INITIAL ENCOUNTER: Primary | ICD-10-CM

## 2022-09-01 PROCEDURE — 99214 OFFICE O/P EST MOD 30 MIN: CPT | Performed by: INTERNAL MEDICINE

## 2022-09-01 NOTE — PATIENT INSTRUCTIONS
Goal b/p <130/80. You could try off amlodipine and monitor b/p at home.     2. Left ankle sprain: have PT done. If still having pain afeter 6 weeks, see ortho.     3. Get flu vaccine in October.

## 2022-09-01 NOTE — PROGRESS NOTES
Assessment & Plan     Sprain of left ankle, unspecified ligament, initial encounter  Full range of motion in her ankle, no significant point tenderness to palpation.  Discussed to start with physical therapy if pain persist, we can refer her to orthopedist  - Physical Therapy Referral; Future    Primary hypertension  Since she was started on amlodipine 2.5 mg she lost 10 pounds and also switched from dual birth control to progesterone only birth control.  She would like to see whether her blood pressure is still in the normal range of blood pressure medication.  Discussed that it is okay to stop amlodipine and monitor blood pressures at home.  Goal blood pressures are less than 130/80.  If blood pressures are persistently above that she will go back on her amlodipine.  Also discussed if her blood pressure off amlodipine is normal and she starts on dual birth control again, potentially can also increase her blood pressure due to side effects from it.    Blanca Sterling MD  [facility]   Abigail is a 46 year old, presenting for the following health issues:  Hypertension (B/P Medication recheck 3 month ) and Trauma (Twisted ankle in July )    Abigail was started on amlodipine 2.5 mg back in May due to hypertension.  At that time her dual birth control was changed to Micronor birth control due to hypertension.  She also improve her diet and lost some weight.  Currently she is wondering if she could come off of amlodipine.  She feels that increased stress at work also was contributing to hypertension and currently she switched jobs and feels quite well at her annual job environment.  Denies excessive salt or alcohol intake, she lost 10 pounds, she does not snore at night.    She does have history of asthma and currently is doing allergy shots which are controlling her asthma well, no recent exacerbations.    She also had a bad sprain of her left ankle in July.  Currently  symptoms are much improved but she still has mild residual swelling and slight tenderness.    History of Present Illness       Hypertension: She presents for follow up of hypertension.  She does not check blood pressure  regularly outside of the clinic. Outside blood pressures have been over 140/90. She does not follow a low salt diet.         Review of Systems   As above      Objective    /80 (BP Location: Left arm, Patient Position: Sitting, Cuff Size: Adult Regular)   Pulse 73   Wt 77.2 kg (170 lb 1.6 oz)   SpO2 96%   BMI 25.49 kg/m    Body mass index is 25.49 kg/m .  Physical Exam   General: well appearing female, alert and oriented x3  EYES: Eyelids, conjunctiva, and sclera were normal. Pupils were normal.   HEAD, EARS, NOSE, MOUTH, AND THROAT: no cervical LAD, no thyromegaly or nodules appreciated. TMs are visualized and normal, oropharynx is clear.  RESPIRATORY: respirations non labored, CTA bl, no wheezes, rales, no forced expiratory wheezing.  CARDIOVASCULAR: Heart rate and rhythm were normal. No murmurs, rubs,gallops. There was no peripheral edema.  GASTROINTESTINAL: Positive bowel sounds, abdomen is soft, non tender, non distended.     MUSCULOSKELETAL: Muscle mass was normal for age. No joint synovitis or deformity.  LYMPHATIC: There were no enlarged nodes palpable.  SKIN/HAIR/NAILS: Skin color was normal.  No rashes.  NEUROLOGIC: The patient was alert and oriented.  Speech was normal.  There is no facial asymmetry.   PSYCHIATRIC:  Mood and affect were normal.

## 2022-09-29 ENCOUNTER — THERAPY VISIT (OUTPATIENT)
Dept: PHYSICAL THERAPY | Facility: CLINIC | Age: 47
End: 2022-09-29
Payer: COMMERCIAL

## 2022-09-29 DIAGNOSIS — S93.402A SPRAIN OF LEFT ANKLE, UNSPECIFIED LIGAMENT, INITIAL ENCOUNTER: ICD-10-CM

## 2022-09-29 DIAGNOSIS — M25.572 PAIN IN JOINT INVOLVING ANKLE AND FOOT, LEFT: Primary | ICD-10-CM

## 2022-09-29 PROCEDURE — 97161 PT EVAL LOW COMPLEX 20 MIN: CPT | Mod: GP | Performed by: PHYSICAL THERAPIST

## 2022-09-29 PROCEDURE — 97140 MANUAL THERAPY 1/> REGIONS: CPT | Mod: GP | Performed by: PHYSICAL THERAPIST

## 2022-09-29 PROCEDURE — 97110 THERAPEUTIC EXERCISES: CPT | Mod: GP | Performed by: PHYSICAL THERAPIST

## 2022-09-29 NOTE — PROGRESS NOTES
Physical Therapy Initial Evaluation  Subjective:  The history is provided by the patient.   Patient Health History  Abigail Harvey being seen for L lateral ankle sprain.     Problem began: 9/1/2022.   Problem occurred: initial injury in July   Pain is reported as 2/10 on pain scale.  General health as reported by patient is good.  Pertinent medical history includes: asthma and high blood pressure.                Current occupation is Teacher.   Primary job tasks include:  Prolonged sitting and computer work.   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                Therapist Generated HPI Evaluation         Type of problem:  Left ankle.    This is a new condition.  Condition occurred with:  A fall/slip (stepped wrong off of a step).  Where condition occurred: in the community.  Patient reports pain:  Lateral.  Pain is described as aching and is intermittent.  Pain radiates to:  Ankle. Pain is worse in the P.M..  Since onset symptoms are unchanged.  Associated symptoms:  Loss of motion/stiffness, edema and numbness (anterolateral ankle numbness). Symptoms are exacerbated by standing, walking and certain positions  and relieved by ice and heat.  Special tests included:  X-ray.                            Objective:  Standing Alignment:        Lumbar:  Anterior pelvic tilt      Knee:  Genu recuvatum L and genu recurvatum R    General Deviations:  Toe out R and toe out L  Gait:    Weight Bearing Status:  WBAT     Deviations:  Pelvis:  Incr pelvic rotationHip:  Trendelenberg L and Trendelenberg RKnee:  Knee extension decr R          Ankle/Foot Evaluation  ROM:    AROM:    Dorsiflexion: Left:   Pain with end range PF and DF  Right:                  LIGAMENT TESTING:   Anterior Drawer (ATF) Left: pos   Anterior Drawer (ATF) Right: neg  Posterior Drawer (PTF) Left: neg   Posterior Drawer (PTF) Right: neg  Varus Stress (Calc Fib) Left: pos    Varus Stress (Calc Fib) Right: neg          PALPATION:   Left ankle tenderness  present at:  peroneals; anterior talofibular ligament; calcaneofibular ligament and lateral malleolus    EDEMA:   Left ankle edema present at: anterior and general          MOBILITY TESTING:       Talocrural Left: hypermobile      Subtalar Left: hypermobile          FUNCTIONAL TESTS:         Quad:      Bilateral Leg Squat:  Control is femoral IR and excessive anterior knee excursion    Proprioception:  Stork Balance Test: Left: inc dynamic sway                                                      General     ROS    Assessment/Plan:    Patient is a 46 year old female with left side ankle complaints.    Patient has the following significant findings with corresponding treatment plan.                Diagnosis 1:  L Lateral ankle sprain    Pain -  hot/cold therapy, manual therapy, self management, education and home program  Decreased strength - therapeutic exercise, therapeutic activities and home program  Decreased proprioception - neuro re-education, gait training, therapeutic activities and home program  Impaired gait - gait training and home program  Instability -  Therapeutic Activity  Therapeutic Exercise  Neuromuscular Re-education  home program    Therapy Evaluation Codes:   Cumulative Therapy Evaluation is: Low complexity.    Previous and current functional limitations:  (See Goal Flow Sheet for this information)    Short term and Long term goals: (See Goal Flow Sheet for this information)     Communication ability:  Patient appears to be able to clearly communicate and understand verbal and written communication and follow directions correctly.  Treatment Explanation - The following has been discussed with the patient:   RX ordered/plan of care  Anticipated outcomes  Possible risks and side effects  This patient would benefit from PT intervention to resume normal activities.   Rehab potential is good.    Frequency:  1 X week, once daily  Duration:  for 4 weeks tapering to 2 X a month over 6 weeks  Discharge  Plan:  Achieve all LTG.  Independent in home treatment program.  Reach maximal therapeutic benefit.    Please refer to the daily flowsheet for treatment today, total treatment time and time spent performing 1:1 timed codes.

## 2022-09-30 PROBLEM — S93.402A SPRAIN OF LEFT ANKLE, UNSPECIFIED LIGAMENT, INITIAL ENCOUNTER: Status: ACTIVE | Noted: 2022-09-30

## 2022-09-30 PROBLEM — M25.572 PAIN IN JOINT INVOLVING ANKLE AND FOOT, LEFT: Status: ACTIVE | Noted: 2022-09-30

## 2022-09-30 NOTE — PROGRESS NOTES
Physical Therapy Initial Evaluation  Subjective:    Patient Health History             Pertinent medical history includes: asthma and high blood pressure.        Surgeries include:  Other (2 cesareans).        Current occupation is Teacher.   Primary job tasks include:  Computer work and prolonged sitting.                                    Objective:  System    Physical Exam    General     ROS    Assessment/Plan:

## 2022-10-07 ENCOUNTER — OFFICE VISIT (OUTPATIENT)
Dept: OBGYN | Facility: CLINIC | Age: 47
End: 2022-10-07
Payer: COMMERCIAL

## 2022-10-07 VITALS
OXYGEN SATURATION: 99 % | HEART RATE: 64 BPM | BODY MASS INDEX: 26.25 KG/M2 | DIASTOLIC BLOOD PRESSURE: 97 MMHG | WEIGHT: 175.2 LBS | SYSTOLIC BLOOD PRESSURE: 159 MMHG

## 2022-10-07 DIAGNOSIS — N93.9 ABNORMAL UTERINE BLEEDING (AUB): Primary | ICD-10-CM

## 2022-10-07 DIAGNOSIS — N92.0 EXCESSIVE OR FREQUENT MENSTRUATION: ICD-10-CM

## 2022-10-07 PROCEDURE — 99213 OFFICE O/P EST LOW 20 MIN: CPT | Performed by: OBSTETRICS & GYNECOLOGY

## 2022-10-07 RX ORDER — ACETAMINOPHEN AND CODEINE PHOSPHATE 120; 12 MG/5ML; MG/5ML
0.35 SOLUTION ORAL DAILY
Qty: 90 TABLET | Refills: 3 | Status: SHIPPED | OUTPATIENT
Start: 2022-10-07 | End: 2023-11-30

## 2022-10-07 NOTE — PROGRESS NOTES
[facility]- OBGYN    CC: AUB    S:Abigail Harvey is a 46 year old  who presents today for birth control consult.  Patient reports she was on combined OCP for years with good control of periods.  She has always had heavy periods.  She has taken continuous OCPs until this was discontinued by her PCP due to development of CHTN.  She was transitioned to POPs.  She has been taking this 2 months now and is not satisfied with the management of menses.  Had a period  that was very heavy and it lasted 1.5 weeks.  This was her first period in 10 years.  Goal is for no periods if possible.      OBGYN Hx:   OB History    Para Term  AB Living   3 2 2 0 1 2   SAB IAB Ectopic Multiple Live Births   1 0 0 0 2      # Outcome Date GA Lbr Jos/2nd Weight Sex Delivery Anes PTL Lv   3 SAB            2 Term      CS-LTranv   MADELIN   1 Term      CS-LTranv   MADELIN       Patient's last menstrual period was 2022 (exact date).  Menses:see HPI  Pap hx:3/6/2020 NILM HPV negative     PMH: CHTN, depression  PSH: , tooth extraction  Meds: micronor  Allergies:NKDA  SH: denies any tobacco use    O: Patient Vitals for the past 24 hrs:   BP Pulse SpO2 Weight   10/07/22 1541 (!) 159/97 64 99 % 79.5 kg (175 lb 3.2 oz)   ]  Exam:  General- awake, alert, answering questions appropriately, appears comfortable  CV- regular rate  Lung- breathing comfortably on room air    A&P: Abigail Harvey is a 46 year old  who presents today for birth control consult.     (N93.9) Abnormal uterine bleeding (AUB)  (primary encounter diagnosis)  Comment: Patient with long history of heavy menses.  Previously well controlled with OCPs.  Reviewed with patient the estrogen component of OCPs are no longer a safe or recommended option given her development of hypertension.  Discussed ultrasound to evaluate structural contributors to heavy menses such as endometrial polyps or fibroids.  Patient agreeable.  Plan: US Pelvic  Complete with Transvaginal    (N92.0) Excessive or frequent menstruation  Comment: Discussed options for progesterone only contraceptive options.  Discussed Depo provera and Mirena IUD in depth due to patient's goal of amenorrhea.  Following discussion patient plans Mirena IUD insertion.  Discussed if multiple medical options are failed, definitive management of AUB would be with hysterectomy.  Will plan to continue POPs until IUD insertion.  Plan: norethindrone (MICRONOR) 0.35 MG tablet          Nyasia Jacques MD

## 2022-10-26 ENCOUNTER — THERAPY VISIT (OUTPATIENT)
Dept: PHYSICAL THERAPY | Facility: CLINIC | Age: 47
End: 2022-10-26
Payer: COMMERCIAL

## 2022-10-26 DIAGNOSIS — S93.402A SPRAIN OF LEFT ANKLE, UNSPECIFIED LIGAMENT, INITIAL ENCOUNTER: Primary | ICD-10-CM

## 2022-10-26 DIAGNOSIS — M25.572 PAIN IN JOINT INVOLVING ANKLE AND FOOT, LEFT: ICD-10-CM

## 2022-10-26 PROCEDURE — 97110 THERAPEUTIC EXERCISES: CPT | Mod: GP | Performed by: PHYSICAL THERAPIST

## 2022-10-26 PROCEDURE — 97140 MANUAL THERAPY 1/> REGIONS: CPT | Mod: GP | Performed by: PHYSICAL THERAPIST

## 2022-11-10 ENCOUNTER — THERAPY VISIT (OUTPATIENT)
Dept: PHYSICAL THERAPY | Facility: CLINIC | Age: 47
End: 2022-11-10
Payer: COMMERCIAL

## 2022-11-10 DIAGNOSIS — S93.402A SPRAIN OF LEFT ANKLE, UNSPECIFIED LIGAMENT, INITIAL ENCOUNTER: Primary | ICD-10-CM

## 2022-11-10 DIAGNOSIS — M25.572 PAIN IN JOINT INVOLVING ANKLE AND FOOT, LEFT: ICD-10-CM

## 2022-11-10 PROCEDURE — 97140 MANUAL THERAPY 1/> REGIONS: CPT | Mod: GP | Performed by: PHYSICAL THERAPIST

## 2022-11-10 PROCEDURE — 97110 THERAPEUTIC EXERCISES: CPT | Mod: GP | Performed by: PHYSICAL THERAPIST

## 2022-11-19 ENCOUNTER — HEALTH MAINTENANCE LETTER (OUTPATIENT)
Age: 47
End: 2022-11-19

## 2022-11-22 ENCOUNTER — ANCILLARY PROCEDURE (OUTPATIENT)
Dept: ULTRASOUND IMAGING | Facility: CLINIC | Age: 47
End: 2022-11-22
Attending: OBSTETRICS & GYNECOLOGY
Payer: COMMERCIAL

## 2022-11-22 ENCOUNTER — OFFICE VISIT (OUTPATIENT)
Dept: OBGYN | Facility: CLINIC | Age: 47
End: 2022-11-22
Attending: OBSTETRICS & GYNECOLOGY
Payer: COMMERCIAL

## 2022-11-22 VITALS
BODY MASS INDEX: 26.73 KG/M2 | WEIGHT: 178.4 LBS | OXYGEN SATURATION: 99 % | DIASTOLIC BLOOD PRESSURE: 93 MMHG | HEART RATE: 69 BPM | SYSTOLIC BLOOD PRESSURE: 144 MMHG

## 2022-11-22 DIAGNOSIS — N93.9 ABNORMAL UTERINE BLEEDING (AUB): ICD-10-CM

## 2022-11-22 DIAGNOSIS — N84.0 ENDOMETRIAL POLYP: Primary | ICD-10-CM

## 2022-11-22 DIAGNOSIS — Z12.31 ENCOUNTER FOR SCREENING MAMMOGRAM FOR BREAST CANCER: ICD-10-CM

## 2022-11-22 PROCEDURE — 76830 TRANSVAGINAL US NON-OB: CPT | Performed by: OBSTETRICS & GYNECOLOGY

## 2022-11-22 PROCEDURE — 99213 OFFICE O/P EST LOW 20 MIN: CPT | Performed by: OBSTETRICS & GYNECOLOGY

## 2022-11-22 PROCEDURE — 76856 US EXAM PELVIC COMPLETE: CPT | Performed by: OBSTETRICS & GYNECOLOGY

## 2022-11-22 RX ORDER — COVID-19 MOLECULAR TEST ASSAY
KIT MISCELLANEOUS
COMMUNITY
Start: 2022-10-03 | End: 2023-03-28

## 2022-11-22 NOTE — PROGRESS NOTES
[facility]- OBGYN    CC: ultrasound review    S:Abigail Harvey is a 46 year old  who presents today for ultrasound review for work up of AUB.  Patient reports bleeding is ok.  She is also concerned about an episode of breast pain last week.  She had 5 days of back breast pain especially when not wearing a bra.  Then it concentrated to mostly nipple pain and now it is resolved.  She denies any breast lumps.    O: Patient Vitals for the past 24 hrs:   BP Pulse SpO2 Weight   22 1419 (!) 144/93 69 99 % 80.9 kg (178 lb 6.4 oz)   ]  Exam:  General- awake, alert, answering questions appropriately, appears comfortable  CV- regular rate  Lung- breathing comfortably on room air  Breasts: normal without suspicious masses, skin changes or axillary nodes.    Imaging and Labs:  Gynecological Ultrasound Report  Pelvic U/S - Transabdominal and Transvaginal   [facility]  Referring Provider: Nyasia Jacques MD   Sonographer:  Marycarmen Paris RDMS  Indication: Bleeding/Menses- Dysfunctional uterine bleeding (DUB) and Abnormal uterine bleeding (AUB)  LMP: Patient's last menstrual period was 2022 (exact date).     Gynecological Ultrasonography:   Uterus: anteverted and retroflexed. Contour is smooth/regular.  Size: 9.8 x 4.4 x 4.7 cm  Endometrium: Thickness Total 15.0 mm  Findings: heterogegeous, echogenic area in FRANCISCO/ cervix = 0.7x 1.0x 1.2cm     Right Ovary: 2.6 x 2.4 cm. Wnl  Left Ovary: 2.4 x 1.6 x cm. Wnl  Cul de Sac Free Fluid: No free fluid     Impression:      The uterus and ovaries were visualized and no abnormalities were seen.  There is a likely polyp within the lower uterus/upper cervix     Kiesha Davis MD    A&P: Abigail Harvey is a 46 year old  who presents today for ultrasound review for work up of AUB, ultrasound findings consistent with endometrial polyp.    (N84.0) Endometrial polyp  (primary encounter diagnosis)  Comment:  Discussed ultrasound findings with patient.  Reviewed endometrial polyps can cause heavy, campy, and irregular vagina bleeding.  Discussed removal with hysteroscopy using morcellator, representative endometrial sampling, and Mirena IUD insertion during case to reduce future vaginal bleeding.  Discussed procedure scheduling, operative steps, pre-op expectations, and recovery expectations.  Patient would like to move forward with this.  Case request placed.   Plan: Case Request: HYSTEROSCOPY, WITH DILATION AND         CURETTAGE OF UTERUS USING MORCELLATOR add and Mirena intrauterine device insertion    (Z12.31) Encounter for screening mammogram for breast cancer  Comment: Patient with normal breast exam.  Symptoms completely resolved.  Due for mammogram next March, ordered.  Plan: *MA Screening Digital Bilateral          Nyasia Jacques MD

## 2022-11-23 ENCOUNTER — TELEPHONE (OUTPATIENT)
Dept: OBGYN | Facility: CLINIC | Age: 47
End: 2022-11-23

## 2022-11-23 ENCOUNTER — MYC MEDICAL ADVICE (OUTPATIENT)
Dept: FAMILY MEDICINE | Facility: CLINIC | Age: 47
End: 2022-11-23

## 2022-11-23 NOTE — TELEPHONE ENCOUNTER
LVMTCB to discuss surgery scheduling with  on 01/27/23.     Sylvia  She/her/hers  [facility] Scheduler

## 2022-11-28 NOTE — TELEPHONE ENCOUNTER
Type of surgery: gyn  Location of surgery: [facility]  Date and time of surgery: 01/27/23 2:15PM  Surgeon: BONIFACIO Jacques  Pre-Op Appt Date: 12/28/22 Anthony Beltran  Post-Op Appt Date: patient will wait to schedule   Packet sent out: Yes  Pre-cert/Authorization completed:  Not Applicable  Date: 11/28/22     MELONIE Ward  She/her/hers  [facility] Scheduler

## 2022-11-29 NOTE — TELEPHONE ENCOUNTER
Dr. Patel-writer would like to clarify pre-op and blood pressure can be addressed during same visit?  12/28/22 visit is for pre-op.    Thank you!  MICHELLE ReedN, RN-BC  [facility]

## 2022-11-29 NOTE — TELEPHONE ENCOUNTER
"Sending this question to Dr. Patel.    \"Lissette,   Could you please review my blood pressure data and advise whether I should resume the medication. I had a clinic visit yesterday and it was 143/90. \"    BP Readings from Last 3 Encounters:   11/22/22 (!) 144/93   10/07/22 (!) 159/97   09/01/22 114/80           MARLENA De La Paz    "

## 2022-11-29 NOTE — TELEPHONE ENCOUNTER
May be best if she makes an apt in person soon with one of my colleagues to address high blood pressure

## 2022-11-29 NOTE — TELEPHONE ENCOUNTER
Based on blood pressures recorded recommend resuming blood pressure meds.  I have not seen or managed her on these medications,  I see she has an appointment with Dr. Beltran coming up which is excellent and she should keep that appointment to discuss her blood pressure and manage her medication

## 2022-11-29 NOTE — TELEPHONE ENCOUNTER
Dr. Patel-may approval required spot be offered?  Otherwise we are booking out into January 2023.    Thank you!  MICHELLE ReedN, RN-BC  [facility]

## 2022-11-30 NOTE — TELEPHONE ENCOUNTER
Writer responded via [employer].    MICHELLE ReedN, RN-BC  [facility]

## 2022-11-30 NOTE — TELEPHONE ENCOUNTER
Writer responded via [employer].    MICHELLE ReedN, RN-BC  [facility]

## 2022-12-26 ENCOUNTER — OFFICE VISIT (OUTPATIENT)
Dept: FAMILY MEDICINE | Facility: CLINIC | Age: 47
End: 2022-12-26
Payer: COMMERCIAL

## 2022-12-26 VITALS
SYSTOLIC BLOOD PRESSURE: 118 MMHG | OXYGEN SATURATION: 97 % | HEART RATE: 79 BPM | TEMPERATURE: 97.2 F | HEIGHT: 69 IN | BODY MASS INDEX: 26.36 KG/M2 | DIASTOLIC BLOOD PRESSURE: 79 MMHG | WEIGHT: 178 LBS

## 2022-12-26 DIAGNOSIS — I10 ESSENTIAL HYPERTENSION: Primary | ICD-10-CM

## 2022-12-26 LAB
ANION GAP SERPL CALCULATED.3IONS-SCNC: 11 MMOL/L (ref 7–15)
BUN SERPL-MCNC: 8 MG/DL (ref 6–20)
CALCIUM SERPL-MCNC: 8.9 MG/DL (ref 8.6–10)
CHLORIDE SERPL-SCNC: 102 MMOL/L (ref 98–107)
CREAT SERPL-MCNC: 0.71 MG/DL (ref 0.51–0.95)
DEPRECATED HCO3 PLAS-SCNC: 25 MMOL/L (ref 22–29)
GFR SERPL CREATININE-BSD FRML MDRD: >90 ML/MIN/1.73M2
GLUCOSE SERPL-MCNC: 95 MG/DL (ref 70–99)
HCT VFR BLD AUTO: 31.9 % (ref 35–47)
HGB BLD-MCNC: 10.7 G/DL (ref 11.7–15.7)
POTASSIUM SERPL-SCNC: 4.5 MMOL/L (ref 3.4–5.3)
SODIUM SERPL-SCNC: 138 MMOL/L (ref 136–145)

## 2022-12-26 PROCEDURE — 36415 COLL VENOUS BLD VENIPUNCTURE: CPT | Performed by: NURSE PRACTITIONER

## 2022-12-26 PROCEDURE — 85014 HEMATOCRIT: CPT | Performed by: NURSE PRACTITIONER

## 2022-12-26 PROCEDURE — 99214 OFFICE O/P EST MOD 30 MIN: CPT | Performed by: NURSE PRACTITIONER

## 2022-12-26 PROCEDURE — 80048 BASIC METABOLIC PNL TOTAL CA: CPT | Performed by: NURSE PRACTITIONER

## 2022-12-26 PROCEDURE — 85018 HEMOGLOBIN: CPT | Performed by: NURSE PRACTITIONER

## 2022-12-26 RX ORDER — AMLODIPINE BESYLATE 2.5 MG/1
2.5 TABLET ORAL DAILY
Qty: 90 TABLET | Refills: 1 | Status: SHIPPED | OUTPATIENT
Start: 2022-12-26 | End: 2023-06-26

## 2022-12-26 ASSESSMENT — ASTHMA QUESTIONNAIRES
ACT_TOTALSCORE: 25
QUESTION_2 LAST FOUR WEEKS HOW OFTEN HAVE YOU HAD SHORTNESS OF BREATH: NOT AT ALL
QUESTION_1 LAST FOUR WEEKS HOW MUCH OF THE TIME DID YOUR ASTHMA KEEP YOU FROM GETTING AS MUCH DONE AT WORK, SCHOOL OR AT HOME: NONE OF THE TIME
QUESTION_3 LAST FOUR WEEKS HOW OFTEN DID YOUR ASTHMA SYMPTOMS (WHEEZING, COUGHING, SHORTNESS OF BREATH, CHEST TIGHTNESS OR PAIN) WAKE YOU UP AT NIGHT OR EARLIER THAN USUAL IN THE MORNING: NOT AT ALL
QUESTION_4 LAST FOUR WEEKS HOW OFTEN HAVE YOU USED YOUR RESCUE INHALER OR NEBULIZER MEDICATION (SUCH AS ALBUTEROL): NOT AT ALL
ACT_TOTALSCORE: 25
QUESTION_5 LAST FOUR WEEKS HOW WOULD YOU RATE YOUR ASTHMA CONTROL: COMPLETELY CONTROLLED

## 2022-12-26 NOTE — PROGRESS NOTES
"  Assessment & Plan     Essential hypertension  Medication: continue current medication regimen unchanged   Dietary sodium restriction   Regular aerobic exercise   Recheck in 6 months, sooner should new symptoms or    problems arise.   Labs drawn today Potassium and Creat  - Basic metabolic panel  - Hemoglobin and hematocrit (pending preop visit will check today)  - amLODIPine (NORVASC) 2.5 MG tablet  Dispense: 90 tablet; Refill: 1       BMI:   Estimated body mass index is 26.67 kg/m  as calculated from the following:    Height as of this encounter: 1.74 m (5' 8.5\").    Weight as of this encounter: 80.7 kg (178 lb).       FUTURE APPOINTMENTS:       - Follow-up visit as scheduled    No follow-ups on file.    SYDNEY Manriquez [facility]    Ann Hayes is a 47 year old, presenting for the following health issues:  Hypertension (BP Check ) and Abnormal Bleeding Problem (Bleeding on and off from Birth Control (was a change in medication))    - BP medication was started last spring started Norvac 2.5 mg daily for about 1-2 months; patient self-discontinued due to improved work stress; seen at OB visits with elevated readings 144/93; PCP restarted amlodipine 2.5mg; reports tolerating well denies CP, SOB, edema; no home readings.  - pending preop appt 12/28     History of Present Illness       Reason for visit:  Prepre for a procedure    She eats 4 or more servings of fruits and vegetables daily.She consumes 0 sweetened beverage(s) daily.She exercises with enough effort to increase her heart rate 30 to 60 minutes per day.  She exercises with enough effort to increase her heart rate 5 days per week.   She is taking medications regularly.             Review of Systems   Constitutional, HEENT, cardiovascular, pulmonary, gi and gu systems are negative, except as otherwise noted.      Objective    /79 (BP Location: Right arm, Patient Position: Sitting, Cuff Size: Adult Regular) " "  Pulse 79   Temp 97.2  F (36.2  C) (Temporal)   Ht 1.74 m (5' 8.5\")   Wt 80.7 kg (178 lb)   LMP  (LMP Unknown)   SpO2 97%   BMI 26.67 kg/m    Body mass index is 26.67 kg/m .  Physical Exam   GENERAL: healthy, alert and no distress  RESP: lungs clear to auscultation - no rales, rhonchi or wheezes  CV: regular rate and rhythm, normal S1 S2, no S3 or S4, no murmur, click or rub, no peripheral edema and peripheral pulses strong                    "

## 2022-12-28 ENCOUNTER — MYC MEDICAL ADVICE (OUTPATIENT)
Dept: FAMILY MEDICINE | Facility: CLINIC | Age: 47
End: 2022-12-28

## 2023-01-03 NOTE — TELEPHONE ENCOUNTER
Appointment was rescheduled after I rescheduled appt my self, patient was rescheduled with Jeremy instead at 1:30

## 2023-01-06 NOTE — PROGRESS NOTES
DISCHARGE SUMMARY    Abigail Harvey was seen 3 times for evaluation and treatment.  Patient did not return for further treatment and current status is unknown.  Due to short treatment duration, no objective or functional changes were made.  Please see goal flow sheet from episode noted date below and initial evaluation for further information.  Patient is discharged from therapy and therapy episode is resolved as of 01/06/23.      Linked Episodes   Type: Episode: Status: Noted: Resolved: Last update: Updated by:   PHYSICAL THERAPY L ankle sprain [id number] Active 9/29/2022  11/10/2022  3:58 PM Kristin Castillo, PT      Comments:

## 2023-01-20 DIAGNOSIS — Z01.818 PRE-OP EXAM: Primary | ICD-10-CM

## 2023-01-20 DIAGNOSIS — N93.9 ABNORMAL UTERINE BLEEDING (AUB): ICD-10-CM

## 2023-01-23 ENCOUNTER — OFFICE VISIT (OUTPATIENT)
Dept: FAMILY MEDICINE | Facility: CLINIC | Age: 48
End: 2023-01-23
Payer: COMMERCIAL

## 2023-01-23 VITALS
BODY MASS INDEX: 27.13 KG/M2 | HEIGHT: 68 IN | HEART RATE: 74 BPM | RESPIRATION RATE: 16 BRPM | DIASTOLIC BLOOD PRESSURE: 68 MMHG | TEMPERATURE: 97.1 F | SYSTOLIC BLOOD PRESSURE: 123 MMHG | OXYGEN SATURATION: 99 % | WEIGHT: 179 LBS

## 2023-01-23 DIAGNOSIS — Z01.818 PREOP GENERAL PHYSICAL EXAM: Primary | ICD-10-CM

## 2023-01-23 DIAGNOSIS — D50.0 IRON DEFICIENCY ANEMIA DUE TO CHRONIC BLOOD LOSS: ICD-10-CM

## 2023-01-23 DIAGNOSIS — N92.0 MENORRHAGIA WITH REGULAR CYCLE: ICD-10-CM

## 2023-01-23 DIAGNOSIS — N84.0 UTERINE POLYP: ICD-10-CM

## 2023-01-23 PROCEDURE — 99213 OFFICE O/P EST LOW 20 MIN: CPT | Performed by: STUDENT IN AN ORGANIZED HEALTH CARE EDUCATION/TRAINING PROGRAM

## 2023-01-23 ASSESSMENT — PAIN SCALES - GENERAL: PAINLEVEL: NO PAIN (0)

## 2023-01-23 NOTE — PATIENT INSTRUCTIONS
Ok to continue all medications normally.    Preparing for Your Surgery  Getting started  A nurse will call you to review your health history and instructions. They will give you an arrival time based on your scheduled surgery time. Please be ready to share:  Your doctor's clinic name and phone number  Your medical, surgical, and anesthesia history  A list of allergies and sensitivities  A list of medicines, including herbal treatments and over-the-counter drugs  Whether the patient has a legal guardian (ask how to send us the papers in advance)  Please tell us if you're pregnant--or if there's any chance you might be pregnant. Some surgeries may injure a fetus (unborn baby), so they require a pregnancy test. Surgeries that are safe for a fetus don't always need a test, and you can choose whether to have one.   If you have a child who's having surgery, please ask for a copy of Preparing for Your Child's Surgery.    Preparing for surgery  Within 10 to 30 days of surgery: Have a pre-op exam (sometimes called an H&P, or History and Physical). This can be done at a clinic or pre-operative center.  If you're having a , you may not need this exam. Talk to your care team.  At your pre-op exam, talk to your care team about all medicines you take. If you need to stop any medicines before surgery, ask when to start taking them again.  We do this for your safety. Many medicines can make you bleed too much during surgery. Some change how well surgery (anesthesia) drugs work.  Call your insurance company to let them know you're having surgery. (If you don't have insurance, call [phone].)  Call your clinic if there's any change in your health. This includes signs of a cold or flu (sore throat, runny nose, cough, rash, fever). It also includes a scrape or scratch near the surgery site.  If you have questions on the day of surgery, call your hospital or surgery center.  Eating and drinking guidelines  For your safety:  Unless your surgeon tells you otherwise, follow the guidelines below.  Eat and drink as usual until 8 hours before you arrive for surgery. After that, no food or milk.  Drink clear liquids until 2 hours before you arrive. These are liquids you can see through, like water, Gatorade, and Propel Water. They also include plain black coffee and tea (no cream or milk), candy, and breath mints. You can spit out gum when you arrive.  If you drink alcohol: Stop drinking it the night before surgery.  If your care team tells you to take medicine on the morning of surgery, it's okay to take it with a sip of water.  Preventing infection  Shower or bathe the night before and morning of your surgery. Follow the instructions your clinic gave you. (If no instructions, use regular soap.)  Don't shave or clip hair near your surgery site. We'll remove the hair if needed.  Don't smoke or vape the morning of surgery. You may chew nicotine gum up to 2 hours before surgery. A nicotine patch is okay.  Note: Some surgeries require you to completely quit smoking and nicotine. Check with your surgeon.  Your care team will make every effort to keep you safe from infection. We will:  Clean our hands often with soap and water (or an alcohol-based hand rub).  Clean the skin at your surgery site with a special soap that kills germs.  Give you a special gown to keep you warm. (Cold raises the risk of infection.)  Wear special hair covers, masks, gowns and gloves during surgery.  Give antibiotic medicine, if prescribed. Not all surgeries need antibiotics.  What to bring on the day of surgery  Photo ID and insurance card  Copy of your health care directive, if you have one  Glasses and hearing aids (bring cases)  You can't wear contacts during surgery  Inhaler and eye drops, if you use them (tell us about these when you arrive)  CPAP machine or breathing device, if you use them  A few personal items, if spending the night  If you have . . .  A  pacemaker, ICD (cardiac defibrillator) or other implant: Bring the ID card.  An implanted stimulator: Bring the remote control.  A legal guardian: Bring a copy of the certified (court-stamped) guardianship papers.  Please remove any jewelry, including body piercings. Leave jewelry and other valuables at home.  If you're going home the day of surgery  You must have a responsible adult drive you home. They should stay with you overnight as well.  If you don't have someone to stay with you, and you aren't safe to go home alone, we may keep you overnight. Insurance often won't pay for this.  After surgery  If it's hard to control your pain or you need more pain medicine, please call your surgeon's office.  Questions?   If you have any questions for your care team, list them here: _________________________________________________________________________________________________________________________________________________________________________ ____________________________________ ____________________________________ ____________________________________

## 2023-01-23 NOTE — PROGRESS NOTES
[facility] [address] [place] [address]  Phone: [phone]  Primary Provider: Beryl Patel  Pre-op Performing Provider: RULA PAZ    PREOPERATIVE EVALUATION:  Today's date: 1/23/2023    Abigail Harvey is a 47 year old female who presents for a preoperative evaluation.    Surgical Information:  Surgery/Procedure: HYSTEROSCOPY, WITH DILATION AND CURETTAGE OF UTERUS USING MORCELLATOR  Surgery Location: [facility]  Surgeon: Nyasia Jacques MD  Surgery Date: 01/27/2023  Time of Surgery: 2:15PM  Where patient plans to recover: At home with family  Fax number for surgical facility: [place]    Type of Anesthesia Anticipated: General and to be determined    Assessment & Plan     The proposed surgical procedure is considered LOW risk.    Preop general physical exam  Menorrhagia with regular cycle  Iron deficiency anemia due to chronic blood loss  Uterine polyp  Hysteroscopy w/D&C and uterine polyp removal. Has heavy periods, likely due to uterine polyp. New iron def anemia related to heavy periods/chronic blood loss. Recent labs on 12/26/22 stable-hgb 10.7, BMP wnl. Will defer repeat blood work today. No EKG indicated. Chronic conditions optimized-BP normal. Ok to continue all medications.       RECOMMENDATION:  APPROVAL GIVEN to proceed with proposed procedure, without further diagnostic evaluation.    Subjective     HPI related to upcoming procedure:     Heavy periods  -intrauterine polyp  -heavy periods     Preop Questions 1/23/2023   1. Have you ever had a heart attack or stroke? No   2. Have you ever had surgery on your heart or blood vessels, such as a stent placement, a coronary artery bypass, or surgery on an artery in your head, neck, heart, or legs? No   3. Do you have chest pain with activity? No   4. Do you have a history of  heart failure? No   5. Do you currently have a cold, bronchitis or symptoms of other  infection? No   6. Do you have a cough, shortness of breath, or wheezing? No   7. Do you or anyone in your family have previous history of blood clots? UNKNOWN in family, no personal clots   8. Do you or does anyone in your family have a serious bleeding problem such as prolonged bleeding following surgeries or cuts? No   9. Have you ever had problems with anemia or been told to take iron pills? YES currently   10. Have you had any abnormal blood loss such as black, tarry or bloody stools, or abnormal vaginal bleeding? No   11. Have you ever had a blood transfusion? No   12. Are you willing to have a blood transfusion if it is medically needed before, during, or after your surgery? Yes   13. Have you or any of your relatives ever had problems with anesthesia? No   14. Do you have sleep apnea, excessive snoring or daytime drowsiness? No   15. Do you have any artifical heart valves or other implanted medical devices like a pacemaker, defibrillator, or continuous glucose monitor? No   16. Do you have artificial joints? No   17. Are you allergic to latex? No   18. Is there any chance that you may be pregnant? No       Health Care Directive:  Patient does not have a Health Care Directive or Living Will: Discussed advance care planning with patient; however, patient declined at this time.    Preoperative Review of :   reviewed - no record of controlled substances prescribed.      Review of Systems  CONSTITUTIONAL: NEGATIVE for fever, chills, change in weight  INTEGUMENTARY/SKIN: NEGATIVE for worrisome rashes, moles or lesions  EYES: NEGATIVE for vision changes or irritation  ENT/MOUTH: NEGATIVE for ear, mouth and throat problems  RESP: NEGATIVE for significant cough or SOB  CV: NEGATIVE for chest pain, palpitations or peripheral edema  GI: NEGATIVE for nausea, abdominal pain, heartburn, or change in bowel habits  : NEGATIVE for frequency, dysuria, or hematuria  MUSCULOSKELETAL: NEGATIVE for significant arthralgias  or myalgia  NEURO: NEGATIVE for weakness, dizziness or paresthesias  ENDOCRINE: NEGATIVE for temperature intolerance, skin/hair changes  HEME: NEGATIVE for bleeding problems  PSYCHIATRIC: NEGATIVE for changes in mood or affect    Patient Active Problem List    Diagnosis Date Noted     Sprain of left ankle, unspecified ligament, initial encounter 2022     Priority: Medium     Pain in joint involving ankle and foot, left 2022     Priority: Medium     Serrated adenoma of colon 2022     Priority: Medium     Hx of cold sores 2022     Priority: Medium     Adjustment disorder with mixed anxiety and depressed mood 11/10/2020     Priority: Medium     Mild persistent asthma without complication 2018     Priority: Medium     BMI 27.0-27.9,adult 2018     Priority: Medium     Excessive or frequent menstruation 2015     Priority: Medium     Chondromalacia of patella 2012     Priority: Medium      Past Medical History:   Diagnosis Date     Adjustment disorder with mixed anxiety and depressed mood 11/10/2020     Injury of left thumb, initial encounter 2017     Irritability 11/10/2020     Lyme disease 2018     Thumb pain, left 3/23/2017     Past Surgical History:   Procedure Laterality Date      SECTION      x2     COLONOSCOPY N/A 2022    Procedure: COLONOSCOPY, WITH POLYPECTOMY;  Surgeon: Marivel Benz MD;  [place] [facility] [place]      TOOTH EXTRACTION W/FORCEP       Current Outpatient Medications   Medication Sig Dispense Refill     ADVAIR -21 MCG/ACT inhaler Inhale 2 puffs into the lungs 2 times daily       albuterol (PROAIR HFA/PROVENTIL HFA/VENTOLIN HFA) 108 (90 Base) MCG/ACT inhaler Inhale 2 puffs into the lungs every 6 hours as needed for shortness of breath / dyspnea or wheezing 1 Inhaler 3     amLODIPine (NORVASC) 2.5 MG tablet Take 1 tablet (2.5 mg) by mouth daily 90 tablet 1     BINAXNOW COVID-19 AG HOME TEST KIT TEST AS DIRECTED TODAY        "norethindrone (MICRONOR) 0.35 MG tablet Take 1 tablet (0.35 mg) by mouth daily 90 tablet 3       No Known Allergies     Social History     Tobacco Use     Smoking status: Never     Smokeless tobacco: Never   Substance Use Topics     Alcohol use: Yes     Comment: occasional     History   Drug Use No         Objective     /68 (BP Location: Right arm, Patient Position: Sitting, Cuff Size: Adult Regular)   Pulse 74   Temp 97.1  F (36.2  C) (Tympanic)   Resp 16   Ht 1.727 m (5' 8\")   Wt 81.2 kg (179 lb)   LMP  (LMP Unknown)   SpO2 99%   BMI 27.22 kg/m      Physical Exam    GENERAL APPEARANCE: healthy, alert and no distress     EYES: EOMI, PERRL     HENT: ear canals and TM's normal and nose and mouth without ulcers or lesions     NECK: no adenopathy, no asymmetry, masses, or scars and thyroid normal to palpation     RESP: lungs clear to auscultation - no rales, rhonchi or wheezes     CV: regular rates and rhythm, normal S1 S2, no S3 or S4 and no murmur, click or rub     ABDOMEN:  soft, nontender, no HSM or masses and bowel sounds normal     MS: extremities normal- no gross deformities noted, no evidence of inflammation in joints, FROM in all extremities.     SKIN: no suspicious lesions or rashes     NEURO: Normal strength and tone, sensory exam grossly normal, mentation intact and speech normal     PSYCH: mentation appears normal. and affect normal/bright     LYMPHATICS: No cervical adenopathy    Recent Labs   Lab Test 12/26/22  0937 05/02/22  1609   HGB 10.7* 14.0   PLT  --  251    137   POTASSIUM 4.5 4.1   CR 0.71 0.68   A1C  --  5.0        Diagnostics:  No labs were ordered during this visit.  Recent Results (from the past 720 hour(s))   Basic metabolic panel    Collection Time: 12/26/22  9:37 AM   Result Value Ref Range    Sodium 138 136 - 145 mmol/L    Potassium 4.5 3.4 - 5.3 mmol/L    Chloride 102 98 - 107 mmol/L    Carbon Dioxide (CO2) 25 22 - 29 mmol/L    Anion Gap 11 7 - 15 mmol/L    Urea " Nitrogen 8.0 6.0 - 20.0 mg/dL    Creatinine 0.71 0.51 - 0.95 mg/dL    Calcium 8.9 8.6 - 10.0 mg/dL    Glucose 95 70 - 99 mg/dL    GFR Estimate >90 >60 mL/min/1.73m2   Hemoglobin and hematocrit    Collection Time: 12/26/22  9:37 AM   Result Value Ref Range    Hemoglobin 10.7 (L) 11.7 - 15.7 g/dL    Hematocrit 31.9 (L) 35.0 - 47.0 %      No EKG required, no history of coronary heart disease, significant arrhythmia, peripheral arterial disease or other structural heart disease.    Revised Cardiac Risk Index (RCRI):  The patient has the following serious cardiovascular risks for perioperative complications:   - No serious cardiac risks = 0 points     RCRI Interpretation: 0 points: Class I (very low risk - 0.4% complication rate)       Signed Electronically by: Albert Luna DO  Copy of this evaluation report is provided to requesting physician.

## 2023-01-24 LAB
ABO/RH(D): NORMAL
ANTIBODY SCREEN: NEGATIVE
SPECIMEN EXPIRATION DATE: NORMAL

## 2023-01-25 ENCOUNTER — LAB (OUTPATIENT)
Dept: LAB | Facility: CLINIC | Age: 48
End: 2023-01-25
Payer: COMMERCIAL

## 2023-01-25 DIAGNOSIS — N93.9 ABNORMAL UTERINE BLEEDING (AUB): ICD-10-CM

## 2023-01-25 DIAGNOSIS — Z01.818 PRE-OP EXAM: ICD-10-CM

## 2023-01-25 LAB
ERYTHROCYTE [DISTWIDTH] IN BLOOD BY AUTOMATED COUNT: 12.9 % (ref 10–15)
HCT VFR BLD AUTO: 30.6 % (ref 35–47)
HGB BLD-MCNC: 9.8 G/DL (ref 11.7–15.7)
MCH RBC QN AUTO: 26 PG (ref 26.5–33)
MCHC RBC AUTO-ENTMCNC: 32 G/DL (ref 31.5–36.5)
MCV RBC AUTO: 81 FL (ref 78–100)
PLATELET # BLD AUTO: 263 10E3/UL (ref 150–450)
RBC # BLD AUTO: 3.77 10E6/UL (ref 3.8–5.2)
WBC # BLD AUTO: 6.3 10E3/UL (ref 4–11)

## 2023-01-25 PROCEDURE — 36415 COLL VENOUS BLD VENIPUNCTURE: CPT

## 2023-01-25 PROCEDURE — 86850 RBC ANTIBODY SCREEN: CPT

## 2023-01-25 PROCEDURE — 86900 BLOOD TYPING SEROLOGIC ABO: CPT

## 2023-01-25 PROCEDURE — U0003 INFECTIOUS AGENT DETECTION BY NUCLEIC ACID (DNA OR RNA); SEVERE ACUTE RESPIRATORY SYNDROME CORONAVIRUS 2 (SARS-COV-2) (CORONAVIRUS DISEASE [COVID-19]), AMPLIFIED PROBE TECHNIQUE, MAKING USE OF HIGH THROUGHPUT TECHNOLOGIES AS DESCRIBED BY CMS-2020-01-R: HCPCS

## 2023-01-25 PROCEDURE — 85027 COMPLETE CBC AUTOMATED: CPT

## 2023-01-25 PROCEDURE — 86901 BLOOD TYPING SEROLOGIC RH(D): CPT

## 2023-01-25 PROCEDURE — U0005 INFEC AGEN DETEC AMPLI PROBE: HCPCS

## 2023-01-26 LAB — SARS-COV-2 RNA RESP QL NAA+PROBE: POSITIVE

## 2023-01-26 NOTE — TELEPHONE ENCOUNTER
LVMTCB to discuss rescheduling to 02/24/23 at 4PM with .     Patient COVID+.       Slyvia  She/her/hers  [facility] Surgery Scheduler

## 2023-01-27 ENCOUNTER — MYC MEDICAL ADVICE (OUTPATIENT)
Dept: FAMILY MEDICINE | Facility: CLINIC | Age: 48
End: 2023-01-27

## 2023-01-27 ENCOUNTER — VIRTUAL VISIT (OUTPATIENT)
Dept: FAMILY MEDICINE | Facility: CLINIC | Age: 48
End: 2023-01-27
Payer: COMMERCIAL

## 2023-01-27 DIAGNOSIS — U07.1 CLINICAL DIAGNOSIS OF COVID-19: ICD-10-CM

## 2023-01-27 DIAGNOSIS — R52 BODY ACHES: ICD-10-CM

## 2023-01-27 DIAGNOSIS — U07.1 INFECTION DUE TO 2019 NOVEL CORONAVIRUS: Primary | ICD-10-CM

## 2023-01-27 DIAGNOSIS — I10 BENIGN ESSENTIAL HYPERTENSION: ICD-10-CM

## 2023-01-27 DIAGNOSIS — J45.30 MILD PERSISTENT ASTHMA WITHOUT COMPLICATION: ICD-10-CM

## 2023-01-27 DIAGNOSIS — J02.9 SORE THROAT: ICD-10-CM

## 2023-01-27 DIAGNOSIS — M79.10 MYALGIA: ICD-10-CM

## 2023-01-27 DIAGNOSIS — R05.1 ACUTE COUGH: ICD-10-CM

## 2023-01-27 DIAGNOSIS — R51.9 NONINTRACTABLE EPISODIC HEADACHE, UNSPECIFIED HEADACHE TYPE: ICD-10-CM

## 2023-01-27 PROBLEM — M25.572 PAIN IN JOINT INVOLVING ANKLE AND FOOT, LEFT: Status: RESOLVED | Noted: 2022-09-30 | Resolved: 2023-01-27

## 2023-01-27 PROBLEM — S93.402A SPRAIN OF LEFT ANKLE, UNSPECIFIED LIGAMENT, INITIAL ENCOUNTER: Status: RESOLVED | Noted: 2022-09-30 | Resolved: 2023-01-27

## 2023-01-27 PROCEDURE — 99214 OFFICE O/P EST MOD 30 MIN: CPT | Mod: CS | Performed by: FAMILY MEDICINE

## 2023-01-27 NOTE — PROGRESS NOTES
Abigail is a 47 year old who is being evaluated via a billable telephone visit.      What phone number would you like to be contacted at? [phone]  How would you like to obtain your AVS? Carolee  Distant Location (provider location):  On-site    Assessment & Plan     Infection due to 2019 novel coronavirus  Clinical diagnosis of COVID-19  Acute cough  Non intractable episodic headache, unspecified headache type  Sore throat  Body aches/ Myalgia  Positive for Covid  Meets criteria for therapy   No contraindications  Normal kidney function   Sent in paxlovid for 5 days sent to [facility]  Explained can cause altered taste, diarrhea, body aches liver inflammation  Hold the Advair while on paxlovid  Use albuterol as needed  Opted not to get a alternative inhaler like beclomethasone for now given possible costs  Hold amlodipine and monitor BP off med, if BP > 130/80, take 1/2 the dose of the 2.5 mg tablet while on the paxlovid  Symptomatic care for cough, body aches  Stay well hydrated  If gets worse like chest pain , shortness of breath etc, go to the ER  You could also monitor your SATs at home and seek higher level of care if SATs below 93 % on room air  In a small percentage of people there is a possibility of Covid relapse on day 6 to 8 after starting paxlovid  Monitor for recurrent new symptoms after getting better or a home rapid testing turning positive after turning negative,  ( A PCR will be positive for a long time so abner not recheck a PCR test)   - nirmatrelvir and ritonavir (PAXLOVID) therapy pack; Take 3 tablets by mouth 2 times daily for 5 days (Take 2 Nirmatrelvir tablets and 1 Ritonavir tablet twice daily for 5 days)  Quarantine 5 days at home then can go out with well fitted mask for total ten days. If should have a relapse the count down for quarantine starts again, paxlovid usually not re-given for relapse     Benign essential hypertension  HTN increases risk of severe disease, would  "benefit from paxlovid  Hold amlodipine and monitor BP off med, if BP > 130/80, take 1/2 the dose of the 2.5 mg tablet while on the paxlovid    Mild persistent asthma without complication  Asthma increases risk of sever illness from Covid making paxlovid beneficial to try  On Advair and paxlovid can increase effect of salmeterol increasing risk of QT prolongation and cardiac toxicity   Advised to Hold the Advair while on paxlovid  Use albuterol as needed  Opted not to get a alternative inhaler like beclomethasone for now given possible costs    Review of the result(s) of each unique test - diagnostics past 1 yr  Diagnosis or treatment significantly limited by social determinants of health - phone visit ,  Ordering of each unique test  Prescription drug management  27 minutes spent on the date of the encounter doing chart review, history and exam, documentation and further activities per the note     BMI:   Estimated body mass index is 27.22 kg/m  as calculated from the following:    Height as of 1/23/23: 1.727 m (5' 8\").    Weight as of 1/23/23: 81.2 kg (179 lb).       See Patient Instructions    No follow-ups on file.    Beryl Patel MD  [facility]    Ann Hayes is a 47 year old, presenting for the following health issues:  Covid Concern (COVID Treatment )      History of Present Illness       Reason for visit:  Paxlovid  Symptom onset:  Today  Symptoms include:  Sore throat, congestion  Symptom intensity:  Moderate  Symptom progression:  Staying the same  Had these symptoms before:  Yes  Has tried/received treatment for these symptoms:  NoShe consumes 0 sweetened beverage(s) daily. She exercises with enough effort to increase her heart rate 5 days per week.   She is taking medications regularly.     COVID-19 Symptom Review  How many days ago did these symptoms start? 1-26-23    Are any of the following symptoms significant for you?    New or worsening difficulty breathing? " No    Worsening cough? Yes, it's a dry cough.     Fever or chills? No    Headache: YES    Sore throat: YES    Chest pain: No    Diarrhea: No    Body aches? YES    What treatments has patient tried? Acetaminophen   Does patient live in a nursing home, group home, or shelter? No  Does patient have a way to get food/medications during quarantined? Yes, I have a friend or family member who can help me.    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, mild persistent asthma on Advair hfa and albuterol prn, HTN on amlodipine 2.5 mg, serrated and tubular adenoma  Remove don scope in 6/2022, excessive menstruation on progesterone only birth control , hx of adjustment disorder with anxiety currently on no meds, patella chondromalacia, hx of resolved left ankle strain, remote lyme's, resolved thumb pain, prior C section, hx of cold sores,   Last seen for a physical in may when doing well, soon after that started on amlodipine for HTN, taken off estrogen due to elevated BP' and put on progesterone only birth control,   Seen in sept for left ankle sprain  seen by gyn 10/7/22 for AUB and advised pelvic u/S u/S in nov showed an endometrial polyp and set up for a hysteroscopy and D & C .noted lost weight and able to come off amlodipine then BP trend up again & seen 12/6/22 and resumed on amlodipine and labs done showed normal kidney function. Seen for preop 1/23/22 & cleared for surgery scheduled for the 27th. Had a preop op Covid test done 1/25/23    Covid PCR done on 1/25   Was called yest to say was positive  Surprised had Covid as felt well then over 1/26 night into 1/27 started having symptoms  Dry cough, HA, sore throat, body aches  tightness in chest like asthma  No fever or chills, no dizziness, no double or blurry vision, no facial pain, earache, some runny nose, post nasal drip, no trouble hearing, smelling, tasting or swallowing, no no chest pain, trouble breathing or palpitations, No abdominal pain, heart burn, reflux, nausea  or vomiting or diarrhea or constipation, no blood in stools or black stools, no weight loss or night sweats. No dysuria, hematuria, frequency, urgency, hesitancy, incontinence, No pelvic complaints. No leg swelling or joint pain. No rash.    HTN on amlodipine 2.5 mg daily restarted in dec     Asthma well controlled Using Advair, Not had to use rescue albuterol in over a yr    On Micronor    Review of Systems   Constitutional, HEENT, cardiovascular, pulmonary, GI, , musculoskeletal, neuro, skin, endocrine and psych systems are negative, except as otherwise noted.      Objective           Vitals:  No vitals were obtained today due to virtual visit.    Physical Exam   healthy, alert, no distress and cooperative  PSYCH: Alert and oriented times 3; coherent speech, normal   rate and volume, able to articulate logical thoughts, able   to abstract reason, no tangential thoughts, no hallucinations   or delusions  Her affect is normal  RESP: cough heard on phone, no audible wheezing, able to talk in full sentences  Remainder of exam unable to be completed due to telephone visits    Lab on 01/25/2023   Component Date Value Ref Range Status     WBC Count 01/25/2023 6.3  4.0 - 11.0 10e3/uL Final     RBC Count 01/25/2023 3.77 (L)  3.80 - 5.20 10e6/uL Final     Hemoglobin 01/25/2023 9.8 (L)  11.7 - 15.7 g/dL Final     Hematocrit 01/25/2023 30.6 (L)  35.0 - 47.0 % Final     MCV 01/25/2023 81  78 - 100 fL Final     MCH 01/25/2023 26.0 (L)  26.5 - 33.0 pg Final     MCHC 01/25/2023 32.0  31.5 - 36.5 g/dL Final     RDW 01/25/2023 12.9  10.0 - 15.0 % Final     Platelet Count 01/25/2023 263  150 - 450 10e3/uL Final     SARS CoV2 PCR 01/25/2023 Positive (A)  Negative Final    POSITIVE: SARS-CoV-2 (COVID-19) RNA detected, presumed positive.     ABO/RH(D) 01/25/2023 O POS   Final     Antibody Screen 01/25/2023 Negative  Negative Final     SPECIMEN EXPIRATION DATE 01/25/2023 [id number]   Final     No results found for any visits on  01/27/23.  No results found for this or any previous visit (from the past 24 hour(s)).        Phone call duration: 17 minutes

## 2023-01-27 NOTE — PATIENT INSTRUCTIONS
Positive for covid  Meets criteria for therapy   No contraindications  Normal kidney function   Sent in paxlovid for 5 days sent to [facility]  Can cause altered taste, diarrhea, body aches liver inflammation  Hold the Advair while on paxlovid  Use albuterol as needed  Opted not to get a alternative inhaler like beclomethasone for now given possible costs  Hold amlodipine and monitor BP off med, if BP > 130/80, take 1/2 the dose of the 2.5 mg tablet while on the paxlovid  Symptomatic care for cough, bodyaches  Stay well hydrated  If gets worse like chest pain , shortness of breath etc, go to the ER  You could also monitor your sats at home and seek higher level of care if sats below 93 % on room air  In a small percentage of people there is a possibility of covid relapse on day 6 to 8 after starting paxlovid  Monitor for recurrent new symptoms after getting better or a home rapid testing turning positive after turning negative,  ( A pcr will be positive for a long time so abner not recheck a pcr test)   Quarantine 5 days at home then can go out with well fitted mask for total ten days. If should have a relapse the count down for quarantine starts again, paxlovid usually not re-given for relapse         COVID-19 Outpatient Treatments  Your care team can help you find the best treatments for COVID-19. Talk to a health care provider or refer to the FDA medicine fact sheets below.  Important: You can't have Paxlovid or molnupiravir if you're starting the medicine more than 5 days after your symptoms have started.  Paxlovid: https://www.fda.gov/media/761372/download  Molnupiravir (Lagevrio): https://www.fda.gov/media/352004/download  Paxlovid (nimatrelvir and ritonavir)  How it works  Two medicines (nirmatrelvir and ritonavir) are taken together. They stop the virus from growing. Less amount of virus is easier for your body to fight.  Benefits  Lowers risk of a hospital stay or death from COVID-19.  How to  take  Medicine comes in a daily container with both medicine tablets. Take by mouth twice daily (once in the morning, once at night) for 5 days.  The number of tablets to take varies by patient.  Don't chew or break capsules. Swallow whole.  When to take  Take as soon as possible after positive COVID-19 test result, and within 5 days of your first symptoms.  Who can take it  Patients must be 12 years or older, weigh at least 88 pounds, and have tested positive for COVID-19. Paxlovid is the preferred treatment for pregnant patients.  Possible side effects  Can cause altered sense of taste, diarrhea (loose, watery stools), high blood pressure, muscle aches.  Medicine conflicts  Some medicines may conflict with Paxlovid and may cause serious side effects.  Tell your care team about all the medicines you take, including prescription and over-the-counter medicines, vitamins, and herbal supplements.  Your care team will review your medicines to make sure that you can safely take Paxlovid.  Cautions  Paxlovid is not advised for patients with severe kidney or liver disease. If you have kidney or liver problems, the dose may need to be changed.  If you're pregnant or breastfeeding, talk to your care team about your options.  If you take hormonal birth control (such as the Pill), then you or your partner should also use a non-hormonal form of birth control (such as a condom). Keep doing this for 1 menstrual cycle after your last dose of Paxlovid.  Molnupiravir (Lagevrio)  How it works  Stops the virus from growing. Less amount of virus is easier for your body to fight.  Benefits  Lowers risk of a hospital stay or death from COVID-19.  How to take  Take 4 capsules by mouth every 12 hours (4 in the morning and 4 at night) for 5 days. Don't chew or break capsules. Swallow whole.  When to take  Take as soon as possible after positive COVID-19 test result, and within 5 days of your first symptoms.  Who can take it  Patients must  be 18 years or older and have tested positive for COVID-19.  Possible side effects  Diarrhea (loose, watery stools), nausea (feeling sick to your stomach), dizziness, headaches.  Medicine conflicts  Right now, there are no known conflicts with other drugs. But tell your care team about all medicines you take.  Cautions  This medicine is not advised for patients who are pregnant.  If you are someone who could become pregnant, use trusted birth control until 4 days after your last dose of molnupiravir.  If your partner could become pregnant, you should use trusted birth control until 3 months after your last dose of molnupiravir.  For informational purposes only. Not to replace the advice of your health care provider. Copyright   2022 [facility]. All rights reserved. Clinically reviewed by Frances Jackson, PharmD, BCACP. [employer] 376659 - REV 12/22.    Instructions for Patients      What are the symptoms of COVID-19?  Symptoms can include fever, cough, shortness of breath, chills, headache, muscle pain sore throat, fatigue, runny or stuffy nose, and loss of taste and smell. Other less common symptoms include nausea, vomiting, or diarrhea (watery stools).    Know when to call 911. Emergency warning signs include:  Trouble breathing or shortness of breath  Pain or pressure in the chest that doesn't go away  Feeling confused like you haven't felt before, or not being able to wake up  Bluish-colored lips or face    How can I take care of myself?  Get lots of rest. Drink extra fluids (unless a doctor has told you not to).  Take Tylenol (acetaminophen) for fever or pain. If you have liver or kidney problems, ask your family doctor if it's okay to take Tylenol   Adults:   650 mg (two 325 mg pills or tablets) every 4 to 6 hours, or...   1,000 mg (two 500 mg pills or tablets) every 8 hours as needed.  Note: Don't take more than 3,000 mg in one day. Acetaminophen is found in many medicines (both prescribed and  over the counter). Read all labels to be sure you don't take too much.  For children, check the Tylenol bottle for the right dose. The dose is based on the child's age or weight.  Take over the counter medicines for your symptoms as needed. Talk to your pharmacist.  If you have other health problems (like cancer, heart failure, an organ transplant, or severe kidney disease): Call your specialty clinic if you don't feel better in the next 2 days.    Where can I get more information?   [facility] [facility] COVID-19 Resource Hub: www.[facility].org/covid19/   CDC Quarantine & Isolation: https://www.cdc.gov/coronavirus/2019-ncov/your-health/quarantine-isolation.html   CDC - What to Do If You're Sick: https://www.cdc.gov/coronavirus/2019-ncov/if-you-are-sick/index.html

## 2023-02-01 NOTE — TELEPHONE ENCOUNTER
Type of surgery: gyn  Location of surgery: [facility] OR  Date and time of surgery: 02/24/23 4PM  Surgeon: BONIFACIO Jacques  Pre-Op Appt Date: surgeon on DOS  Post-Op Appt Date: patient will wait to schedule   Packet sent out: has from last time  Pre-cert/Authorization completed:  Not Applicable  Date: 02/01/23       MELONIE Ward  She/her/hers  [facility] Scheduler

## 2023-02-09 ENCOUNTER — MYC MEDICAL ADVICE (OUTPATIENT)
Dept: FAMILY MEDICINE | Facility: CLINIC | Age: 48
End: 2023-02-09
Payer: COMMERCIAL

## 2023-02-17 DIAGNOSIS — Z01.818 PRE-OP EXAM: Primary | ICD-10-CM

## 2023-02-17 DIAGNOSIS — N93.9 ABNORMAL UTERINE BLEEDING (AUB): ICD-10-CM

## 2023-02-20 LAB
ABO/RH(D): NORMAL
ANTIBODY SCREEN: NEGATIVE
SPECIMEN EXPIRATION DATE: NORMAL

## 2023-02-21 ENCOUNTER — LAB (OUTPATIENT)
Dept: LAB | Facility: CLINIC | Age: 48
End: 2023-02-21
Payer: COMMERCIAL

## 2023-02-21 DIAGNOSIS — Z01.818 PRE-OP EXAM: ICD-10-CM

## 2023-02-21 DIAGNOSIS — N93.9 ABNORMAL UTERINE BLEEDING (AUB): ICD-10-CM

## 2023-02-21 LAB
ERYTHROCYTE [DISTWIDTH] IN BLOOD BY AUTOMATED COUNT: 16.9 % (ref 10–15)
HCT VFR BLD AUTO: 35.4 % (ref 35–47)
HGB BLD-MCNC: 11.7 G/DL (ref 11.7–15.7)
MCH RBC QN AUTO: 25.9 PG (ref 26.5–33)
MCHC RBC AUTO-ENTMCNC: 33.1 G/DL (ref 31.5–36.5)
MCV RBC AUTO: 79 FL (ref 78–100)
PLATELET # BLD AUTO: 293 10E3/UL (ref 150–450)
RBC # BLD AUTO: 4.51 10E6/UL (ref 3.8–5.2)
WBC # BLD AUTO: 8.1 10E3/UL (ref 4–11)

## 2023-02-21 PROCEDURE — 86850 RBC ANTIBODY SCREEN: CPT

## 2023-02-21 PROCEDURE — 85027 COMPLETE CBC AUTOMATED: CPT

## 2023-02-21 PROCEDURE — 86900 BLOOD TYPING SEROLOGIC ABO: CPT

## 2023-02-21 PROCEDURE — 86901 BLOOD TYPING SEROLOGIC RH(D): CPT

## 2023-02-21 PROCEDURE — 36415 COLL VENOUS BLD VENIPUNCTURE: CPT

## 2023-02-22 ENCOUNTER — MYC MEDICAL ADVICE (OUTPATIENT)
Dept: FAMILY MEDICINE | Facility: CLINIC | Age: 48
End: 2023-02-22

## 2023-02-22 DIAGNOSIS — Z86.19 HX OF COLD SORES: ICD-10-CM

## 2023-02-24 ENCOUNTER — MYC MEDICAL ADVICE (OUTPATIENT)
Dept: FAMILY MEDICINE | Facility: CLINIC | Age: 48
End: 2023-02-24
Payer: COMMERCIAL

## 2023-02-24 ENCOUNTER — HOSPITAL ENCOUNTER (OUTPATIENT)
Facility: CLINIC | Age: 48
Discharge: HOME | End: 2023-02-24
Attending: OBSTETRICS & GYNECOLOGY | Admitting: OBSTETRICS & GYNECOLOGY
Payer: COMMERCIAL

## 2023-02-24 ENCOUNTER — ANESTHESIA (OUTPATIENT)
Dept: SURGERY | Facility: CLINIC | Age: 48
End: 2023-02-24
Payer: COMMERCIAL

## 2023-02-24 ENCOUNTER — ANESTHESIA EVENT (OUTPATIENT)
Dept: SURGERY | Facility: CLINIC | Age: 48
End: 2023-02-24
Payer: COMMERCIAL

## 2023-02-24 VITALS
OXYGEN SATURATION: 98 % | DIASTOLIC BLOOD PRESSURE: 83 MMHG | RESPIRATION RATE: 16 BRPM | SYSTOLIC BLOOD PRESSURE: 132 MMHG | TEMPERATURE: 98 F | HEIGHT: 69 IN | HEART RATE: 59 BPM | BODY MASS INDEX: 26.71 KG/M2 | WEIGHT: 180.34 LBS

## 2023-02-24 DIAGNOSIS — Z98.890 S/P DILATION AND CURETTAGE: Primary | ICD-10-CM

## 2023-02-24 LAB
ABO/RH(D): NORMAL
ANTIBODY SCREEN: NEGATIVE
HCG UR QL: NEGATIVE
SPECIMEN EXPIRATION DATE: NORMAL

## 2023-02-24 PROCEDURE — 81025 URINE PREGNANCY TEST: CPT | Performed by: OBSTETRICS & GYNECOLOGY

## 2023-02-24 PROCEDURE — 250N000013 HC RX MED GY IP 250 OP 250 PS 637: Performed by: OBSTETRICS & GYNECOLOGY

## 2023-02-24 PROCEDURE — 360N000076 HC SURGERY LEVEL 3, PER MIN: Performed by: OBSTETRICS & GYNECOLOGY

## 2023-02-24 PROCEDURE — 58300 INSERT INTRAUTERINE DEVICE: CPT | Mod: GC | Performed by: OBSTETRICS & GYNECOLOGY

## 2023-02-24 PROCEDURE — 999N000141 HC STATISTIC PRE-PROCEDURE NURSING ASSESSMENT: Performed by: OBSTETRICS & GYNECOLOGY

## 2023-02-24 PROCEDURE — 250N000009 HC RX 250: Performed by: OBSTETRICS & GYNECOLOGY

## 2023-02-24 PROCEDURE — 258N000003 HC RX IP 258 OP 636: Performed by: NURSE ANESTHETIST, CERTIFIED REGISTERED

## 2023-02-24 PROCEDURE — 86850 RBC ANTIBODY SCREEN: CPT | Performed by: OBSTETRICS & GYNECOLOGY

## 2023-02-24 PROCEDURE — 258N000003 HC RX IP 258 OP 636: Performed by: ANESTHESIOLOGY

## 2023-02-24 PROCEDURE — 36415 COLL VENOUS BLD VENIPUNCTURE: CPT | Performed by: OBSTETRICS & GYNECOLOGY

## 2023-02-24 PROCEDURE — 370N000017 HC ANESTHESIA TECHNICAL FEE, PER MIN: Performed by: OBSTETRICS & GYNECOLOGY

## 2023-02-24 PROCEDURE — 88305 TISSUE EXAM BY PATHOLOGIST: CPT | Mod: TC | Performed by: OBSTETRICS & GYNECOLOGY

## 2023-02-24 PROCEDURE — 250N000011 HC RX IP 250 OP 636: Performed by: OBSTETRICS & GYNECOLOGY

## 2023-02-24 PROCEDURE — 58558 HYSTEROSCOPY BIOPSY: CPT | Mod: GC | Performed by: OBSTETRICS & GYNECOLOGY

## 2023-02-24 PROCEDURE — 250N000009 HC RX 250: Performed by: NURSE ANESTHETIST, CERTIFIED REGISTERED

## 2023-02-24 PROCEDURE — 88305 TISSUE EXAM BY PATHOLOGIST: CPT | Mod: 26 | Performed by: PATHOLOGY

## 2023-02-24 PROCEDURE — 250N000011 HC RX IP 250 OP 636: Performed by: NURSE ANESTHETIST, CERTIFIED REGISTERED

## 2023-02-24 PROCEDURE — 272N000001 HC OR GENERAL SUPPLY STERILE: Performed by: OBSTETRICS & GYNECOLOGY

## 2023-02-24 PROCEDURE — 86901 BLOOD TYPING SEROLOGIC RH(D): CPT | Performed by: OBSTETRICS & GYNECOLOGY

## 2023-02-24 PROCEDURE — 710N000012 HC RECOVERY PHASE 2, PER MINUTE: Performed by: OBSTETRICS & GYNECOLOGY

## 2023-02-24 DEVICE — IUD CONTRACEPTIVE DEVICE MIRENA 50419-4230-01: Type: IMPLANTABLE DEVICE | Site: UTERUS | Status: FUNCTIONAL

## 2023-02-24 RX ORDER — PROPOFOL 10 MG/ML
INJECTION, EMULSION INTRAVENOUS CONTINUOUS PRN
Status: DISCONTINUED | OUTPATIENT
Start: 2023-02-24 | End: 2023-02-24

## 2023-02-24 RX ORDER — ACETAMINOPHEN 325 MG/1
975 TABLET ORAL ONCE
Status: DISCONTINUED | OUTPATIENT
Start: 2023-02-25 | End: 2023-02-24 | Stop reason: HOSPADM

## 2023-02-24 RX ORDER — KETOROLAC TROMETHAMINE 30 MG/ML
INJECTION, SOLUTION INTRAMUSCULAR; INTRAVENOUS PRN
Status: DISCONTINUED | OUTPATIENT
Start: 2023-02-24 | End: 2023-02-24

## 2023-02-24 RX ORDER — IBUPROFEN 800 MG/1
800 TABLET, FILM COATED ORAL ONCE
Status: DISCONTINUED | OUTPATIENT
Start: 2023-02-25 | End: 2023-02-24 | Stop reason: HOSPADM

## 2023-02-24 RX ORDER — LIDOCAINE HYDROCHLORIDE 10 MG/ML
INJECTION, SOLUTION INFILTRATION; PERINEURAL PRN
Status: DISCONTINUED | OUTPATIENT
Start: 2023-02-24 | End: 2023-02-24 | Stop reason: HOSPADM

## 2023-02-24 RX ORDER — SODIUM CHLORIDE, SODIUM LACTATE, POTASSIUM CHLORIDE, CALCIUM CHLORIDE 600; 310; 30; 20 MG/100ML; MG/100ML; MG/100ML; MG/100ML
INJECTION, SOLUTION INTRAVENOUS CONTINUOUS PRN
Status: DISCONTINUED | OUTPATIENT
Start: 2023-02-24 | End: 2023-02-24

## 2023-02-24 RX ORDER — LIDOCAINE 40 MG/G
CREAM TOPICAL
Status: DISCONTINUED | OUTPATIENT
Start: 2023-02-24 | End: 2023-02-24 | Stop reason: HOSPADM

## 2023-02-24 RX ORDER — ASCORBIC ACID 100 MG
TABLET,CHEWABLE ORAL
COMMUNITY

## 2023-02-24 RX ORDER — IBUPROFEN 800 MG/1
800 TABLET, FILM COATED ORAL EVERY 6 HOURS PRN
Qty: 30 TABLET | Refills: 0 | Status: SHIPPED | OUTPATIENT
Start: 2023-02-24 | End: 2023-03-28

## 2023-02-24 RX ORDER — OXYCODONE HYDROCHLORIDE 5 MG/1
5 TABLET ORAL
Status: DISCONTINUED | OUTPATIENT
Start: 2023-02-24 | End: 2023-02-24 | Stop reason: HOSPADM

## 2023-02-24 RX ORDER — ACETAMINOPHEN 325 MG/1
975 TABLET ORAL EVERY 6 HOURS PRN
Qty: 50 TABLET | Refills: 0 | Status: SHIPPED | OUTPATIENT
Start: 2023-02-24 | End: 2023-03-28

## 2023-02-24 RX ORDER — PROPOFOL 10 MG/ML
INJECTION, EMULSION INTRAVENOUS PRN
Status: DISCONTINUED | OUTPATIENT
Start: 2023-02-24 | End: 2023-02-24

## 2023-02-24 RX ORDER — LIDOCAINE HYDROCHLORIDE 20 MG/ML
INJECTION, SOLUTION INFILTRATION; PERINEURAL PRN
Status: DISCONTINUED | OUTPATIENT
Start: 2023-02-24 | End: 2023-02-24

## 2023-02-24 RX ORDER — FENTANYL CITRATE 50 UG/ML
INJECTION, SOLUTION INTRAMUSCULAR; INTRAVENOUS PRN
Status: DISCONTINUED | OUTPATIENT
Start: 2023-02-24 | End: 2023-02-24

## 2023-02-24 RX ORDER — VALACYCLOVIR HYDROCHLORIDE 1 G/1
2000 TABLET, FILM COATED ORAL 2 TIMES DAILY
Qty: 4 TABLET | Refills: 3 | Status: SHIPPED | OUTPATIENT
Start: 2023-02-24 | End: 2023-03-03

## 2023-02-24 RX ORDER — ONDANSETRON 2 MG/ML
INJECTION INTRAMUSCULAR; INTRAVENOUS PRN
Status: DISCONTINUED | OUTPATIENT
Start: 2023-02-24 | End: 2023-02-24

## 2023-02-24 RX ORDER — DEXAMETHASONE SODIUM PHOSPHATE 4 MG/ML
INJECTION, SOLUTION INTRA-ARTICULAR; INTRALESIONAL; INTRAMUSCULAR; INTRAVENOUS; SOFT TISSUE PRN
Status: DISCONTINUED | OUTPATIENT
Start: 2023-02-24 | End: 2023-02-24

## 2023-02-24 RX ORDER — ACETAMINOPHEN 325 MG/1
975 TABLET ORAL ONCE
Status: COMPLETED | OUTPATIENT
Start: 2023-02-24 | End: 2023-02-24

## 2023-02-24 RX ORDER — SODIUM CHLORIDE, SODIUM LACTATE, POTASSIUM CHLORIDE, CALCIUM CHLORIDE 600; 310; 30; 20 MG/100ML; MG/100ML; MG/100ML; MG/100ML
INJECTION, SOLUTION INTRAVENOUS CONTINUOUS
Status: DISCONTINUED | OUTPATIENT
Start: 2023-02-24 | End: 2023-02-24 | Stop reason: HOSPADM

## 2023-02-24 RX ORDER — FOLIC ACID 1 MG/1
1 TABLET ORAL DAILY
COMMUNITY

## 2023-02-24 RX ORDER — FERROUS SULFATE 325(65) MG
325 TABLET ORAL
COMMUNITY

## 2023-02-24 RX ADMIN — FENTANYL CITRATE 50 MCG: 50 INJECTION, SOLUTION INTRAMUSCULAR; INTRAVENOUS at 16:25

## 2023-02-24 RX ADMIN — MIDAZOLAM 2 MG: 1 INJECTION INTRAMUSCULAR; INTRAVENOUS at 16:20

## 2023-02-24 RX ADMIN — PROPOFOL 70 MG: 10 INJECTION, EMULSION INTRAVENOUS at 16:25

## 2023-02-24 RX ADMIN — PROPOFOL 200 MCG/KG/MIN: 10 INJECTION, EMULSION INTRAVENOUS at 16:25

## 2023-02-24 RX ADMIN — ACETAMINOPHEN 325MG 975 MG: 325 TABLET ORAL at 15:34

## 2023-02-24 RX ADMIN — SODIUM CHLORIDE, POTASSIUM CHLORIDE, SODIUM LACTATE AND CALCIUM CHLORIDE: 600; 310; 30; 20 INJECTION, SOLUTION INTRAVENOUS at 15:46

## 2023-02-24 RX ADMIN — SODIUM CHLORIDE, POTASSIUM CHLORIDE, SODIUM LACTATE AND CALCIUM CHLORIDE: 600; 310; 30; 20 INJECTION, SOLUTION INTRAVENOUS at 16:20

## 2023-02-24 RX ADMIN — LIDOCAINE HYDROCHLORIDE 60 MG: 20 INJECTION, SOLUTION INFILTRATION; PERINEURAL at 16:25

## 2023-02-24 RX ADMIN — PHENYLEPHRINE HYDROCHLORIDE 100 MCG: 10 INJECTION INTRAVENOUS at 16:44

## 2023-02-24 RX ADMIN — KETOROLAC TROMETHAMINE 30 MG: 30 INJECTION, SOLUTION INTRAMUSCULAR at 16:53

## 2023-02-24 RX ADMIN — ONDANSETRON 4 MG: 2 INJECTION INTRAMUSCULAR; INTRAVENOUS at 16:29

## 2023-02-24 RX ADMIN — DEXAMETHASONE SODIUM PHOSPHATE 4 MG: 4 INJECTION, SOLUTION INTRA-ARTICULAR; INTRALESIONAL; INTRAMUSCULAR; INTRAVENOUS; SOFT TISSUE at 16:29

## 2023-02-24 ASSESSMENT — ACTIVITIES OF DAILY LIVING (ADL)
ADLS_ACUITY_SCORE: 35
ADLS_ACUITY_SCORE: 35

## 2023-02-24 NOTE — TELEPHONE ENCOUNTER
Dr. Patel-Please review and sign if agree. Patient requesting Valtrex refills.  RN unable to authorize because medication no longer active on med list.    Creatinine   Date Value Ref Range Status   12/26/2022 0.71 0.51 - 0.95 mg/dL Final   05/18/2018 0.63 0.52 - 1.04 mg/dL Final     Last visit: 1/27/23 with Dr. Patel    Writer responded via [employer].    Thank you!  MICHELLE ReedN, RN-[facility]

## 2023-02-24 NOTE — DISCHARGE INSTRUCTIONS
Same-Day Surgery   Adult Discharge Orders & Instructions     For 24 hours after surgery:  Get plenty of rest.  A responsible adult must stay with you for at least 24 hours after you leave the hospital.   Pain medication can slow your reflexes. Do not drive or use heavy equipment.  If you have weakness or tingling, don't drive or use heavy equipment until this feeling goes away.  Mixing alcohol and pain medication can cause dizziness and slow your breathing. It can even be fatal. Do not drink alcohol while taking pain medication.  Avoid strenuous or risky activities.  Ask for help when climbing stairs.   You may feel lightheaded.  If so, sit for a few minutes before standing.  Have someone help you get up.   If you have nausea (feel sick to your stomach), drink only clear liquids such as apple juice, ginger ale, broth or 7-Up.  Rest may also help.  Be sure to drink enough fluids.  Move to a regular diet as you feel able. Take pain medications with a small amount of solid food, such as toast or crackers, to avoid nausea.   A slight fever is normal. Call the doctor if your fever is over 100 F (37.7 C) (taken under the tongue) or lasts longer than 24 hours.  You may have a dry mouth, muscle aches, trouble sleeping or a sore throat.  These symptoms should go away after 24 hours.  Do not make important or legal decisions.   Pain Management:      1. Take pain medication (if prescribed) for pain as directed by your physician.        2. WARNING: If the pain medication you have been prescribed contains Tylenol  (acetaminophen), DO NOT take additional doses of Tylenol (acetaminophen).     Call your doctor for any of the followin.  Signs of infection (fever, growing tenderness at the surgery site, severe pain, a large amount of drainage or bleeding, foul-smelling drainage, redness, swelling).    2.  It has been over 8 to 10 hours since surgery and you are still not able to urinate (pee).    3.  Headache for over 24  hours.    4.  Numbness, tingling or weakness the day after surgery (if you had spinal anesthesia).  To contact a doctor, call ______________Dr. Nyasia Jacques, OB/GYN clinic at [phone] _______________________ or:  '   [phone] and ask for the Resident On Call for:          _________OBGYN_________________________________ (answered 24 hours a day)  '   Emergency Department:  [facility]: [phone]  [facility] [facility]: [phone]               Rev. 10/2014    What happens after hysteroscopy?  You may have cramps and bleeding for 24 hours after the procedure. This is normal. Use pads instead of tampons.  Do not douche or use tampons until your health care provider says it s OK.  Do not use any vaginal medicines until you are told it s OK.  Ask your health care provider when it s OK to have sex again.  When should I call my health care provider?  Call your health care provider if you have:  Heavy bleeding (more than 1 pad an hour for 2 or more hours)  A fever over 100.4 F (38.0 C)  Increasing abdominal pain or tenderness  Foul-smelling discharge  Follow-up care  Schedule a follow-up visit with your health care provider. Based on the results of your test, you may need more treatment. Be sure to follow instructions and keep your appointments.    Important numbers  [facility] ([address]) [facility]: [phone]   [facility] ([address]) : [phone]      [id number] [employer]. [address] [place] [address], [place] [address]. All rights reserved. This information is not intended as a substitute for professional medical care. Always follow your healthcare professional's instructions.          Discharge Instructions: Following a Dilation   and Curettage/Dilation and Evacuation    What to expect:  Expect small to moderate amount of vaginal bleeding which should taper off in 4-5 days. It should not be heavier than your  regular menstrual flow.  Do not douche, and use a pad rather than tampons.   No intercourse until bleeding has ceased.    Activity:  Rest the day of surgery. You may resume normal activity the next day.  You may bathe or shower.  Avoid heavy lifting (10-15 lbs) for one week.    Comfort:  The amount of discomfort you can expect is very unpredictable. If you have pain that cannot be controlled with non-aspirin pain relievers or with the prescription you may have received, you should notify your doctor.  Abdominal cramping (like menstrual cramps) or low back ache are common and should not be a cause for concern. You will be drowsy and weak the day of surgery and possibly the following day.    Diet:  You have no restrictions on your diet. Following surgery, drink plenty of fluids and eat a light meal.    Nausea:  The anesthesia medications you received during your surgical procedure may produce some nausea.  If you feel nauseated, stay in bed, keep your head down and try drinking fluids such as Seven-Up, tea or soup.    Notify Physician at once if you experience:  A fever over 100.4 degrees (a low grade fever under 100 degrees is usual after surgery).  Heavy flow and/or passing large clots. Saturating more than 1 pad per hour for 2 or more hours.   Severe pain or cramps.    Important numbers  [facility] ([address]) [facility]: [phone]   [facility] ([address]) : [phone]  Rev. 5/12     Dr. Nyasia Jacques, OB/GYN clinic at [phone]     Same-Day Surgery   Adult Discharge Orders & Instructions     For 24 hours after surgery:  Get plenty of rest.  A responsible adult must stay with you for at least 24 hours after you leave the hospital.   Pain medication can slow your reflexes. Do not drive or use heavy equipment.  If you have weakness or tingling, don't drive or use heavy equipment until this feeling goes away.  Mixing alcohol and pain medication can cause  dizziness and slow your breathing. It can even be fatal. Do not drink alcohol while taking pain medication.  Avoid strenuous or risky activities.  Ask for help when climbing stairs.   You may feel lightheaded.  If so, sit for a few minutes before standing.  Have someone help you get up.   If you have nausea (feel sick to your stomach), drink only clear liquids such as apple juice, ginger ale, broth or 7-Up.  Rest may also help.  Be sure to drink enough fluids.  Move to a regular diet as you feel able. Take pain medications with a small amount of solid food, such as toast or crackers, to avoid nausea.   A slight fever is normal. Call the doctor if your fever is over 100 F (37.7 C) (taken under the tongue) or lasts longer than 24 hours.  You may have a dry mouth, muscle aches, trouble sleeping or a sore throat.  These symptoms should go away after 24 hours.  Do not make important or legal decisions.   Pain Management:      1. Take pain medication (if prescribed) for pain as directed by your physician.        2. WARNING: If the pain medication you have been prescribed contains Tylenol  (acetaminophen), DO NOT take additional doses of Tylenol (acetaminophen).     Call your doctor for any of the followin.  Signs of infection (fever, growing tenderness at the surgery site, severe pain, a large amount of drainage or bleeding, foul-smelling drainage, redness, swelling).    2.  It has been over 8 to 10 hours since surgery and you are still not able to urinate (pee).    3.  Headache for over 24 hours.    4.  Numbness, tingling or weakness the day after surgery (if you had spinal anesthesia).  To contact a doctor, call _____________________________________ or:  '   [phone] and ask for the Resident On Call for:          __________________________________________ (answered 24 hours a day)  '   Emergency Department:  [facility]: [phone]  [facility]:  [phone]               Rev. 10/2014

## 2023-02-24 NOTE — ANESTHESIA CARE TRANSFER NOTE
Patient: Abigail Harvey    Procedure: Procedure(s):  HYSTEROSCOPY, WITH DILATION AND CURETTAGE OF UTERUS USING MORCELLATOR  INSERTION, INTRAUTERINE DEVICE MIRENA       Diagnosis: Endometrial polyp [N84.0]  Diagnosis Additional Information: No value filed.    Anesthesia Type:   General     Note:    Oropharynx: oropharynx clear of all foreign objects and spontaneously breathing  Level of Consciousness: drowsy and awake  Oxygen Supplementation: room air    Independent Airway: airway patency satisfactory and stable  Dentition: dentition unchanged  Vital Signs Stable: post-procedure vital signs reviewed and stable  Report to RN Given: handoff report given  Patient transferred to: Phase II    Handoff Report: Identifed the Patient, Identified the Reponsible Provider, Reviewed the pertinent medical history, Discussed the surgical course, Reviewed Intra-OP anesthesia mangement and issues during anesthesia, Set expectations for post-procedure period and Allowed opportunity for questions and acknowledgement of understanding      Vitals:  Vitals Value Taken Time   /78 02/24/23 1701   Temp 36.6    Pulse 78 02/24/23 1701   Resp 12    SpO2 100 % 02/24/23 1704   Vitals shown include unvalidated device data.    Electronically Signed By: KATHERINE MILLAN APRN CRNA  February 24, 2023  5:05 PM

## 2023-02-24 NOTE — OP NOTE
[facility] Operative Note    Patient: Abigail Harvey  : 1975  MRN: [id number]    Date of Service: 2023    Pre-operative diagnosis:  -AUB in perimenopausal setting  -cervical polyp    Post-operative diagnosis:  Same s/p procedure below    Procedure: Exam under anesthesia, Hysteroscopy, Dilation and Curettage, insertion of Mirena IUD,       Surgeon: Surgeon(s) and Role:     * Nyasia Jacques MD - Primary     * Ernst rBower MD - Resident - Assisting    Anesthesia: MAC and paracervical block    EBL: 5 mL  Urine: voided prior  IV Fluids: 400  Fluid deficit: 180 mL normal saline    Specimens: endometrial curettings    Implants: Mirena IUD    Complications: none apparent    Findings: Exam under anesthesia revealed anteverted uterus, appropriate size, mobile with no adnexal masses. External genitalia normal, well rugated vagina. Cervix closed.  Hysteroscopic exam revealed polypoid tissue in endocervical canal. Otherwise normal endometrial and cervical canal; bilateral ostia visualized and normal. Fluid deficit 180mL normal saline. Tenaculum sites hemostatic at end of case. IUD inserted successfully    Indications: Abigail Harvey is a 47 year old female who presented with abnormal uterine bleeding in perimenopausal setting.  Risks, benefits, and alternatives to the procedure were discussed. The patient's questions were answered, understanding confirmed, and the patient signed written informed consent.    Technique: The patient was taken to the operating room where SCDs were placed prior to anesthesia. No antibiotics given. She was placed under MAC anesthesia.  After the patient was comfortable, she was placed in the dorsal lithotomy position with feet in stirrups.  An exam under anesthesia was performed with findings as noted above. The patient was prepped and draped in the usual sterile fashion. A speculum was placed in the vagina and the cervix visualized. A single-toothed tenaculum was placed on  the anterior lip of the cervix at 12 o'clock after infiltrating with 1mL 1% lidocaine plain.  A paracervical block was placed at 4 and 8-o'clock with the same local anesthetic; for total of 20mL used. The cervical os was carefully dilated to 6.5mm with sequential hegars dilators with little difficulty. The operating hysteroscope was placed through the cervix with outflow turned on to achieve hydrodilation of the cervix while entering into the uterine cavity. Examination of the uterine cavity demonstrated polypoid tissue. The remainder of the cavity was unremarkable. The hysteroscopic morcellator was then introduced and used to remove the tissue with good success and get global sampling. The hysteroscope apparatus was removed and total of 180  mL fluid deficit of normal saline noted. The curettings were sent to pathology. Attention was turned to the cervix where it was sounded to 9cm and the IUD was inserted with appropriate technique. Stings were cut to 3cm.  The tenaculum was removed from the cervix and sites were noted to be hemostatic. The speculum was removed from the vagina.     Instrument, needle and sponge counts were correct x2. Dr. Jacques was present and scrubbed for the entire procedure. The patient tolerated the procedure well. The patient was awoken in the OR and transferred to the PACU in stable condition.     Ernst Brower DO, MA  OBGYN PGY-1  5:16 PM February 24, 2023      Physician Attestation   I spent a total of 30 minutes with the patient, personally operating as the resident performed Exam under anesthesia, Hysteroscopy, Dilation and Curettage, insertion of Mirena IUD,       Nyasia Jacques MD  Date of Service (when I saw the patient): 02/24/23

## 2023-02-24 NOTE — ANESTHESIA PREPROCEDURE EVALUATION
Anesthesia Pre-Procedure Evaluation    Patient: Abigail Harevy   MRN: [id number] : 1975        Procedure : Procedure(s):  HYSTEROSCOPY, WITH DILATION AND CURETTAGE OF UTERUS USING MORCELLATOR  INSERTION, INTRAUTERINE DEVICE MIRENA          Past Medical History:   Diagnosis Date     Adjustment disorder with mixed anxiety and depressed mood 11/10/2020     Injury of left thumb, initial encounter 2017     Irritability 11/10/2020     Lyme disease 2018     Pain in joint involving ankle and foot, left 2022     Sprain of left ankle, unspecified ligament, initial encounter 2022     Thumb pain, left 3/23/2017      Past Surgical History:   Procedure Laterality Date      SECTION      x2     COLONOSCOPY N/A 2022    Procedure: COLONOSCOPY, WITH POLYPECTOMY;  Surgeon: Marivel Benz MD;  [place] [facility] [place]      TOOTH EXTRACTION W/FORCEP        No Known Allergies   Social History     Tobacco Use     Smoking status: Never     Smokeless tobacco: Never   Substance Use Topics     Alcohol use: Yes     Comment: occasional      Wt Readings from Last 1 Encounters:   23 81.8 kg (180 lb 5.4 oz)        Anesthesia Evaluation   Pt has had prior anesthetic.     No history of anesthetic complications       ROS/MED HX  ENT/Pulmonary:     (+) Intermittent, asthma recent URI, resolved, 1 month ago covid. in usual state of health x 2 wk:     Neurologic:  - neg neurologic ROS     Cardiovascular:     (+) hypertension----- (-) murmur   METS/Exercise Tolerance:     Hematologic:    (-) anemia   Musculoskeletal:       GI/Hepatic: Comment: H/o serrated adenoma of colon      Renal/Genitourinary:  - neg Renal ROS     Endo: Comment: Heavy periods. Uterine polyps   (-) obesity   Psychiatric/Substance Use:     (+) psychiatric history anxiety and depression (adjustment d/o)     Infectious Disease:       Malignancy:       Other:     (-) Any chance pregnant       Physical Exam    Airway  airway exam normal       Mallampati: II   TM distance: > 3 FB   Neck ROM: full   Mouth opening: > 3 cm    Respiratory Devices and Support         Dental       (+) Minor Abnormalities - some fillings, tiny chips      Cardiovascular   cardiovascular exam normal       Rhythm and rate: regular and normal (-) no murmur    Pulmonary   pulmonary exam normal        breath sounds clear to auscultation           OUTSIDE LABS:  CBC:   Lab Results   Component Value Date    WBC 8.1 02/21/2023    WBC 6.3 01/25/2023    HGB 11.7 02/21/2023    HGB 9.8 (L) 01/25/2023    HCT 35.4 02/21/2023    HCT 30.6 (L) 01/25/2023     02/21/2023     01/25/2023     BMP:   Lab Results   Component Value Date     12/26/2022     05/02/2022    POTASSIUM 4.5 12/26/2022    POTASSIUM 4.1 05/02/2022    CHLORIDE 102 12/26/2022    CHLORIDE 104 05/02/2022    CO2 25 12/26/2022    CO2 25 05/02/2022    BUN 8.0 12/26/2022    BUN 9 05/02/2022    CR 0.71 12/26/2022    CR 0.68 05/02/2022    GLC 95 12/26/2022    GLC 83 05/02/2022     COAGS: No results found for: PTT, INR, FIBR  POC:   Lab Results   Component Value Date    HCG Negative 02/24/2023     HEPATIC:   Lab Results   Component Value Date    ALBUMIN 3.8 05/02/2022    PROTTOTAL 7.4 05/02/2022    ALT 34 05/02/2022    AST 27 05/02/2022    ALKPHOS 61 05/02/2022    BILITOTAL 0.3 05/02/2022     OTHER:   Lab Results   Component Value Date    A1C 5.0 05/02/2022    SOPHIE 8.9 12/26/2022    TSH 1.31 05/02/2022       Anesthesia Plan    ASA Status:  2   NPO Status:  NPO Appropriate    Anesthesia Type: General.     - Airway: Native airway   Induction: Propofol.   Maintenance: TIVA.        Consents    Anesthesia Plan(s) and associated risks, benefits, and realistic alternatives discussed. Questions answered and patient/representative(s) expressed understanding.    - Discussed:     - Discussed with:  Patient      - Extended Intubation/Ventilatory Support Discussed: No.      - Patient is DNR/DNI Status: No    Use of blood  products discussed: No .     Postoperative Care    Pain management: Multi-modal analgesia, Oral pain medications.   PONV prophylaxis: Background Propofol Infusion, Ondansetron (or other 5HT-3)     Comments:    Other Comments: Discussed risks of anesthesia including nausea, vomiting, sore throat, dental damage, cardiopulmonary complications, neurologic complications, and serious complications.            January Nichols MD

## 2023-02-24 NOTE — H&P
[facility]    GYN   Pre-op History and Physical    Abigail Harvey MRN# [id number]   Age: 47 year old YOB: 1975     Date of Admission:  2023    Primary care provider: Beryl Patel    HPI:  Abigail Harvey is a 47 year old  with PMH of perimenopausal bleeding who presents for scheduled hysteroscopy, D&C and insertion of Mirena IUD with Dr. Jacques.     She is feeling well today and denies recent illness, F/C, loss of taste/smell, cough, sore throat, chest pain, shortness of breath, nausea/vomiting. Her health has not changed since her last visit with Dr. Luna on 23. She denies history of intolerance of anesthesia.     PMH:  Past Medical History:   Diagnosis Date     Adjustment disorder with mixed anxiety and depressed mood 11/10/2020     Injury of left thumb, initial encounter 2017     Irritability 11/10/2020     Lyme disease 2018     Pain in joint involving ankle and foot, left 2022     Sprain of left ankle, unspecified ligament, initial encounter 2022     Thumb pain, left 3/23/2017       PSH:  Past Surgical History:   Procedure Laterality Date      SECTION      x2     COLONOSCOPY N/A 2022    Procedure: COLONOSCOPY, WITH POLYPECTOMY;  Surgeon: Marivel Benz MD;  Location: [facility] [place]      TOOTH EXTRACTION W/FORCEP         Medications:  No current facility-administered medications on file prior to encounter.  ADVAIR -21 MCG/ACT inhaler, Inhale 2 puffs into the lungs 2 times daily  albuterol (PROAIR HFA/PROVENTIL HFA/VENTOLIN HFA) 108 (90 Base) MCG/ACT inhaler, Inhale 2 puffs into the lungs every 6 hours as needed for shortness of breath / dyspnea or wheezing  Ascorbic Acid (VITAMIN C) 100 MG CHEW,   BINAXNOW COVID-19 AG HOME TEST KIT, TEST AS DIRECTED TODAY  ferrous sulfate (FEROSUL) 325 (65 Fe) MG tablet, Take 325 mg by mouth daily (with breakfast)  folic acid (FOLVITE) 1 MG tablet, Take 1 mg by mouth daily  norethindrone (MICRONOR) 0.35  "MG tablet, Take 1 tablet (0.35 mg) by mouth daily        Social History:  Social History     Tobacco Use     Smoking status: Never     Smokeless tobacco: Never   Vaping Use     Vaping Use: Never used   Substance Use Topics     Alcohol use: Yes     Comment: occasional     Drug use: No       Family History:  Family History   Problem Relation Age of Onset     Hypertension Mother      Heart Disease Father         heart attack     Cancer Father         lung       Allergies:  No Known Allergies    Physical Exam    BP (!) 130/97 (BP Location: Right arm)   Pulse 68   Temp 98  F (36.7  C) (Oral)   Resp 20   Ht 1.74 m (5' 8.5\")   Wt 81.8 kg (180 lb 5.4 oz)   LMP 02/10/2023 (Approximate)   SpO2 100%   BMI 27.02 kg/m      Gen: NAD, sitting in bed  CV: RRR, no m/r/g  Resp: NWOB, CTAB  Abd: Soft, nontender, nondistended  Ext: Warm, well perfused, trace edema  Neuro: Moving all 4 ext    Assessment/Plan:  Abigail Harvey is a 47 year old  with PMH of AUB who presents for scheduled hysteroscopy, D&C and IUD insertion with Dr. Jacques. Her health is unchanged from recent visit with Dr. Dyer on 23 .      Plan to proceed with procedure as scheduled. Informed consent signed and all questions answered. This is a same day procedure and patient will be discharged to home following recovery from anesthesia.     Discussed with Dr. Jacques.    Ernst Brower DO, MA   [id number]    Physician Attestation   I personally examined and evaluated this patient.  I discussed the patient with the resident/fellow and care team, and agree with the assessment and plan of care as documented in the note.       Nyasia Jacques MD  Date of Service (when I saw the patient): 23    "

## 2023-03-02 NOTE — TELEPHONE ENCOUNTER
Valtrex was filled 2/23/23  Please see chart  As advised previously correct dosing for cold sres is valtrex 2 grams twice a day for 1 day as onset of an outbreak of herpes. It comes in 100 mg tab so given  #4 with 3 refilsl that should take care of 4 episodes in a 12 month period   Triage please clarify what is going on . So I prescribed as clinically indicated with

## 2023-03-03 RX ORDER — VALACYCLOVIR HYDROCHLORIDE 1 G/1
2000 TABLET, FILM COATED ORAL 2 TIMES DAILY
Qty: 4 TABLET | Refills: 3 | Status: SHIPPED | OUTPATIENT
Start: 2023-03-03 | End: 2023-03-08

## 2023-03-03 NOTE — TELEPHONE ENCOUNTER
Prescription resent to preferred pharmacy.  [facility] message sent to patient.  BONIFACIO Tatum RN  [facility]

## 2023-03-08 RX ORDER — VALACYCLOVIR HYDROCHLORIDE 1 G/1
2000 TABLET, FILM COATED ORAL 2 TIMES DAILY
Qty: 4 TABLET | Refills: 3 | Status: SHIPPED | OUTPATIENT
Start: 2023-03-08

## 2023-03-13 ENCOUNTER — MYC MEDICAL ADVICE (OUTPATIENT)
Dept: FAMILY MEDICINE | Facility: CLINIC | Age: 48
End: 2023-03-13
Payer: COMMERCIAL

## 2023-03-14 ENCOUNTER — TELEPHONE (OUTPATIENT)
Dept: FAMILY MEDICINE | Facility: CLINIC | Age: 48
End: 2023-03-14
Payer: COMMERCIAL

## 2023-03-14 DIAGNOSIS — I10 ESSENTIAL HYPERTENSION: ICD-10-CM

## 2023-03-14 RX ORDER — AMLODIPINE BESYLATE 2.5 MG/1
2.5 TABLET ORAL DAILY
Qty: 90 TABLET | Refills: 1 | Status: CANCELLED | OUTPATIENT
Start: 2023-03-14

## 2023-03-14 NOTE — TELEPHONE ENCOUNTER
It is hard to say one-time if BP elevation 150/90 at home with pounding heart due to to blood pressure or from anxiety.  Recommend she stop by urgent care to have them check her blood pressure and assess heart until I can see her on the 28.  In the meantime if  wants to can increase amlodipine to 5 mg as only has been on 2.5 and see if that helps  Was given 90 tablets with 1 refill in December by Cathryn so should have plenty to double up for now  I myself have never prescribed or started her on this medication.  Recommend she take 2 tablets of the 2.5 to equal 5 mg until can be evaluated by urgent care or /and until I see her in clinic  To help blood pressure would also recommend low-salt diet, avoid alcohol, avoid anti-inflammatories limit caffeine increase physical activity and seek stress reducing measures including may be a counselor or medication, yoga etc.

## 2023-03-14 NOTE — TELEPHONE ENCOUNTER
PT calling about high BP.  PT reported last BP =150/90.  Pt asking if she should take more amlodipine.    PT reports feeling her heart beat. Feels like heart is pounding fast. She feels this sitting or laying in bed.    PT made the soonest appt with Dr. Patel- 3/28/23  Pt had originally been scheduled for an appt in late April.  Cancelled this appt per pt.    MARLENA De La Paz

## 2023-03-14 NOTE — TELEPHONE ENCOUNTER
Pt was contacted.   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

## 2023-03-17 NOTE — TELEPHONE ENCOUNTER
Were these values on 2.5 mg or 5 mg amlodipine  If Bp consistently < 140/90 on 2.5 mg of amlodipine no need to increase before apt   If consistently above it then go ahead take 5 mg ( double med)

## 2023-03-17 NOTE — TELEPHONE ENCOUNTER
Dr. Patel: please see pt messages.  More recent BP was normal    Hello, I scheduled an appointment for April 21 to see you about my blood pressure. Today my BP is 150/90. I am concerned about my BP because my heart has been beating so hard when I am not doing anything, such as waking me up from sleep and when I am just standing.      Can you please advise me about whether I should increase my dosage of blood pressure medication before the visit on April 21? I was hoping for a sooner appointment but the 21st was the first available  ---  On Wednesday 3/15, 128/78. Pulse was normal that day, I don't remember the number though.     Frances YOO RN  [facility]

## 2023-03-28 ENCOUNTER — OFFICE VISIT (OUTPATIENT)
Dept: FAMILY MEDICINE | Facility: CLINIC | Age: 48
End: 2023-03-28
Payer: COMMERCIAL

## 2023-03-28 VITALS
OXYGEN SATURATION: 98 % | HEIGHT: 68 IN | BODY MASS INDEX: 27.41 KG/M2 | RESPIRATION RATE: 20 BRPM | DIASTOLIC BLOOD PRESSURE: 80 MMHG | WEIGHT: 180.9 LBS | SYSTOLIC BLOOD PRESSURE: 120 MMHG | HEART RATE: 72 BPM | TEMPERATURE: 97 F

## 2023-03-28 DIAGNOSIS — N92.0 EXCESSIVE OR FREQUENT MENSTRUATION: ICD-10-CM

## 2023-03-28 DIAGNOSIS — D12.6 SERRATED ADENOMA OF COLON: ICD-10-CM

## 2023-03-28 DIAGNOSIS — R00.2 PALPITATIONS: ICD-10-CM

## 2023-03-28 DIAGNOSIS — F43.23 ADJUSTMENT DISORDER WITH MIXED ANXIETY AND DEPRESSED MOOD: ICD-10-CM

## 2023-03-28 DIAGNOSIS — Z12.31 VISIT FOR SCREENING MAMMOGRAM: ICD-10-CM

## 2023-03-28 DIAGNOSIS — Z97.5 IUD (INTRAUTERINE DEVICE) IN PLACE: ICD-10-CM

## 2023-03-28 DIAGNOSIS — Z86.19 HX OF COLD SORES: ICD-10-CM

## 2023-03-28 DIAGNOSIS — J45.30 MILD PERSISTENT ASTHMA WITHOUT COMPLICATION: ICD-10-CM

## 2023-03-28 DIAGNOSIS — I10 BENIGN ESSENTIAL HYPERTENSION: Primary | ICD-10-CM

## 2023-03-28 LAB
ALBUMIN SERPL BCG-MCNC: 4.3 G/DL (ref 3.5–5.2)
ALP SERPL-CCNC: 76 U/L (ref 35–104)
ALT SERPL W P-5'-P-CCNC: 16 U/L (ref 10–35)
ANION GAP SERPL CALCULATED.3IONS-SCNC: 12 MMOL/L (ref 7–15)
AST SERPL W P-5'-P-CCNC: 25 U/L (ref 10–35)
BASOPHILS # BLD AUTO: 0 10E3/UL (ref 0–0.2)
BASOPHILS NFR BLD AUTO: 1 %
BILIRUB SERPL-MCNC: 0.2 MG/DL
BUN SERPL-MCNC: 8.3 MG/DL (ref 6–20)
CALCIUM SERPL-MCNC: 9.1 MG/DL (ref 8.6–10)
CHLORIDE SERPL-SCNC: 104 MMOL/L (ref 98–107)
CREAT SERPL-MCNC: 0.69 MG/DL (ref 0.51–0.95)
DEPRECATED HCO3 PLAS-SCNC: 24 MMOL/L (ref 22–29)
EOSINOPHIL # BLD AUTO: 0.2 10E3/UL (ref 0–0.7)
EOSINOPHIL NFR BLD AUTO: 2 %
ERYTHROCYTE [DISTWIDTH] IN BLOOD BY AUTOMATED COUNT: 17.3 % (ref 10–15)
GFR SERPL CREATININE-BSD FRML MDRD: >90 ML/MIN/1.73M2
GLUCOSE SERPL-MCNC: 85 MG/DL (ref 70–99)
HCT VFR BLD AUTO: 38.8 % (ref 35–47)
HGB BLD-MCNC: 12.9 G/DL (ref 11.7–15.7)
IMM GRANULOCYTES # BLD: 0 10E3/UL
IMM GRANULOCYTES NFR BLD: 0 %
LYMPHOCYTES # BLD AUTO: 2 10E3/UL (ref 0.8–5.3)
LYMPHOCYTES NFR BLD AUTO: 28 %
MCH RBC QN AUTO: 26.8 PG (ref 26.5–33)
MCHC RBC AUTO-ENTMCNC: 33.2 G/DL (ref 31.5–36.5)
MCV RBC AUTO: 81 FL (ref 78–100)
MONOCYTES # BLD AUTO: 0.5 10E3/UL (ref 0–1.3)
MONOCYTES NFR BLD AUTO: 7 %
NEUTROPHILS # BLD AUTO: 4.4 10E3/UL (ref 1.6–8.3)
NEUTROPHILS NFR BLD AUTO: 62 %
PLATELET # BLD AUTO: 246 10E3/UL (ref 150–450)
POTASSIUM SERPL-SCNC: 4.1 MMOL/L (ref 3.4–5.3)
PROT SERPL-MCNC: 7.2 G/DL (ref 6.4–8.3)
RBC # BLD AUTO: 4.81 10E6/UL (ref 3.8–5.2)
SODIUM SERPL-SCNC: 140 MMOL/L (ref 136–145)
TSH SERPL DL<=0.005 MIU/L-ACNC: 0.92 UIU/ML (ref 0.3–4.2)
WBC # BLD AUTO: 7.1 10E3/UL (ref 4–11)

## 2023-03-28 PROCEDURE — 99214 OFFICE O/P EST MOD 30 MIN: CPT | Performed by: FAMILY MEDICINE

## 2023-03-28 PROCEDURE — 36415 COLL VENOUS BLD VENIPUNCTURE: CPT | Performed by: FAMILY MEDICINE

## 2023-03-28 PROCEDURE — 80050 GENERAL HEALTH PANEL: CPT | Performed by: FAMILY MEDICINE

## 2023-03-28 ASSESSMENT — ANXIETY QUESTIONNAIRES
1. FEELING NERVOUS, ANXIOUS, OR ON EDGE: NOT AT ALL
5. BEING SO RESTLESS THAT IT IS HARD TO SIT STILL: NOT AT ALL
GAD7 TOTAL SCORE: 0
6. BECOMING EASILY ANNOYED OR IRRITABLE: NOT AT ALL
7. FEELING AFRAID AS IF SOMETHING AWFUL MIGHT HAPPEN: NOT AT ALL
GAD7 TOTAL SCORE: 0
4. TROUBLE RELAXING: NOT AT ALL
8. IF YOU CHECKED OFF ANY PROBLEMS, HOW DIFFICULT HAVE THESE MADE IT FOR YOU TO DO YOUR WORK, TAKE CARE OF THINGS AT HOME, OR GET ALONG WITH OTHER PEOPLE?: NOT DIFFICULT AT ALL
2. NOT BEING ABLE TO STOP OR CONTROL WORRYING: NOT AT ALL
GAD7 TOTAL SCORE: 0
3. WORRYING TOO MUCH ABOUT DIFFERENT THINGS: NOT AT ALL
7. FEELING AFRAID AS IF SOMETHING AWFUL MIGHT HAPPEN: NOT AT ALL
IF YOU CHECKED OFF ANY PROBLEMS ON THIS QUESTIONNAIRE, HOW DIFFICULT HAVE THESE PROBLEMS MADE IT FOR YOU TO DO YOUR WORK, TAKE CARE OF THINGS AT HOME, OR GET ALONG WITH OTHER PEOPLE: NOT DIFFICULT AT ALL

## 2023-03-28 NOTE — RESULT ENCOUNTER NOTE
Lissette AmosAlexa Harvey,  Some of your results came back and are within acceptable limits. -Normal red blood cell (hgb) levels, normal white blood cell count and normal platelet levels.. If you have any further concerns please do not hesitate to contact us by message, phone or making an appointment.  Have a good day   Dr Jorge PALAFOX

## 2023-03-28 NOTE — PROGRESS NOTES
Assessment & Plan     Benign essential hypertension  BP looks good. Continue amlodipine 2.5 mg daily   Continue with a low-salt, low-carb, low caffeine and low alcohol diet.  Stay active.  Avoid anti-inflammatories as much as possible as they can raise blood pressure.  Labs and ziopatch to assess palpitations occurring at night walking up from sleep but not in 2 weeks  May schedule after she returns from trip to [place].  Leaving tomorrow and returning on 8 April.  Stay well-hydrated and can use compression socks while on the long flight.  Will check labs today to make sure there is no anemia or electrolyte or thyroid issue.    Palpitations can also be possibly due to perimenopausal state, thyroid issues,  hormones ( recent mirena and also finishing out Micronor given previously per gyn) & possibly post COVID. Will send note to gyn to see why needs to finish out Micronor given 90 with 3 refills in oct when has mirena now in place.  Recovered from Covid and rebound COVID in jan 2023, S/p paxlovid  Anxiety and low mood currently not an issue.  Counseling currently not needed.  BMI 27 stable to prior, check lipids at physical due 8 May.  Asthma currently stable on albuterol as needed has not required Advair in a while, symptoms improved after doing allergy shots past 5 years.  Plans to stop allergy shots in a couple months  History of serrated and tubular adenoma removed from colon in the past recheck colonoscopy due 2026/ 2027.  Continue Valtrex 2 G twice a day 1 day as needed for cold sore outbreak.  Stay well-hydrated on this med as can cause unpredictable kidney injury at times.  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  Excessive menstruation resolved following hysteroscopy D&C and cervical polyp removal and Mirena IUD in place.  Reports told to remain on norethindrone pill until finishes the prescription.  Keep mammogram appointment in April.  See back  end of May for routine preventive physical, can check lipids & microalbumin at that time  - TSH with free T4 reflex; Future  - Comprehensive metabolic panel (BMP + Alb, Alk Phos, ALT, AST, Total. Bili, TP); Future  - TSH with free T4 reflex  - Comprehensive metabolic panel (BMP + Alb, Alk Phos, ALT, AST, Total. Bili, TP)    Palpitations  Labs and ziopatch to assess palpitations occurring at night walking up from sleep but not in 2 weeks  May schedule after she returns from trip to [place].  Leaving tomorrow and returning on 8 April.  Stay well-hydrated and can use compression socks while on the long flight.  Will check labs today to make sure there is no anemia or electrolyte or thyroid issue.    Palpitations can also be possibly due to perimenopausal state, thyroid issues,  hormones ( recent mirena and also finishing out Micronor given previously per gyn) & possibly post COVID. Will send note to gyn to see why needs to finish out Micronor given 90 with 3 refills in oct when has mirena now in place.  Recovered from Covid and rebound COVID in jan 2023, S/p paxlovid  - Adult Leadless EKG Monitor 8 to 14 Days; Future  - TSH with free T4 reflex; Future  - CBC with platelets and differential; Future  - Comprehensive metabolic panel (BMP + Alb, Alk Phos, ALT, AST, Total. Bili, TP); Future  - TSH with free T4 reflex  - CBC with platelets and differential  - Comprehensive metabolic panel (BMP + Alb, Alk Phos, ALT, AST, Total. Bili, TP)    Adjustment disorder with mixed anxiety and depressed mood  Anxiety and low mood currently not an issue.  Counseling currently not needed.    BMI 27.0-27.9,adult  BMI 27 stable to prior, check lipids at physical due 8 May.    Mild persistent asthma without complication  Asthma currently stable on albuterol as needed has not required Advair in a while, symptoms improved after doing allergy shots past 5 years.  Plans to stop allergy shots in a couple months    Serrated adenoma of  "colon  History of serrated and tubular adenoma removed from colon in the past recheck colonoscopy due 2026/ 2027.    Hx of cold sores  Continue Valtrex 2 G twice a day 1 day as needed for cold sore outbreak.  Stay well-hydrated on this med as can cause unpredictable kidney injury at times.  Currently 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 Current symptoms not suggestive of this.    Excessive or frequent menstruation  IUD (intrauterine device) in place  Excessive menstruation resolved following hysteroscopy D&C and cervical polyp removal and Mirena IUD in place.  Reports told to remain on norethindrone pill until finishes the prescription. Will send note to gyn to see why needs to finish out Micronor given 90 with 3 refills in oct when has mirena now in place    Visit for screening mammogram  Keep mammogram appointment in April.  See back end of May for routine preventive physical, can check lipids & microalbumin at that time    Review of the result(s) of each unique test - diagnostics since 3/2022  Ordering of each unique test  Prescription drug management  38 minutes spent by me on the date of the encounter doing chart review, history and exam, documentation and further activities per the note     BMI:   Estimated body mass index is 27.51 kg/m  as calculated from the following:    Height as of this encounter: 1.727 m (5' 8\").    Weight as of this encounter: 82.1 kg (180 lb 14.4 oz).   Weight management plan: Discussed healthy diet and exercise guidelines    Regular exercise  See Patient Instructions    Beryl Patel MD  [facility]    Ann Hayes is a 47 year old, presenting for the following health issues:  Hypertension  No flowsheet data found.  History of Present Illness       Hypertension: She presents for follow up of hypertension.  She does not check blood pressure  regularly outside of the clinic. Outside blood pressures " have been over 140/90. She follows a low salt diet.     She eats 4 or more servings of fruits and vegetables daily.She consumes 0 sweetened beverage(s) daily.She exercises with enough effort to increase her heart rate 30 to 60 minutes per day.  She exercises with enough effort to increase her heart rate 5 days per week.   She is taking medications regularly.    Today's PHQ-9         PHQ-9 Total Score: 0    PHQ-9 Q9 Thoughts of better off dead/self-harm past 2 weeks :   Not at all    How difficult have these problems made it for you to do your work, take care of things at home, or get along with other people: Not difficult at all  Today's WILLIAMS-7 Score: 0     CURRENTLY  On 3/14 called about having pounding heart waking up from sleep & checked BP when in school with nurse was noted 150/90. Subsequent BP's in 130/S / 80's. Had no change in diet, no alcohol associated with symptoms. No increased anxiety & no symptoms with activity. Recovered from COVID in jan. Had d & C & mirena placed in feb but continued on norethindrone orally. Reports mood better since in new job a while. BMI stable. & asthma has been under good control 7 not used a controller inhaler in a while. Reports fever or chills, no headache or dizziness, no double or blurry vision, no facial pain, earache, sore throat, runny nose, post nasal drip, no trouble hearing, smelling, tasting or swallowing, no cough , no chest pain, trouble breathing. No abdominal pain, heart burn, reflux, nausea or vomiting or diarrhea or constipation, no blood in stools or black stools, no weight loss or night sweats. No dysuria, hematuria, frequency, urgency, hesitancy, incontinence, No pelvic complaints. No leg swelling or joint pain. No rash.  No side effects from amlodipine. Has remained on 2.5 mg dose.Left ankle puffy some time after strained ankle last summer, PT helped.    Palpitations would wake her up from sleep. Able to go back to sleep. Not had any more in 1 week. Rarely  occurred during the day. Desk job as special . No dizziness no syncope, no shortness of breath. Usually drinks 1 to 2 cups of coffee only in am. aA PA friend suggested possibly from being perimenopausal.   Leaving for [place] tomorrow for an anniversary trip with family.    Adjustment anxiety / depression hx, better, new job diff pace. No counseling or med needed.    BMI 27     Serrated adenoma, recheck due 2026    Asthma well controlled on no meds    Allergies has received  shots past 5 yrs and hopes to be done this April     Hx of cold sores has refills of valtrex    Hx of excessive menstruation resolved post hysteroscopy, now has mirena in place but reports was told to still complete out her Micronor prescription. Given 90 with 3 refills in oct.    Mammogram scheduled in April     Hx of Covid  Rx with paxlovid and then had rebound in end of jan, recovered by feb first week.     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, mild persistent asthma on Advair hfa and albuterol prn, HTN on amlodipine 2.5 mg, serrated and tubular adenoma  Remove don scope in 6/2022, excessive menstruation on progesterone only birth control , hx of adjustment disorder with anxiety currently on no meds, patella chondromalacia, hx of resolved left ankle strain, remote lyme's, resolved thumb pain, prior C section, hx of cold sores,   Last seen for a physical in may when doing well, soon after that started on amlodipine for HTN, taken off estrogen due to elevated BP' and put on progesterone only birth control,   Seen in sept for left ankle sprain  seen by gyn 10/7/22 for AUB and advised pelvic u/S u/S in nov showed an endometrial polyp and set up for a hysteroscopy and D & C .noted lost weight and able to come off amlodipine then BP trend up again & seen 12/6/22 and resumed on amlodipine and labs done showed normal kidney function. Seen for preop 1/23/22 & cleared for surgery scheduled for the  "27th. Had a preop op Covid test done 1/25/23    Virtual apt 1/2723 done for being Covid positive. Met criteria for therapy   & given paxlovid for 5 days. Advised to hold the Advair while on paxlovid  Opted not to get a alternative inhaler like beclomethasone due to possible cost  Hold amlodipine and monitor BP off med, if BP > 130/80, to take 1/2 the dose of the 2.5 mg tablet while on the paxlovid. Discussed symptomatic care, when t go to the ER & possibility of Covid relapse on day 6 to 8 after starting paxlovid  Had COVID relapse after Rx with paxlovid & recovered from that no residual symptoms.     Had hysteroscopy, D &C for AUB & cervical polyp.& mirena IUD placed.but noted remained on norethindrone post procedure    Review of Systems   Constitutional, HEENT, cardiovascular, pulmonary, GI, , musculoskeletal, neuro, skin, endocrine and psych systems are negative, except as otherwise noted.      Objective    /80 (BP Location: Right arm, Patient Position: Sitting, Cuff Size: Adult Regular)   Pulse 72   Temp 97  F (36.1  C) (Temporal)   Resp 20   Ht 1.727 m (5' 8\")   Wt 82.1 kg (180 lb 14.4 oz)   LMP 02/10/2023 (Approximate)   SpO2 98%   BMI 27.51 kg/m    Body mass index is 27.51 kg/m .  Physical Exam   GENERAL: healthy, alert and no distress  EYES: Eyes grossly normal to inspection, PERRL and conjunctivae and sclerae normal, glasses  HENT: ear canals and TM's normal, nose and mouth without ulcers or lesions  NECK: no adenopathy, no asymmetry, masses, or scars and thyroid normal to palpation  RESP: lungs clear to auscultation - no rales, rhonchi or wheezes  CV: regular rate and rhythm, normal S1 S2, no S3 or S4, no murmur, click or rub, no peripheral edema and peripheral pulses strong  ABDOMEN: soft, nontender, no hepatosplenomegaly, no masses and bowel sounds normal  MS: no gross musculoskeletal defects noted, no edema  SKIN: no suspicious lesions or rashes  NEURO: Normal strength and tone, " mentation intact and speech normal  PSYCH: mentation appears normal, affect normal/bright    Results for orders placed or performed in visit on 03/28/23   TSH with free T4 reflex     Status: Normal   Result Value Ref Range    TSH 0.92 0.30 - 4.20 uIU/mL   Comprehensive metabolic panel (BMP + Alb, Alk Phos, ALT, AST, Total. Bili, TP)     Status: Normal   Result Value Ref Range    Sodium 140 136 - 145 mmol/L    Potassium 4.1 3.4 - 5.3 mmol/L    Chloride 104 98 - 107 mmol/L    Carbon Dioxide (CO2) 24 22 - 29 mmol/L    Anion Gap 12 7 - 15 mmol/L    Urea Nitrogen 8.3 6.0 - 20.0 mg/dL    Creatinine 0.69 0.51 - 0.95 mg/dL    Calcium 9.1 8.6 - 10.0 mg/dL    Glucose 85 70 - 99 mg/dL    Alkaline Phosphatase 76 35 - 104 U/L    AST 25 10 - 35 U/L    ALT 16 10 - 35 U/L    Protein Total 7.2 6.4 - 8.3 g/dL    Albumin 4.3 3.5 - 5.2 g/dL    Bilirubin Total 0.2 <=1.2 mg/dL    GFR Estimate >90 >60 mL/min/1.73m2   CBC with platelets and differential     Status: Abnormal   Result Value Ref Range    WBC Count 7.1 4.0 - 11.0 10e3/uL    RBC Count 4.81 3.80 - 5.20 10e6/uL    Hemoglobin 12.9 11.7 - 15.7 g/dL    Hematocrit 38.8 35.0 - 47.0 %    MCV 81 78 - 100 fL    MCH 26.8 26.5 - 33.0 pg    MCHC 33.2 31.5 - 36.5 g/dL    RDW 17.3 (H) 10.0 - 15.0 %    Platelet Count 246 150 - 450 10e3/uL    % Neutrophils 62 %    % Lymphocytes 28 %    % Monocytes 7 %    % Eosinophils 2 %    % Basophils 1 %    % Immature Granulocytes 0 %    Absolute Neutrophils 4.4 1.6 - 8.3 10e3/uL    Absolute Lymphocytes 2.0 0.8 - 5.3 10e3/uL    Absolute Monocytes 0.5 0.0 - 1.3 10e3/uL    Absolute Eosinophils 0.2 0.0 - 0.7 10e3/uL    Absolute Basophils 0.0 0.0 - 0.2 10e3/uL    Absolute Immature Granulocytes 0.0 <=0.4 10e3/uL   CBC with platelets and differential     Status: Abnormal    Narrative    The following orders were created for panel order CBC with platelets and differential.  Procedure                               Abnormality         Status                      ---------                               -----------         ------                     CBC with platelets and d...[[id number]]  Abnormal            Final result                 Please view results for these tests on the individual orders.     Results for orders placed or performed in visit on 03/28/23 (from the past 24 hour(s))   TSH with free T4 reflex   Result Value Ref Range    TSH 0.92 0.30 - 4.20 uIU/mL   CBC with platelets and differential    Narrative    The following orders were created for panel order CBC with platelets and differential.  Procedure                               Abnormality         Status                     ---------                               -----------         ------                     CBC with platelets and d...[[id number]]  Abnormal            Final result                 Please view results for these tests on the individual orders.   Comprehensive metabolic panel (BMP + Alb, Alk Phos, ALT, AST, Total. Bili, TP)   Result Value Ref Range    Sodium 140 136 - 145 mmol/L    Potassium 4.1 3.4 - 5.3 mmol/L    Chloride 104 98 - 107 mmol/L    Carbon Dioxide (CO2) 24 22 - 29 mmol/L    Anion Gap 12 7 - 15 mmol/L    Urea Nitrogen 8.3 6.0 - 20.0 mg/dL    Creatinine 0.69 0.51 - 0.95 mg/dL    Calcium 9.1 8.6 - 10.0 mg/dL    Glucose 85 70 - 99 mg/dL    Alkaline Phosphatase 76 35 - 104 U/L    AST 25 10 - 35 U/L    ALT 16 10 - 35 U/L    Protein Total 7.2 6.4 - 8.3 g/dL    Albumin 4.3 3.5 - 5.2 g/dL    Bilirubin Total 0.2 <=1.2 mg/dL    GFR Estimate >90 >60 mL/min/1.73m2   CBC with platelets and differential   Result Value Ref Range    WBC Count 7.1 4.0 - 11.0 10e3/uL    RBC Count 4.81 3.80 - 5.20 10e6/uL    Hemoglobin 12.9 11.7 - 15.7 g/dL    Hematocrit 38.8 35.0 - 47.0 %    MCV 81 78 - 100 fL    MCH 26.8 26.5 - 33.0 pg    MCHC 33.2 31.5 - 36.5 g/dL    RDW 17.3 (H) 10.0 - 15.0 %    Platelet Count 246 150 - 450 10e3/uL    % Neutrophils 62 %    % Lymphocytes 28 %    % Monocytes 7 %    % Eosinophils 2  %    % Basophils 1 %    % Immature Granulocytes 0 %    Absolute Neutrophils 4.4 1.6 - 8.3 10e3/uL    Absolute Lymphocytes 2.0 0.8 - 5.3 10e3/uL    Absolute Monocytes 0.5 0.0 - 1.3 10e3/uL    Absolute Eosinophils 0.2 0.0 - 0.7 10e3/uL    Absolute Basophils 0.0 0.0 - 0.2 10e3/uL    Absolute Immature Granulocytes 0.0 <=0.4 10e3/uL

## 2023-03-28 NOTE — Clinical Note
HI , I saw Abigail for palpitations yesterday and she noted was still on micronor and td to complete script even though now has mirena in place.was given 90 with 3 refills in oct wondering if still needs to finish script?

## 2023-03-28 NOTE — PATIENT INSTRUCTIONS
BP looks good. Continue amlodipine 2.5 mg daily   Continue with a low-salt, low-carb, low caffeine and low alcohol diet.  Stay active.  Avoid anti-inflammatories as much as possible as they can raise blood pressure.  Labs and ziopatch to assess palpitations occurring at night walking up from sleep but not in 2 weeks  May schedule after returns from trip to [place].  Leaving tomorrow and returning on 8 April.  Stay well-hydrated and can use compression socks while on the long flight.  Will check labs today to make sure there is no anemia or electrolyte or thyroid issue.    Palpitations can also be possibly due to perimenopausal state, thyroid issues,  hormones ( recent mirena and also finishing out micronor given previously per gyn) & possibly post COVID  Recovered from covid and rebound COVID in jan 2023, s/p paxlovid  Anxiety and low mood currently not an issue.  Counseling currently not needed.  BMI 27 stable to prior, check lipids at physical due 8 May.  Asthma currently stable on albuterol as needed has not required Advair in a while, symptoms improved after doing allergy shots past 5 years.  Plans to stop allergy shots in a couple months  History of serrated and tubular adenoma removed from colon in the past recheck colonoscopy due 2027.  Continue Valtrex 2 g twice a day 1 day as needed for cold sore outbreak.  Stay well-hydrated on this med as can cause unpredictable kidney injury at times.  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  Excessive menstruation resolved following hysteroscopy D&C and cervical polyp removal and Mirena IUD in place.  Remains on norethindrone pill until finishes the prescription.  Keep mammogram appointment in April.  See you back end of May for routine preventive physical, can check lipids & microalbumin at that time

## 2023-03-29 NOTE — RESULT ENCOUNTER NOTE
Lissette Ms. Harvey,  Your results came back and are within acceptable limits. -Liver and gallbladder tests are normal (ALT,AST, Alk phos, bilirubin), kidney function is normal (Cr, GFR), sodium is normal, potassium is normal, calcium is normal, glucose is normal.  -TSH (thyroid stimulating hormone) level is normal which indicates normal thyroid function.. If you have any further concerns please do not hesitate to contact us by message, phone or making an appointment.  Have a good day   Dr Jorge PALAFOX

## 2023-04-20 ENCOUNTER — PATIENT OUTREACH (OUTPATIENT)
Dept: CARE COORDINATION | Facility: CLINIC | Age: 48
End: 2023-04-20
Payer: COMMERCIAL

## 2023-04-27 ENCOUNTER — ANCILLARY PROCEDURE (OUTPATIENT)
Dept: MAMMOGRAPHY | Facility: CLINIC | Age: 48
End: 2023-04-27
Attending: OBSTETRICS & GYNECOLOGY
Payer: COMMERCIAL

## 2023-04-27 DIAGNOSIS — Z12.31 ENCOUNTER FOR SCREENING MAMMOGRAM FOR BREAST CANCER: ICD-10-CM

## 2023-04-27 PROCEDURE — 77067 SCR MAMMO BI INCL CAD: CPT | Mod: TC | Performed by: RADIOLOGY

## 2023-06-23 DIAGNOSIS — I10 ESSENTIAL HYPERTENSION: ICD-10-CM

## 2023-06-26 RX ORDER — AMLODIPINE BESYLATE 2.5 MG/1
TABLET ORAL
Qty: 90 TABLET | Refills: 2 | Status: SHIPPED | OUTPATIENT
Start: 2023-06-26 | End: 2023-11-30

## 2023-06-26 NOTE — TELEPHONE ENCOUNTER
---Prescription approved per [facility] Refill Protocol.       Rosy Varela RN BSN     [facility]        --Last visit:  3/28/2023

## 2023-07-02 ENCOUNTER — HEALTH MAINTENANCE LETTER (OUTPATIENT)
Age: 48
End: 2023-07-02

## 2023-07-21 ENCOUNTER — THERAPY VISIT (OUTPATIENT)
Dept: PHYSICAL THERAPY | Facility: CLINIC | Age: 48
End: 2023-07-21
Payer: COMMERCIAL

## 2023-07-21 DIAGNOSIS — M76.60 NON-INSERTIONAL ACHILLES TENDINOPATHY: ICD-10-CM

## 2023-07-21 DIAGNOSIS — M25.572 PAIN IN JOINT INVOLVING ANKLE AND FOOT, LEFT: Primary | ICD-10-CM

## 2023-07-21 PROCEDURE — 97161 PT EVAL LOW COMPLEX 20 MIN: CPT | Mod: GP | Performed by: PHYSICAL THERAPIST

## 2023-07-21 PROCEDURE — 97110 THERAPEUTIC EXERCISES: CPT | Mod: GP | Performed by: PHYSICAL THERAPIST

## 2023-07-21 NOTE — PROGRESS NOTES
"PHYSICAL THERAPY EVALUATION  Type of Visit: Evaluation  See electronic medical record for Abuse and Falls Screening details.    Subjective       Presenting condition or subjective complaint: L ankle  Date of onset: 07/04/22    Relevant medical history: Asthma; High blood pressure   Dates & types of surgery: hysterectomy 2/23    Prior diagnostic imaging/testing results:       Prior therapy history for the same diagnosis, illness or injury: Yes last year had PT      Employment: Yes Teacher  Hobbies/Interests: [employer], biking, swimming, reading, camping    Patient goals for therapy: walk better, doing [employer] in August- jogging was very painful and felt like it was locking    Pain assessment: \"hurts a lot\"     Objective   FOOT/ANKLE EVALUATION  PAIN:   Pain Level at Rest: 5/10  Pain Level with Use: 8/10  Pain Location: ankle and foot   Pain Quality: Aching and throbbing at rest, catching with impact activity  Pain Frequency: varies per activity  Pain is Worst: nighttime  Pain is Relieved By: rest, stretch and use  INTEGUMENTARY (edema, incisions): no edema noted  POSTURE: Standing Posture: Rounded shoulders, Forward head, ant pelvic tilt, B genu recuvatum  GAIT:   Weightbearing Status: WBAT  Assistive Device(s): None  Gait Deviations: R hip trendelenberg, dec push off L foot, inc lumbopelvic translation with gait       BALANCE/PROPRIOCEPTION: comparable B, trendelenberg B    ROM: Foot/ankle AROM WNL; comparable knee PROM B- excess genu recurvatum B, dec L hip PROM flexion    STRENGTH: comparable ankle strength B; 3/5 R glute med, 4+/5 L glute med, comparable hip ext strength B  FUNCTIONAL TESTS: Double Leg Squat: R knee valgus, toe out L foot, inc trunk flex  PALPATION: Ttp L achilles tendon midsubstance to distal insertion  JOINT MOBILITY: dec L ankle TCJ post glide, comparable subtalar and first toe mobility B    Assessment & Plan   CLINICAL IMPRESSIONS  Medical Diagnosis: L ankle injury    Treatment Diagnosis: " chronic ankle sprain   Impression/Assessment: Patient is a 47 year old female with L ankle complaints consistent with L achilles tendinopathy secondary to weakness post lateral ligament/syndesmotic ankle sprain > 6 months ago and weakness R LE.  The following significant findings have been identified: Pain, Decreased ROM/flexibility, Decreased joint mobility, Decreased strength, Decreased proprioception and Impaired gait. These impairments interfere with their ability to perform recreational activities, household chores, household mobility and community mobility as compared to previous level of function.     Clinical Decision Making (Complexity):  Clinical Presentation: Stable/Uncomplicated  Clinical Presentation Rationale: based on medical and personal factors listed in PT evaluation  Clinical Decision Making (Complexity): Low complexity    PLAN OF CARE  Treatment Interventions:  Interventions: Gait Training, Manual Therapy, Neuromuscular Re-education, Therapeutic Activity, Therapeutic Exercise, Self-Care/Home Management    Long Term Goals     PT Goal 1  Goal Identifier: ambulation  Goal Description: walk w/o deviation, unrestricted d/t L ankle pain  Rationale:  (allow for safe progression to higher levels of activity)  Target Date: 10/18/23      Frequency of Treatment: wkly to bi-monthly  Duration of Treatment:      Recommended Referrals to Other Professionals: Physical Therapy  Education Assessment:        Risks and benefits of evaluation/treatment have been explained.   Patient/Family/caregiver agrees with Plan of Care.     Evaluation Time:     PT Eval, Low Complexity Minutes (67648): 15    Signing Clinician: Kristin Castillo PT

## 2023-09-30 ENCOUNTER — MYC MEDICAL ADVICE (OUTPATIENT)
Dept: FAMILY MEDICINE | Facility: CLINIC | Age: 48
End: 2023-09-30
Payer: COMMERCIAL

## 2023-10-13 ENCOUNTER — MYC MEDICAL ADVICE (OUTPATIENT)
Dept: FAMILY MEDICINE | Facility: CLINIC | Age: 48
End: 2023-10-13
Payer: COMMERCIAL

## 2023-11-23 ASSESSMENT — ENCOUNTER SYMPTOMS
JOINT SWELLING: 0
HEMATURIA: 0
DIZZINESS: 0
PALPITATIONS: 0
HEADACHES: 0
FREQUENCY: 0
EYE PAIN: 0
CONSTIPATION: 0
DIARRHEA: 0
PARESTHESIAS: 0
NERVOUS/ANXIOUS: 0
HEARTBURN: 0
HEMATOCHEZIA: 0
DYSURIA: 0
FEVER: 0
CHILLS: 0
SORE THROAT: 0
MYALGIAS: 0
BREAST MASS: 0
WEAKNESS: 0
ABDOMINAL PAIN: 0
COUGH: 0
SHORTNESS OF BREATH: 0
ARTHRALGIAS: 0
NAUSEA: 0

## 2023-11-23 ASSESSMENT — ASTHMA QUESTIONNAIRES
QUESTION_3 LAST FOUR WEEKS HOW OFTEN DID YOUR ASTHMA SYMPTOMS (WHEEZING, COUGHING, SHORTNESS OF BREATH, CHEST TIGHTNESS OR PAIN) WAKE YOU UP AT NIGHT OR EARLIER THAN USUAL IN THE MORNING: NOT AT ALL
QUESTION_4 LAST FOUR WEEKS HOW OFTEN HAVE YOU USED YOUR RESCUE INHALER OR NEBULIZER MEDICATION (SUCH AS ALBUTEROL): ONCE A WEEK OR LESS
ACT_TOTALSCORE: 24
QUESTION_5 LAST FOUR WEEKS HOW WOULD YOU RATE YOUR ASTHMA CONTROL: COMPLETELY CONTROLLED
QUESTION_1 LAST FOUR WEEKS HOW MUCH OF THE TIME DID YOUR ASTHMA KEEP YOU FROM GETTING AS MUCH DONE AT WORK, SCHOOL OR AT HOME: NONE OF THE TIME
QUESTION_2 LAST FOUR WEEKS HOW OFTEN HAVE YOU HAD SHORTNESS OF BREATH: NOT AT ALL
ACT_TOTALSCORE: 24

## 2023-11-30 ENCOUNTER — OFFICE VISIT (OUTPATIENT)
Dept: FAMILY MEDICINE | Facility: CLINIC | Age: 48
End: 2023-11-30
Payer: COMMERCIAL

## 2023-11-30 VITALS
WEIGHT: 176.8 LBS | HEIGHT: 69 IN | SYSTOLIC BLOOD PRESSURE: 124 MMHG | HEART RATE: 68 BPM | OXYGEN SATURATION: 97 % | RESPIRATION RATE: 16 BRPM | DIASTOLIC BLOOD PRESSURE: 72 MMHG | BODY MASS INDEX: 26.19 KG/M2 | TEMPERATURE: 97.9 F

## 2023-11-30 DIAGNOSIS — R09.81 CONGESTION OF PARANASAL SINUS: ICD-10-CM

## 2023-11-30 DIAGNOSIS — Z97.5 IUD (INTRAUTERINE DEVICE) IN PLACE: ICD-10-CM

## 2023-11-30 DIAGNOSIS — M76.60 NON-INSERTIONAL ACHILLES TENDINOPATHY: ICD-10-CM

## 2023-11-30 DIAGNOSIS — J45.20 MILD INTERMITTENT ASTHMA WITHOUT COMPLICATION: ICD-10-CM

## 2023-11-30 DIAGNOSIS — Z23 NEED FOR DIPHTHERIA-TETANUS-PERTUSSIS (TDAP) VACCINE: ICD-10-CM

## 2023-11-30 DIAGNOSIS — Z71.89 ADVANCED DIRECTIVES, COUNSELING/DISCUSSION: ICD-10-CM

## 2023-11-30 DIAGNOSIS — D12.6 SERRATED ADENOMA OF COLON: ICD-10-CM

## 2023-11-30 DIAGNOSIS — Z86.19 HX OF COLD SORES: ICD-10-CM

## 2023-11-30 DIAGNOSIS — Z13.1 SCREENING FOR DIABETES MELLITUS: ICD-10-CM

## 2023-11-30 DIAGNOSIS — R00.2 PALPITATIONS: ICD-10-CM

## 2023-11-30 DIAGNOSIS — Z12.11 COLON CANCER SCREENING: ICD-10-CM

## 2023-11-30 DIAGNOSIS — Z00.00 HEALTH CARE MAINTENANCE: ICD-10-CM

## 2023-11-30 DIAGNOSIS — Z00.00 ROUTINE GENERAL MEDICAL EXAMINATION AT A HEALTH CARE FACILITY: Primary | ICD-10-CM

## 2023-11-30 DIAGNOSIS — M22.40 CHONDROMALACIA OF PATELLA, UNSPECIFIED LATERALITY: ICD-10-CM

## 2023-11-30 DIAGNOSIS — Z23 NEED FOR PNEUMOCOCCAL VACCINE: ICD-10-CM

## 2023-11-30 DIAGNOSIS — I10 BENIGN ESSENTIAL HYPERTENSION: ICD-10-CM

## 2023-11-30 PROBLEM — M25.572 PAIN IN JOINT INVOLVING ANKLE AND FOOT, LEFT: Status: RESOLVED | Noted: 2023-07-21 | Resolved: 2023-11-30

## 2023-11-30 LAB
ALBUMIN SERPL BCG-MCNC: 4.4 G/DL (ref 3.5–5.2)
ALP SERPL-CCNC: 63 U/L (ref 40–150)
ALT SERPL W P-5'-P-CCNC: 16 U/L (ref 0–50)
ANION GAP SERPL CALCULATED.3IONS-SCNC: 9 MMOL/L (ref 7–15)
AST SERPL W P-5'-P-CCNC: 20 U/L (ref 0–45)
BASOPHILS # BLD AUTO: 0 10E3/UL (ref 0–0.2)
BASOPHILS NFR BLD AUTO: 0 %
BILIRUB SERPL-MCNC: 0.6 MG/DL
BUN SERPL-MCNC: 9.4 MG/DL (ref 6–20)
CALCIUM SERPL-MCNC: 9.3 MG/DL (ref 8.6–10)
CHLORIDE SERPL-SCNC: 104 MMOL/L (ref 98–107)
CHOLEST SERPL-MCNC: 183 MG/DL
CREAT SERPL-MCNC: 0.7 MG/DL (ref 0.51–0.95)
CREAT UR-MCNC: 65.6 MG/DL
DEPRECATED HCO3 PLAS-SCNC: 26 MMOL/L (ref 22–29)
EGFRCR SERPLBLD CKD-EPI 2021: >90 ML/MIN/1.73M2
EOSINOPHIL # BLD AUTO: 0.1 10E3/UL (ref 0–0.7)
EOSINOPHIL NFR BLD AUTO: 1 %
ERYTHROCYTE [DISTWIDTH] IN BLOOD BY AUTOMATED COUNT: 12.3 % (ref 10–15)
FERRITIN SERPL-MCNC: 78 NG/ML (ref 6–175)
GLUCOSE SERPL-MCNC: 90 MG/DL (ref 70–99)
HBA1C MFR BLD: 5.2 % (ref 0–5.6)
HCT VFR BLD AUTO: 41.5 % (ref 35–47)
HDLC SERPL-MCNC: 56 MG/DL
HGB BLD-MCNC: 14.3 G/DL (ref 11.7–15.7)
IMM GRANULOCYTES # BLD: 0 10E3/UL
IMM GRANULOCYTES NFR BLD: 0 %
LDLC SERPL CALC-MCNC: 103 MG/DL
LYMPHOCYTES # BLD AUTO: 1.8 10E3/UL (ref 0.8–5.3)
LYMPHOCYTES NFR BLD AUTO: 21 %
MCH RBC QN AUTO: 30.8 PG (ref 26.5–33)
MCHC RBC AUTO-ENTMCNC: 34.5 G/DL (ref 31.5–36.5)
MCV RBC AUTO: 89 FL (ref 78–100)
MICROALBUMIN UR-MCNC: <12 MG/L
MICROALBUMIN/CREAT UR: NORMAL MG/G{CREAT}
MONOCYTES # BLD AUTO: 0.5 10E3/UL (ref 0–1.3)
MONOCYTES NFR BLD AUTO: 6 %
NEUTROPHILS # BLD AUTO: 6.1 10E3/UL (ref 1.6–8.3)
NEUTROPHILS NFR BLD AUTO: 73 %
NONHDLC SERPL-MCNC: 127 MG/DL
PLATELET # BLD AUTO: 211 10E3/UL (ref 150–450)
POTASSIUM SERPL-SCNC: 4.2 MMOL/L (ref 3.4–5.3)
PROT SERPL-MCNC: 7.1 G/DL (ref 6.4–8.3)
RBC # BLD AUTO: 4.64 10E6/UL (ref 3.8–5.2)
SODIUM SERPL-SCNC: 139 MMOL/L (ref 135–145)
TRIGL SERPL-MCNC: 121 MG/DL
TSH SERPL DL<=0.005 MIU/L-ACNC: 1.47 UIU/ML (ref 0.3–4.2)
WBC # BLD AUTO: 8.4 10E3/UL (ref 4–11)

## 2023-11-30 PROCEDURE — 82570 ASSAY OF URINE CREATININE: CPT | Performed by: FAMILY MEDICINE

## 2023-11-30 PROCEDURE — 82043 UR ALBUMIN QUANTITATIVE: CPT | Performed by: FAMILY MEDICINE

## 2023-11-30 PROCEDURE — 85025 COMPLETE CBC W/AUTO DIFF WBC: CPT | Performed by: FAMILY MEDICINE

## 2023-11-30 PROCEDURE — 90471 IMMUNIZATION ADMIN: CPT | Performed by: FAMILY MEDICINE

## 2023-11-30 PROCEDURE — 99214 OFFICE O/P EST MOD 30 MIN: CPT | Mod: 25 | Performed by: FAMILY MEDICINE

## 2023-11-30 PROCEDURE — 90472 IMMUNIZATION ADMIN EACH ADD: CPT | Performed by: FAMILY MEDICINE

## 2023-11-30 PROCEDURE — 90677 PCV20 VACCINE IM: CPT | Performed by: FAMILY MEDICINE

## 2023-11-30 PROCEDURE — 99396 PREV VISIT EST AGE 40-64: CPT | Mod: 25 | Performed by: FAMILY MEDICINE

## 2023-11-30 PROCEDURE — 90715 TDAP VACCINE 7 YRS/> IM: CPT | Performed by: FAMILY MEDICINE

## 2023-11-30 PROCEDURE — 83036 HEMOGLOBIN GLYCOSYLATED A1C: CPT | Performed by: FAMILY MEDICINE

## 2023-11-30 PROCEDURE — 82728 ASSAY OF FERRITIN: CPT | Performed by: FAMILY MEDICINE

## 2023-11-30 PROCEDURE — 80061 LIPID PANEL: CPT | Performed by: FAMILY MEDICINE

## 2023-11-30 PROCEDURE — 80053 COMPREHEN METABOLIC PANEL: CPT | Performed by: FAMILY MEDICINE

## 2023-11-30 PROCEDURE — 36415 COLL VENOUS BLD VENIPUNCTURE: CPT | Performed by: FAMILY MEDICINE

## 2023-11-30 PROCEDURE — 84443 ASSAY THYROID STIM HORMONE: CPT | Performed by: FAMILY MEDICINE

## 2023-11-30 RX ORDER — AMLODIPINE BESYLATE 2.5 MG/1
2.5 TABLET ORAL DAILY
Qty: 90 TABLET | Refills: 3 | Status: SHIPPED | OUTPATIENT
Start: 2023-11-30

## 2023-11-30 RX ORDER — FLUTICASONE PROPIONATE AND SALMETEROL XINAFOATE 115; 21 UG/1; UG/1
2 AEROSOL, METERED RESPIRATORY (INHALATION) 2 TIMES DAILY
Qty: 12 G | Refills: 0 | Status: SHIPPED | OUTPATIENT
Start: 2023-11-30

## 2023-11-30 RX ORDER — FLUTICASONE PROPIONATE AND SALMETEROL XINAFOATE 115; 21 UG/1; UG/1
2 AEROSOL, METERED RESPIRATORY (INHALATION) 2 TIMES DAILY
Status: CANCELLED | OUTPATIENT
Start: 2023-11-30

## 2023-11-30 ASSESSMENT — ENCOUNTER SYMPTOMS
DIARRHEA: 0
HEARTBURN: 0
EYE PAIN: 0
WEAKNESS: 0
PARESTHESIAS: 0
HEMATURIA: 0
SORE THROAT: 0
BREAST MASS: 0
ARTHRALGIAS: 0
SHORTNESS OF BREATH: 0
PALPITATIONS: 0
ABDOMINAL PAIN: 0
JOINT SWELLING: 0
NERVOUS/ANXIOUS: 0
MYALGIAS: 0
DIZZINESS: 0
HEMATOCHEZIA: 0
FREQUENCY: 0
CONSTIPATION: 0
DYSURIA: 0
COUGH: 0
FEVER: 0
CHILLS: 0
NAUSEA: 0
HEADACHES: 0

## 2023-11-30 ASSESSMENT — PAIN SCALES - GENERAL: PAINLEVEL: NO PAIN (0)

## 2023-11-30 NOTE — PROGRESS NOTES
SUBJECTIVE:   Abigail is a 47 year old, presenting for the following:  Physical and Hypertension        11/30/2023    10:25 AM   Additional Questions   Roomed by Emelyn BURK RN   Accompanied by Self         11/30/2023    10:25 AM   Patient Reported Additional Medications   Patient reports taking the following new medications None     Healthy Habits:     Getting at least 3 servings of Calcium per day:  Yes    Bi-annual eye exam:  Yes    Dental care twice a year:  Yes    Sleep apnea or symptoms of sleep apnea:  None    Diet:  Low salt    Frequency of exercise:  4-5 days/week    Duration of exercise:  45-60 minutes    Taking medications regularly:  Yes    Medication side effects:  Not applicable    Additional concerns today:  Yes    BACKGROUND  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, mild persistent asthma on Advair hfa and albuterol prn, HTN on amlodipine 2.5 mg, serrated and tubular adenoma  Remove don scope in 6/2022, excessive menstruation on progesterone only birth control, hx of adjustment disorder with anxiety currently on no meds, patella chondromalacia, hx of resolved left ankle strain, remote lyme's, resolved thumb pain, prior C section, hx of cold sores,   Last seen for a physical in may 2022 when doing well, soon after that started on amlodipine for HTN, taken off estrogen due to elevated BP and put on progesterone only birth control, Seen in sept 2022 for left ankle sprain. Seen by gyn 10/7/22 for AUB and advised pelvic u/S u/S in nov showed an endometrial polyp and set up for a hysteroscopy and D & C . Noted lost weight and able to come off amlodipine then BP trended up again & seen 12/6/22 and resumed on amlodipine and labs done showed normal kidney function. Seen for pre op 1/23/22 & cleared for surgery scheduled for the 27 th. Had a pre op op Covid test done 1/25/23  Virtual apt 1/2723 done for being Covid positive. Met criteria for therapy & given Paxlovid for 5 days. Advised to hold the Advair  while on Paxlovid. Opted not to get a alternative inhaler like beclomethasone due to possible cost. Advised to hold amlodipine and monitor BP off med, if BP > 130/80, to take 1/2 the dose of the 2.5 mg tablet while on the Paxlovid. Discussed symptomatic care, when to go to the ER & possibility of Covid relapse on day 6 to 8 after starting Paxlovid. Noted later had COVID relapse after Rx with Paxlovid & recovered from that with no residual symptoms.   Had hysteroscopy, D &C for AUB & cervical polyp.& mirena IUD placed 2/24/23, but noted remained on norethindrone post procedure     Seen 3/28/23 for a check-in.  Blood pressure looked good and continued on amlodipine 2.5 mg daily advised a low-salt, low-carb, low caffeine and low alcohol diet and to stay active.  Avoid NSAID's as much as possible.  Labs and Zio patch done to assess palpitations occurring at night waking up from sleep that may have been perimenopausal.  Noted was going to schedule that after return from trip to [place] was leaving the next day and returning April 8.  Encouraged to stay well-hydrated and use compression socks while on the long flight.  Palpitations thought possibly due to perimenopausal state thyroid hormonal changes recent [place] also on Micronor, possible post-COVID.  Advised to stop the Micronor given already now on the Mirena few weeks.  Noted had recovered from COVID and rebound COVID.  No longer anxious or low mood.  BMI 27 and encouraged to return for physical.  Asthma stable on albuterol as needed noting has not required Advair in a while symptoms improved after doing allergy shots 5 years which she plans to stop in a couple months. History of serrated and tubular adenoma removed from colon in the past, recheck colonoscopy due 2027. Was continued on Valtrex 2 G twice a day 1 day as needed for cold sore outbreak. To stay well-hydrated on this med as could cause unpredictable kidney injury at times. It was noted that [facility] was evaluating Valtrex for Dress syndrome at the time. Noted the excessive menstruation had resolved following hysteroscopy D&C and cervical polyp removal and Mirena IUD in place since 2/24 & advised she no longer needed to be on the norethindrone pill. Was to keep the mammogram apt in April & return in may for routine preventive physical, & labs.  Labs showed normal CBC, TSH and CMP at the time.  In July returned to physical therapy for left ankle pain.    CURRENTLY  Here for a physical  Mirena IUD in place  No breast concerns  Mammo April normal  No fh of Breast, Ovarian or colon Cancer,   PAP due 2025  HM reviewed  No ACP on file & honoring choices given to review  Colon cancer screen due 2027   Vaccines reviewed   Flu and COVID utd  Due for Prevnar 20 & Tdap.  Will do labs today.  Was given vitamin C iron and folic acid at hysteroscopy time earlier this year and continues taking these as needed agreeable to checking ferritin levels to see if necessary.    HTN BP stable on amlodipine 2.5 mg. No side effects.    Palpitations still occasionally feels a fast irregular heart beat for a second, just when sleeping, occurs 2 / week. Spoke to friend who is a NP & said could be due to perimenopause.CBC, CMP and TSH normal in March.  Zio patch had been ordered and was to schedule on return from trip to [place] but did not get done.  Reports no longer with any anxiety or low mood.  Currently no need for meds or counseling.    BMI improved from 27-26, 8 pounds down and shooting to loose 12 more pounds.  7 to 26, 8 lb sdown, shooting for 12 mor pounds    History of cold sores on Valtrex 2 G twice a day 1 day as needed for cold sore outbreak.  He used 1 since last seen in March.    Mild intermittent asthma under care of the allergist completed allergy shots 5 years now will only be seeing the allergist yearly desires Advair to be refilled at PCP office.  Uses 115/21 2 puffs twice a day only as needed and has not  required her albuterol inhaler in a long time either.  ACT score equals =24.     History of serrated and tubular adenoma removed from colon in the past recheck colonoscopy due .  No GI bleeding history    Patellar chondromalacia asymptomatic.  Left Achilles tendinopathy doing fine with exercises given by PT at home     Social History     Tobacco Use     Smoking status: Never     Smokeless tobacco: Never   Substance Use Topics     Alcohol use: Yes     Comment: occasional             2023     9:09 AM   Alcohol Use   Prescreen: >3 drinks/day or >7 drinks/week? No          No data to display              Reviewed orders with patient.  Reviewed health maintenance and updated orders accordingly - Yes  BP Readings from Last 3 Encounters:   23 124/72   23 120/80   23 132/83    Wt Readings from Last 3 Encounters:   23 80.2 kg (176 lb 12.8 oz)   23 82.1 kg (180 lb 14.4 oz)   23 81.8 kg (180 lb 5.4 oz)                  Patient Active Problem List   Diagnosis     Chondromalacia of patella     Mild persistent asthma without complication     Hx of cold sores     Serrated adenoma of colon     Benign essential hypertension     Non-insertional Achilles tendinopathy     Palpitations     Past Surgical History:   Procedure Laterality Date     BIOPSY        SECTION      x2     COLONOSCOPY N/A 2022    Procedure: COLONOSCOPY, WITH POLYPECTOMY;  Surgeon: Marivel Benz MD;  Location: [facility] OR     DILATION AND CURETTAGE, OPERATIVE HYSTEROSCOPY WITH MORCELLATOR, COMBINED N/A 2023    Procedure: HYSTEROSCOPY, WITH DILATION AND CURETTAGE OF UTERUS USING MORCELLATOR;  Surgeon: Nyasia Jacques MD;  Location: [facility]     HC TOOTH EXTRACTION W/FORCEP       INSERT INTRAUTERINE DEVICE N/A 2023    Procedure: INSERTION, INTRAUTERINE DEVICE MIRENA;  Surgeon: Nyasia Jacques MD;  Location: [facility] OR       Social History     Tobacco Use     Smoking status: Never     Smokeless tobacco:  Never   Substance Use Topics     Alcohol use: Yes     Comment: a glass of wine while I am making dinner sometimes     Family History   Problem Relation Age of Onset     Hypertension Mother      Asthma Mother      Heart Disease Mother      Heart Disease Father         heart attack     Cancer Father         lung, brain & spread to lung     Asthma Maternal Grandmother      Cerebrovascular Disease Maternal Grandfather          Current Outpatient Medications   Medication Sig Dispense Refill     ADVAIR -21 MCG/ACT inhaler Inhale 2 puffs into the lungs 2 times daily 12 g 0     albuterol (PROAIR HFA/PROVENTIL HFA/VENTOLIN HFA) 108 (90 Base) MCG/ACT inhaler Inhale 2 puffs into the lungs every 6 hours as needed for shortness of breath / dyspnea or wheezing 1 Inhaler 3     amLODIPine (NORVASC) 2.5 MG tablet Take 1 tablet (2.5 mg) by mouth daily 90 tablet 3     Ascorbic Acid (VITAMIN C) 100 MG CHEW        ferrous sulfate (FEROSUL) 325 (65 Fe) MG tablet Take 325 mg by mouth daily (with breakfast)       folic acid (FOLVITE) 1 MG tablet Take 1 mg by mouth daily       valACYclovir (VALTREX) 1000 mg tablet Take 2 tablets (2,000 mg) by mouth 2 times daily 4 tablet 3     No Known Allergies  Recent Labs   Lab Test 23  1132 23  1506 22  0937 22  1609 18  1512 18  0952   A1C 5.2  --   --  5.0  --   --    *  --   --  125*  --  101*   HDL 56  --   --  52  --  57   TRIG 121  --   --  115  --  152*   ALT 16 16  --  34 37 36   CR 0.70 0.69   < > 0.68 0.63 0.78   GFRESTIMATED >90 >90   < > >90 >90 80   GFRESTBLACK  --   --   --   --  >90 >90   POTASSIUM 4.2 4.1   < > 4.1 3.8 4.3   TSH 1.47 0.92  --  1.31  --   --     < > = values in this interval not displayed.        Breast Cancer Screenin/2/2022     2:56 PM   Breast CA Risk Assessment (FHS-7)   Do you have a family history of breast, colon, or ovarian cancer? No / Unknown     Mammogram Screening: Recommended annual  mammography  Pertinent mammograms are reviewed under the imaging tab.    History of abnormal Pap smear: NO - age 30-65 PAP every 5 years with negative HPV co-testing recommended  Last 3 Pap and HPV Results:       Latest Ref Rng & Units 3/6/2020    10:28 AM 3/6/2020     9:25 AM 2015     4:30 PM   PAP / HPV   PAP (Historical)   NIL     HPV 16 DNA NEG^Negative Negative   Negative    HPV 18 DNA NEG^Negative Negative   Negative    Other HR HPV NEG^Negative Negative   Negative          Latest Ref Rng & Units 3/6/2020    10:28 AM 3/6/2020     9:25 AM 2015     4:30 PM   PAP / HPV   PAP (Historical)   NIL     HPV 16 DNA NEG^Negative Negative   Negative    HPV 18 DNA NEG^Negative Negative   Negative    Other HR HPV NEG^Negative Negative   Negative      Reviewed and updated as needed this visit by clinical staff   Tobacco  Allergies  Meds              Reviewed and updated as needed this visit by Provider                 Past Medical History:   Diagnosis Date     Adjustment disorder with mixed anxiety and depressed mood 11/10/2020     BMI 27.0-27.9,adult 2018     Excessive or frequent menstruation 2015     Hypertension      Injury of left thumb, initial encounter 2017     Irritability 11/10/2020     Lyme disease 2018     Pain in joint involving ankle and foot, left 2022     Sprain of left ankle, unspecified ligament, initial encounter 2022     Thumb pain, left 2017     Uncomplicated asthma       Past Surgical History:   Procedure Laterality Date     BIOPSY        SECTION      x2     COLONOSCOPY N/A 2022    Procedure: COLONOSCOPY, WITH POLYPECTOMY;  Surgeon: Marivel Benz MD;  Location: [facility] OR     DILATION AND CURETTAGE, OPERATIVE HYSTEROSCOPY WITH MORCELLATOR, COMBINED N/A 2023    Procedure: HYSTEROSCOPY, WITH DILATION AND CURETTAGE OF UTERUS USING MORCELLATOR;  Surgeon: Nyasia Jacques MD;  [facility]     HC TOOTH EXTRACTION W/FORCEP        "INSERT INTRAUTERINE DEVICE N/A 2023    Procedure: INSERTION, INTRAUTERINE DEVICE MIRENA;  Surgeon: Nyasia Jacques MD;  [facility]     OB History    Para Term  AB Living   3 2 2 0 1 2   SAB IAB Ectopic Multiple Live Births   1 0 0 0 2      # Outcome Date GA Lbr Jos/2nd Weight Sex Delivery Anes PTL Lv   3 SAB            2 Term      CS-LTranv   MADELIN   1 Term      CS-LTranv   MADELIN       Review of Systems   Constitutional:  Negative for chills and fever.   HENT:  Positive for congestion. Negative for ear pain, hearing loss and sore throat.    Eyes:  Negative for pain and visual disturbance.   Respiratory:  Negative for cough and shortness of breath.    Cardiovascular:  Negative for chest pain, palpitations and peripheral edema.   Gastrointestinal:  Negative for abdominal pain, constipation, diarrhea, heartburn, hematochezia and nausea.   Breasts:  Negative for tenderness, breast mass and discharge.   Genitourinary:  Negative for dysuria, frequency, genital sores, hematuria, pelvic pain, urgency, vaginal bleeding and vaginal discharge.   Musculoskeletal:  Negative for arthralgias, joint swelling and myalgias.   Skin:  Negative for rash.   Neurological:  Negative for dizziness, weakness, headaches and paresthesias.   Psychiatric/Behavioral:  Negative for mood changes. The patient is not nervous/anxious.       OBJECTIVE:   /72 (BP Location: Right arm, Patient Position: Sitting, Cuff Size: Adult Regular)   Pulse 68   Temp 97.9  F (36.6  C) (Temporal)   Resp 16   Ht 1.745 m (5' 8.7\")   Wt 80.2 kg (176 lb 12.8 oz)   LMP  (LMP Unknown)   SpO2 97%   BMI 26.34 kg/m    Physical Exam  GENERAL: healthy, alert and no distress  EYES: Eyes grossly normal to inspection, PERRL and conjunctivae and sclerae normal, wearing glasses  HENT: ear canals and TM's normal, nose and mouth without ulcers or lesions, normal cephalic/atraumatic and face looks flushed reports [place] [facility] [place] [facility] [place] [facility], right lower lip piercing since was a teenager  NECK: no adenopathy, no asymmetry, masses, or scars and thyroid normal to palpation  RESP: lungs clear to auscultation - no rales, rhonchi or wheezes  BREAST: normal without masses, tenderness or nipple discharge and no palpable axillary masses or adenopathy  CV: regular rate and rhythm, normal S1 S2, no S3 or S4, no murmur, click or rub, no peripheral edema and peripheral pulses strong  ABDOMEN: soft, nontender, no hepatosplenomegaly, no masses and bowel sounds normal  MS: no gross musculoskeletal defects noted, no edema  SKIN: no suspicious lesions or rashes  NEURO: Normal strength and tone, mentation intact and speech normal  PSYCH: mentation appears normal, affect normal/bright  LYMPH: no cervical, supraclavicular, axillary, or inguinal adenopathy    Diagnostic Test Results:  Labs reviewed in Epic    ASSESSMENT/PLAN:       ICD-10-CM    1. Routine general medical examination at a health care facility  Z00.00 Pneumococcal 20 Valent Conjugate (Prevnar 20)     TDAP 10-64Y (ADACEL,BOOSTRIX)     PRIMARY CARE FOLLOW-UP SCHEDULING     Hemoglobin A1c     Hemoglobin A1c      2. Benign essential hypertension  I10 amLODIPine (NORVASC) 2.5 MG tablet     Comprehensive metabolic panel     TSH with free T4 reflex     Lipid panel reflex to direct LDL Fasting     Albumin Random Urine Quantitative with Creat Ratio     Comprehensive metabolic panel     TSH with free T4 reflex     Lipid panel reflex to direct LDL Fasting     Albumin Random Urine Quantitative with Creat Ratio      3. Palpitations  R00.2 CBC with Platelets & Differential     Ferritin     CBC with Platelets & Differential     Ferritin      4. BMI 26.0-26.9,adult  Z68.26       5. Hx of cold sores  Z86.19       6. Mild intermittent asthma without complication  J45.20 Pneumococcal 20 Valent Conjugate (Prevnar 20)     ADVAIR -21 MCG/ACT inhaler      7. Congestion of paranasal sinus  R09.81        8. Serrated adenoma of colon  D12.6       9. Chondromalacia of patella, unspecified laterality  M22.40       10. Non-insertional Achilles tendinopathy  M76.60       11. IUD (intrauterine device) in place  Z97.5       12. Health care maintenance  Z00.00 REVIEW OF HEALTH MAINTENANCE PROTOCOL ORDERS      13. Advanced directives, counseling/discussion  Z71.89       14. Colon cancer screening  Z12.11       15. Need for diphtheria-tetanus-pertussis (Tdap) vaccine  Z23 TDAP 10-64Y (ADACEL,BOOSTRIX)      16. Need for pneumococcal vaccine  Z23 Pneumococcal 20 Valent Conjugate (Prevnar 20)      17. Screening for diabetes mellitus  Z13.1 Hemoglobin A1c     Hemoglobin A1c        Seen today for preventive physical and follow-up of hypertension asthma etc.  Currently no menstrual cycles on Mirena IUD in place since February 2023,  Recommend checking for strings on a regular basis.  Pelvic Pap HPV not due till 2025  Breast exam normal recommend self check regularly.  May consider referral to  if there is any family history of breast ovarian or colon cancer.  Mammogram done 4/2023 was normal continue with annual screening mammograms.  Colon cancer screening due again in 2027 given prior precancerous polyp removed in 2022,  Healthcare maintenance reviewed.  Consider working on healthcare directives and honoring choices given to review.  Vaccines reviewed.  Up-to-date with flu and COVID-vaccine.  Prevnar 20 due and given today.  Tdap due and given today.  May have a sore swollen arm & low-grade fever and body aches couple days.  Lab work today and will make further recommendations once those are reviewed.    Hypertension stable on amlodipine 2.5 mg daily.  Weight loss has helped.  Continue with a low-salt, low carbohydrate, low caffeine low alcohol diet.  Continue to stay active.  Avoid anti-inflammatories is much as possible as they can raise blood pressure.    Continues to feel occasional fast heartbeat lasting a second  with no associated symptoms mostly when sleeping about twice a week.  Sounds like palpitations.  May be associated with being perimenopausal.  CBC, CMP and TSH normal in March.  Zio patch had been ordered and was to schedule on return from trip to [place] but did not get done.  Reports no longer with any anxiety or low mood.  Currently no need for meds or counseling. Will check labs today.  Stay well-hydrated. May call the cardiology department to schedule ziopatch to assess heart rhythm.    BMI improved to 26.  Continue with healthy lifestyle and diet.    History of cold sores uses Valtrex 2 g twice a day for 1 day as needed for outbreak.  4 pills given in March with 3 refills.  Has only had to use once since then.  Stay well-hydrated on this medication as it can cause unpredictable kidney injury at times and monitor for allergy symptoms.    Current nasal congestion is not new.  History of allergies completed allergy shots.  And much better now.  History now of mild intermittent asthma completed 5 years of allergy shots and off them.  Only uses Advair 115/21 2 puffs twice a day as needed rather than on a daily basis.  Has albuterol as well as rescue but counseled current guidelines state to use the controller med as well for rescue for better relief of symptoms.  When should have a flareup recommend using the Advair twice a day for 2 weeks.  Previously only prescribed by the allergist now seeing them once a year.  1 refill sent in as it seems to last a while.  Follow-up with allergist yearly for breathing test and they can make further med changes if needed in the future.    History of serrated and tubular adenomatous polyps removed from colon in 2022, due again for colonoscopy in 2027    Chondromalacia of patella doing fine with exercises by PT at home.    History of noninsertional Achilles tendinopathy improved with exercises given by physical therapy.    IUD in place excessive menstruation resolved s/p  "hysteroscopy D&C and cervical polyp removal early this year.  No longer on oral contraceptive med.  Will check labs to see if still needs to be on vitamin C iron and folic acid using as needed since the procedure in Jan.    Return in 1 year for preventive physical and sooner in an office visit for any new concerns    After the visit some labs came back showing   -Normal red blood cell (Hgb) levels, normal white blood cell count and normal platelet levels.  -A1C (diabetic test) is normal  -Cholesterol levels (LDL,HDL, Triglycerides) are normal.  ADVISE: rechecking in 1 year.   -Liver and gallbladder tests are normal (ALT,AST, Alk phos, bilirubin), kidney function is normal (Cr, GFR), sodium is normal, potassium is normal, calcium is normal, glucose is normal.  -TSH (thyroid stimulating hormone) level is normal which indicates normal thyroid function.  -Ferritin (iron) level is normal. No need for any iron or folate or vit c supplementation  -Microalbumin (urine protein) test is normal.  ADVISE: rechecking this annually.    Patient has been advised of split billing requirements and indicates understanding: Yes    COUNSELING:  Reviewed preventive health counseling, as reflected in patient instructions    BMI:   Estimated body mass index is 26.34 kg/m  as calculated from the following:    Height as of this encounter: 1.745 m (5' 8.7\").    Weight as of this encounter: 80.2 kg (176 lb 12.8 oz).   Weight management plan: Discussed healthy diet and exercise guidelines    She reports that she has never smoked. She has never used smokeless tobacco.        Beryl Patel MD  [facility]    The above note was dictated using voice recognition. Although reviewed after completion, some word and grammatical error may remain .    "

## 2023-11-30 NOTE — PATIENT INSTRUCTIONS
Seen today for preventive physical and follow-up of hypertension asthma etc.  Currently no menstrual cycles on Mirena IUD in place since February 2023,  Recommend checking for strings on a regular basis.  Pelvic Pap HPV not due till 2025  Breast exam normal recommend self check regularly.  May consider referral to  if there is any family history of breast ovarian or colon cancer.  Mammogram done 4/2023 was normal continue with annual screening mammograms.  Colon cancer screening due again in 2027 given prior precancerous polyp removed in 2022,  Healthcare maintenance reviewed.  Consider working on healthcare directives and honoring choices given to review.  Vaccines reviewed.  Up-to-date with flu and COVID-vaccine.  Prevnar 20 due and given today.  Tdap due and given today.  May have a sore swollen merritt low-grade fever and body aches couple days.  Lab work today and will make further recommendations once those are reviewed.    Hypertension stable on amlodipine 2.5 mg daily.  Weight loss has helped.  Continue with a low-salt, low carbohydrate, low caffeine low alcohol diet.  Continue to stay active.  Avoid anti-inflammatories is much as possible as they can raise blood pressure.    Continues to feel occasional fast heartbeat lasting a second with no associated symptoms mostly when sleeping about twice a week.  Sounds like palpitations.  May be associated with being perimenopausal.  Will check labs today.  Stay well-hydrated.  Zio patch ordered in March did not get done may call the cardiology department to sign up for this to assess heart rhythm.    BMI improved to 26.  Continue with healthy lifestyle and diet.    History of cold sores uses Valtrex 2 g twice a day for 1 day as needed for outbreak.  4 pills given in March with 3 refills.  Has only had to use x 1 since then.  Stay well-hydrated on this medication as it can cause unpredictable kidney injury at times and monitor for allergy  symptoms.    Current nasal congestion is not new.  History of allergies completed allergy shots.  History now of mild intermittent asthma completed 5 years of allergy shots and off them.  Only uses Advair 115/21 2 puffs twice a day as needed rather than on a daily basis.  Has albuterol as well as rescue but counseled current guidelines state to use the controller med as well for rescue for better relief of symptoms.  When should have a flareup recommend using it twice a day for 2 weeks.  Previously only prescribed by the allergist now seeing them once a year.  1 refill sent in as it seems to last a while.  Follow-up with allergist yearly for breathing test and they can make further med changes if needed in the future.    History of serrated and tubular adenomatous polyps removed from colon in 2022, due again for colonoscopy in 2027    Chondromalacia of patella doing fine with exercises by PT at home.    History of noninsertional Achilles tendinopathy improved with exercises given by physical therapy.    IUD in place excessive menstruation resolved s/p hysteroscopy D&C and cervical polyp removal early this year.  No longer on oral contraceptive med.  Will check labs to see if still needs to be on vitamin C iron and folic acid using as needed since the procedure in Jan.    Return in 1 year for preventive physical and sooner in an office visit for any new concerns    The above note was dictated using voice recognition. Although reviewed after completion, some word and grammatical error may remain .             Preventive Health Recommendations  Female Ages 40 to 49    Yearly exam:   See your health care provider every year in order to  Review health changes.   Discuss preventive care.    Review your medicines if your doctor prescribed any.    Get a Pap test every three years (unless you have an abnormal result and your provider advises testing more often).    If you get Pap tests with HPV test, you only need to test  every 5 years, unless you have an abnormal result. You do not need a Pap test if your uterus was removed (hysterectomy) and you have not had cancer.    You should be tested each year for STDs (sexually transmitted diseases), if you're at risk.   Ask your doctor if you should have a mammogram.    Have a colonoscopy (test for colon cancer) beginning at age 45.  Ask your provider about other options like a yearly FIT test or Cologuard test every 3 years (stool tests)      Have a cholesterol test every 5 years.     Have a diabetes test (fasting glucose) after age 45. If you are at risk for diabetes, you should have this test every 3 years.    Shots: Get a flu shot each year. Get a tetanus shot every 10 years.     Nutrition:   Eat at least 5 servings of fruits and vegetables each day.  Eat whole-grain bread, whole-wheat pasta and brown rice instead of white grains and rice.  Get adequate Calcium and Vitamin D.      Lifestyle  Exercise at least 150 minutes a week (an average of 30 minutes a day, 5 days a week). This will help you control your weight and prevent disease.  Limit alcohol to one drink per day.  No smoking.   Wear sunscreen to prevent skin cancer.  See your dentist every six months for an exam and cleaning.

## 2023-11-30 NOTE — RESULT ENCOUNTER NOTE
Hello -    Here are my comments about your recent results:  -Normal red blood cell (hgb) levels, normal white blood cell count and normal platelet levels.  -A1C (diabetic test) is normal and indicates that your blood sugar has been in a normal range the last 3 months.  For additional lab test information, labtestsonline.org is an excellent reference..    Please let us know if you have any questions or concerns.     Regards,  Beryl Patel MD

## 2023-11-30 NOTE — LETTER
My Asthma Action Plan    Name: Abigail Harvey   YOB: 1975  Date: 11/30/2023   My doctor: Beryl Patel MD   My clinic: [facility]        My Rescue Medicine:   Advair /21 2 puffs twice a day during a flareup for 2 weeks and additional 1 puff as needed for rescue.    Albuterol inhaler (Proair/Ventolin/Proventil HFA)  2-4 puffs EVERY 6 HOURS as needed. Use a spacer if recommended by your provider.   My Asthma Severity:   Intermittent / Exercise Induced  Know your asthma triggers: upper respiratory infections and exercise or sports             GREEN ZONE   Good Control  I feel good  No cough or wheeze  Can work, sleep and play without asthma symptoms       Take your asthma control medicine every day.     If exercise triggers your asthma, take your rescue medication  15 minutes before exercise or sports, and  During exercise if you have asthma symptoms  Spacer to use with inhaler: If you have a spacer, make sure to use it with your inhaler             YELLOW ZONE Getting Worse  I have ANY of these:  I do not feel good  Cough or wheeze  Chest feels tight  Wake up at night   Keep taking your Green Zone medications  Start taking your rescue medicine:  every 20 minutes for up to 1 hour. Then every 4 hours for 24-48 hours.  If you stay in the Yellow Zone for more than 12-24 hours, contact your doctor.  If you do not return to the Green Zone in 12-24 hours or you get worse, start taking your oral steroid medicine if prescribed by your provider.           RED ZONE Medical Alert - Get Help  I have ANY of these:  I feel awful  Medicine is not helping  Breathing getting harder  Trouble walking or talking  Nose opens wide to breathe       Take your rescue medicine NOW  If your provider has prescribed an oral steroid medicine, start taking it NOW  Call your doctor NOW  If you are still in the Red Zone after 20 minutes and you have not reached your doctor:  Take your rescue medicine  again and  Call 911 or go to the emergency room right away    See your regular doctor within 2 weeks of an Emergency Room or Urgent Care visit for follow-up treatment.          Annual Reminders:  Meet with Asthma Educator,  Flu Shot in the Fall, consider Pneumonia Vaccination for patients with asthma (aged 19 and older).    Pharmacy:    [id number] [place], [place] - [address] [id number] - [place], [place] - [address] [facility]  [id number] [place], [place] - [address] [facility] [place], [place] - [address]    Electronically signed by Beryl Patel MD   Date: 11/30/23                    Asthma Triggers  How To Control Things That Make Your Asthma Worse    Triggers are things that make your asthma worse.  Look at the list below to help you find your triggers and   what you can do about them. You can help prevent asthma flare-ups by staying away from your triggers.      Trigger                                                          What you can do   Cigarette Smoke  Tobacco smoke can make asthma worse. Do not allow smoking in your home, car or around you.  Be sure no one smokes at a child s day care or school.  If you smoke, ask your health care provider for ways to help you quit.  Ask family members to quit too.  Ask your health care provider for a referral to Quit Plan to help you quit smoking, or call 3-078-597-PLAN.     Colds, Flu, Bronchitis  These are common triggers of asthma. Wash your hands often.  Don t touch your eyes, nose or mouth.  Get a flu shot every year.     Dust Mites  These are tiny bugs that live in cloth or carpet. They are too small to see. Wash sheets and blankets in hot water every week.   Encase pillows and mattress in dust mite proof covers.  Avoid having carpet if you can. If you have carpet, vacuum weekly.   Use a dust mask and HEPA vacuum.   Pollen and Outdoor Mold  Some  people are allergic to trees, grass, or weed pollen, or molds. Try to keep your windows closed.  Limit time out doors when pollen count is high.   Ask you health care provider about taking medicine during allergy season.     Animal Dander  Some people are allergic to skin flakes, urine or saliva from pets with fur or feathers. Keep pets with fur or feathers out of your home.    If you can t keep the pet outdoors, then keep the pet out of your bedroom.  Keep the bedroom door closed.  Keep pets off cloth furniture and away from stuffed toys.     Mice, Rats, and Cockroaches  Some people are allergic to the waste from these pests.   Cover food and garbage.  Clean up spills and food crumbs.  Store grease in the refrigerator.   Keep food out of the bedroom.   Indoor Mold  This can be a trigger if your home has high moisture. Fix leaking faucets, pipes, or other sources of water.   Clean moldy surfaces.  Dehumidify basement if it is damp and smelly.   Smoke, Strong Odors, and Sprays  These can reduce air quality. Stay away from strong odors and sprays, such as perfume, powder, hair spray, paints, smoke incense, paint, cleaning products, candles and new carpet.   Exercise or Sports  Some people with asthma have this trigger. Be active!  Ask your doctor about taking medicine before sports or exercise to prevent symptoms.    Warm up for 5-10 minutes before and after sports or exercise.     Other Triggers of Asthma  Cold air:  Cover your nose and mouth with a scarf.  Sometimes laughing or crying can be a trigger.  Some medicines and food can trigger asthma.

## 2023-12-01 ENCOUNTER — PATIENT OUTREACH (OUTPATIENT)
Dept: GASTROENTEROLOGY | Facility: CLINIC | Age: 48
End: 2023-12-01
Payer: COMMERCIAL

## 2023-12-01 NOTE — RESULT ENCOUNTER NOTE
Hello -    Here are my comments about your recent results:  -Cholesterol levels (LDL,HDL, Triglycerides) are normal.  ADVISE: rechecking in 1 year.   -Liver and gallbladder tests are normal (ALT,AST, Alk phos, bilirubin), kidney function is normal (Cr, GFR), sodium is normal, potassium is normal, calcium is normal, glucose is normal.  -TSH (thyroid stimulating hormone) level is normal which indicates normal thyroid function.  -Ferritin (iron) level is normal.no need for any iron r folate or vit c supplimentation  -Microalbumin (urine protein) test is normal.  ADVISE: rechecking this annually.  For additional lab test information, labtestsonline.org is an excellent reference..    Please let us know if you have any questions or concerns.     Regards,  Beryl Patel MD

## 2023-12-04 ENCOUNTER — MYC MEDICAL ADVICE (OUTPATIENT)
Dept: FAMILY MEDICINE | Facility: CLINIC | Age: 48
End: 2023-12-04
Payer: COMMERCIAL

## 2024-10-09 NOTE — PROGRESS NOTES
Xray radiology overread consistent with my read in clinic. Result already discussed with patient. Please see clinic note.  No further follow up needed.  Rocio Treviño 2/24/2017
no

## 2024-11-13 NOTE — TELEPHONE ENCOUNTER
Patient informed as below.  Coming in on Monday for labs.  Did you want to recheck Ferritin at that time?  Patient states she is feeling much better than when seen.  Iveth Batista RN   Detail Level: Generalized General Sunscreen Counseling: I recommended a broad spectrum sunscreen with a SPF of 30 or higher.  I explained that SPF 30 sunscreens block approximately 97 percent of the sun's harmful rays.  Sunscreens should be applied at least 15 minutes prior to expected sun exposure and then every 2 hours after that as long as sun exposure continues. If swimming or exercising sunscreen should be reapplied every 45 minutes to an hour after getting wet or sweating.  One ounce, or the equivalent of a shot glass full of sunscreen, is adequate to protect the skin not covered by a bathing suit. I also recommended a lip balm with a sunscreen as well. Sun protective clothing can be used in lieu of sunscreen but must be worn the entire time you are exposed to the sun's rays.

## 2025-02-01 ENCOUNTER — HEALTH MAINTENANCE LETTER (OUTPATIENT)
Age: 50
End: 2025-02-01

## 2025-07-12 ENCOUNTER — HEALTH MAINTENANCE LETTER (OUTPATIENT)
Age: 50
End: 2025-07-12

## (undated) DEVICE — NDL SCLEROTHERAPY 25GA CARR-LOCK  00711811

## (undated) DEVICE — LINEN TOWEL PACK X5 5464

## (undated) DEVICE — GOWN IMPERVIOUS 2XL BLUE

## (undated) DEVICE — GLOVE BIOGEL PI MICRO SZ 6.5 48565

## (undated) DEVICE — SUCTION MANIFOLD NEPTUNE 2 SYS 1 PORT 702-025-000

## (undated) DEVICE — TUBING SUCTION 12"X1/4" N612

## (undated) DEVICE — GLOVE BIOGEL PI MICRO INDICATOR UNDERGLOVE SZ 7.0 48970

## (undated) DEVICE — ENDO SNARE EXACTO COLD 9MM LOOP 2.4MMX230CM 00711115

## (undated) DEVICE — SPECIMEN CONTAINER 3OZ W/FORMALIN 59901

## (undated) DEVICE — SNARE CAPIVATOR ROUND COLD SNR BX10 M00561101

## (undated) DEVICE — SOL WATER IRRIG 1000ML BOTTLE 2F7114

## (undated) DEVICE — KIT PROCEDURE FLUENT IN/OUT FLOWPAK TISS TRAP FLT-112S

## (undated) DEVICE — SOL NACL 0.9% IRRIG 3000ML BAG 2B7477

## (undated) DEVICE — SOL WATER IRRIG 500ML BOTTLE 2F7113

## (undated) DEVICE — ENDO FORCEP SPIKED SERRATED SHAFT JUMBO 239CM G56998

## (undated) DEVICE — Device

## (undated) DEVICE — LINEN GOWN X4 5410

## (undated) DEVICE — SEAL SET MYOSURE ROD LENS SCOPE SINGLE USE 40-902

## (undated) DEVICE — PAD CHUX UNDERPAD 30X36" P3036C

## (undated) DEVICE — KIT ENDO TURNOVER/PROCEDURE CARRY-ON 101822

## (undated) DEVICE — PREP DYNA-HEX 4% CHG SCRUB 4OZ BOTTLE MDS098710

## (undated) DEVICE — ENDO TRAP POLYP E-TRAP 00711099

## (undated) DEVICE — STRAP KNEE/BODY 31143004

## (undated) RX ORDER — ACETAMINOPHEN 325 MG/1
TABLET ORAL
Status: DISPENSED
Start: 2023-02-24

## (undated) RX ORDER — ALBUTEROL SULFATE 90 UG/1
AEROSOL, METERED RESPIRATORY (INHALATION)
Status: DISPENSED
Start: 2023-02-24

## (undated) RX ORDER — ONDANSETRON 2 MG/ML
INJECTION INTRAMUSCULAR; INTRAVENOUS
Status: DISPENSED
Start: 2023-02-24

## (undated) RX ORDER — FENTANYL CITRATE-0.9 % NACL/PF 10 MCG/ML
PLASTIC BAG, INJECTION (ML) INTRAVENOUS
Status: DISPENSED
Start: 2023-02-24

## (undated) RX ORDER — LIDOCAINE HYDROCHLORIDE 10 MG/ML
INJECTION, SOLUTION EPIDURAL; INFILTRATION; INTRACAUDAL; PERINEURAL
Status: DISPENSED
Start: 2023-02-24

## (undated) RX ORDER — PROPOFOL 10 MG/ML
INJECTION, EMULSION INTRAVENOUS
Status: DISPENSED
Start: 2023-02-24

## (undated) RX ORDER — GLYCOPYRROLATE 0.2 MG/ML
INJECTION INTRAMUSCULAR; INTRAVENOUS
Status: DISPENSED
Start: 2023-02-24

## (undated) RX ORDER — FENTANYL CITRATE 50 UG/ML
INJECTION, SOLUTION INTRAMUSCULAR; INTRAVENOUS
Status: DISPENSED
Start: 2023-02-24